# Patient Record
Sex: FEMALE | Race: WHITE | NOT HISPANIC OR LATINO | Employment: FULL TIME | URBAN - METROPOLITAN AREA
[De-identification: names, ages, dates, MRNs, and addresses within clinical notes are randomized per-mention and may not be internally consistent; named-entity substitution may affect disease eponyms.]

---

## 2017-03-18 DIAGNOSIS — E78.01 FAMILIAL HYPERCHOLESTEROLEMIA: ICD-10-CM

## 2017-03-20 RX ORDER — ROSUVASTATIN CALCIUM 40 MG/1
TABLET, COATED ORAL
Qty: 30 TABLET | Refills: 2 | Status: SHIPPED | OUTPATIENT
Start: 2017-03-20 | End: 2017-11-22 | Stop reason: SDUPTHER

## 2017-05-03 ENCOUNTER — TELEPHONE (OUTPATIENT)
Dept: URGENT CARE | Facility: CLINIC | Age: 47
End: 2017-05-03

## 2017-06-30 RX ORDER — LISINOPRIL AND HYDROCHLOROTHIAZIDE 12.5; 1 MG/1; MG/1
1 TABLET ORAL DAILY
Qty: 5 TABLET | Refills: 0 | Status: SHIPPED | OUTPATIENT
Start: 2017-06-30 | End: 2017-07-20 | Stop reason: SDUPTHER

## 2017-07-20 RX ORDER — LISINOPRIL AND HYDROCHLOROTHIAZIDE 12.5; 1 MG/1; MG/1
1 TABLET ORAL DAILY
Qty: 30 TABLET | Refills: 5 | Status: SHIPPED | OUTPATIENT
Start: 2017-07-20 | End: 2017-07-21 | Stop reason: SDUPTHER

## 2017-07-21 ENCOUNTER — TELEPHONE (OUTPATIENT)
Dept: FAMILY MEDICINE CLINIC | Facility: CLINIC | Age: 47
End: 2017-07-21

## 2017-07-21 RX ORDER — LISINOPRIL AND HYDROCHLOROTHIAZIDE 12.5; 1 MG/1; MG/1
1 TABLET ORAL DAILY
Qty: 30 TABLET | Refills: 5 | Status: SHIPPED | OUTPATIENT
Start: 2017-07-21 | End: 2017-11-01 | Stop reason: SDUPTHER

## 2017-07-21 NOTE — TELEPHONE ENCOUNTER
Spoke with patient.  Resent prescription to [facility], she no longer goes to [facility].

## 2017-07-21 NOTE — TELEPHONE ENCOUNTER
----- Message from Elias Zavaleta MD sent at 7/20/2017  5:19 PM EDT -----  Contact: PATIENT  ruben was the defualt. Let her know that is where it is.     ----- Message -----     From: Gaviota RANDA Godwin     Sent: 7/20/2017  12:28 PM       To: Elias Zavaleta MD    PATIENT SCHEDULED A TRANSFER APPOINTMENT WITH YOU ON 8/7/2017. PATIENT IS REQUESTING A REFILL ON HER LISINOPRIL UNTIL SHE CAN MAKE IT IN FOR HER APPOINTMENT. PATIENT USES [facility].

## 2017-07-27 ENCOUNTER — OFFICE VISIT (OUTPATIENT)
Dept: CARDIOLOGY | Facility: CLINIC | Age: 47
End: 2017-07-27

## 2017-07-27 VITALS
DIASTOLIC BLOOD PRESSURE: 72 MMHG | HEIGHT: 61 IN | SYSTOLIC BLOOD PRESSURE: 116 MMHG | BODY MASS INDEX: 30.17 KG/M2 | WEIGHT: 159.8 LBS | HEART RATE: 77 BPM

## 2017-07-27 DIAGNOSIS — R00.2 PALPITATIONS: Primary | ICD-10-CM

## 2017-07-27 DIAGNOSIS — E78.2 MIXED HYPERLIPIDEMIA: ICD-10-CM

## 2017-07-27 PROCEDURE — 99214 OFFICE O/P EST MOD 30 MIN: CPT | Performed by: INTERNAL MEDICINE

## 2017-07-27 NOTE — PROGRESS NOTES
[facility] at [facility]  Consultation H&P  Edyta Jocye  1970  [phone]    VISIT DATE:  2016    PCP: Bill Albrecht DO  [address] [facility] [address] [address]    IDENTIFICATION: A 47 y.o. female  from [place]. [facility].    CC:  Chief Complaint   Patient presents with   • Hyperlipidemia   • Hypertension       PROBLEM LIST:  1. Chest pain/palpitations   A. Event monitor : Breeding - predominant NSR, PAC's.   B. 2017 stress echo: mild hypertensive BP response, overall low risk stress echo.  2. Hypertension  3. Hyperlipidemia   A. 10/16: , , HDL 48,  - on statin  4. Fibromyalgia  5. Venous insufficiency  6. Epilepsy    Past Surgical History:   Procedure Laterality Date   • ANKLE SURGERY     •  SECTION     • HYSTERECTOMY     • LUMBAR LAMINECTOMY  2011    HEMILAMI RT-C7-T1, RT C7-T1 MEDIAL FACET & CT-T1 DISC (Dr. Cabrera      Allergies  Allergies   Allergen Reactions   • Diphenhydramine    • Penicillins        Current Medications    Current Outpatient Prescriptions:   •  aspirin (DIEGO ASPIRIN EC LOW DOSE) 81 MG EC tablet, Take  by mouth daily., Disp: , Rfl:   •  estrogens, conjugated, (PREMARIN) 0.625 MG tablet, Take 0.625 mg by mouth Daily. Take daily for 21 days then do not take for 7 days., Disp: , Rfl:   •  ezetimibe (ZETIA) 10 MG tablet, Take 1 tablet by mouth Daily., Disp: 30 tablet, Rfl: 5  •  lisinopril-hydrochlorothiazide (PRINZIDE,ZESTORETIC) 10-12.5 MG per tablet, Take 1 tablet by mouth Daily., Disp: 30 tablet, Rfl: 5  •  rosuvastatin (CRESTOR) 40 MG tablet, TAKE ONE TABLET BY MOUTH DAILY, Disp: 30 tablet, Rfl: 2     History of Present Illness   Hyperlipidemia   Associated symptoms include chest pain.   Hypertension   Associated symptoms include chest pain and malaise/fatigue.     Patient is a 46-year-old female with a history of hypercholesterolemia and hypertension who presents in fu for palpitations.  Pt  "w monthly occasional skipped beat that \"takes her breath\".  She travelled to [place] this summer and walked extensively without issue.    ROS  Review of Systems   Constitution: Positive for malaise/fatigue.   Cardiovascular: Positive for chest pain, dyspnea on exertion and irregular heartbeat.   All other systems reviewed and are negative.      SOCIAL HX  Social History     Social History   • Marital status: Single     Spouse name: N/A   • Number of children: N/A   • Years of education: N/A     Occupational History   • Not on file.     Social History Main Topics   • Smoking status: Former Smoker     Quit date: 7/27/2015   • Smokeless tobacco: Not on file      Comment: social smoker very rare   • Alcohol use No   • Drug use: No   • Sexual activity: Defer     Other Topics Concern   • Not on file     Social History Narrative       FAMILY HX  Family History   Problem Relation Age of Onset   • Diabetes Mother    • Congenital heart disease Mother    • Hypertension Mother    • Stroke Mother    • Diabetes Father    • Congenital heart disease Father    • Hypertension Father    • Heart attack Other    • Hyperlipidemia Other        Vitals:    07/27/17 1531   BP: 116/72   BP Location: Right arm   Patient Position: Sitting   Pulse: 77   Weight: 159 lb 12.8 oz (72.5 kg)   Height: 61\" (154.9 cm)       PHYSICAL EXAMINATION:  Physical Exam   Constitutional: She is oriented to person, place, and time. She appears well-developed and well-nourished. No distress.   HENT:   Head: Normocephalic and atraumatic.   Nose: Nose normal.   Mouth/Throat: Uvula is midline, oropharynx is clear and moist and mucous membranes are normal.   Eyes: Conjunctivae and EOM are normal. Pupils are equal, round, and reactive to light. No scleral icterus.   Neck: Normal range of motion. Neck supple. No hepatojugular reflux and no JVD present. Carotid bruit is not present. No tracheal deviation present. No thyromegaly present.   Cardiovascular: Normal rate, " regular rhythm, S1 normal, S2 normal, intact distal pulses and normal pulses.  PMI is not displaced.  Exam reveals no gallop, no distant heart sounds, no friction rub, no midsystolic click and no opening snap.    No murmur heard.  Pulses:       Radial pulses are 2+ on the right side, and 2+ on the left side.        Dorsalis pedis pulses are 2+ on the right side, and 2+ on the left side.        Posterior tibial pulses are 2+ on the right side, and 2+ on the left side.   Pulmonary/Chest: Effort normal and breath sounds normal. She has no wheezes. She has no rhonchi. She has no rales.   Abdominal: Soft. Bowel sounds are normal. She exhibits no mass. There is no tenderness. There is no guarding.   Musculoskeletal: Normal range of motion. She exhibits no edema or tenderness.   Lymphadenopathy:     She has no cervical adenopathy.   Neurological: She is alert and oriented to person, place, and time.   Skin: Skin is warm, dry and intact. No rash noted. No cyanosis or erythema. Nails show no clubbing.   Psychiatric: She has a normal mood and affect. Her behavior is normal.   Nursing note and vitals reviewed.      Diagnostic Data:  Procedures  Lab Results   Component Value Date    CHLPL 219 (H) 10/06/2016    TRIG 332 (H) 10/06/2016   HDL 48  Lab Results   Component Value Date     (H) 10/06/2016      Lab Results   Component Value Date    GLUCOSE 78 10/06/2016    BUN 10 11/11/2016    CREATININE 0.6 11/11/2016     11/11/2016    K 3.9 11/11/2016     11/11/2016    CO2 28 11/11/2016     No results found for: HGBA1C  Lab Results   Component Value Date    WBC 8.4 11/11/2016    HGB 15.0 11/11/2016    HCT 44 11/11/2016     11/11/2016       ASSESSMENT:   Diagnosis Plan   1. Palpitations     2. Mixed hyperlipidemia       PLAN:  1.  Palpitations- structurally nl heart cont observation    2. Htn controlled    3. HL on statin w high tgs discussed low cho diet    4. To get bmp, mg & tsh  levels checked w pcp  upcoming.

## 2017-08-15 ENCOUNTER — HOSPITAL ENCOUNTER (EMERGENCY)
Facility: HOSPITAL | Age: 47
Discharge: HOME | End: 2017-08-15
Attending: EMERGENCY MEDICINE | Admitting: EMERGENCY MEDICINE

## 2017-08-15 VITALS
DIASTOLIC BLOOD PRESSURE: 86 MMHG | HEIGHT: 61 IN | BODY MASS INDEX: 28.7 KG/M2 | RESPIRATION RATE: 17 BRPM | SYSTOLIC BLOOD PRESSURE: 123 MMHG | HEART RATE: 57 BPM | TEMPERATURE: 98.5 F | OXYGEN SATURATION: 96 % | WEIGHT: 152 LBS

## 2017-08-15 DIAGNOSIS — N99.820 POSTOPERATIVE VAGINAL BLEEDING FOLLOWING GENITOURINARY PROCEDURE: Primary | ICD-10-CM

## 2017-08-15 LAB
BASOPHILS # BLD AUTO: 0.06 10*3/MM3 (ref 0–0.2)
BASOPHILS NFR BLD AUTO: 0.8 % (ref 0–2.5)
DEPRECATED RDW RBC AUTO: 41.8 FL (ref 37–54)
EOSINOPHIL # BLD AUTO: 0.1 10*3/MM3 (ref 0–0.7)
EOSINOPHIL NFR BLD AUTO: 1.4 % (ref 0–7)
ERYTHROCYTE [DISTWIDTH] IN BLOOD BY AUTOMATED COUNT: 12.7 % (ref 11.5–14.5)
HCT VFR BLD AUTO: 39.4 % (ref 37–47)
HGB BLD-MCNC: 13.4 G/DL (ref 12–16)
IMM GRANULOCYTES # BLD: 0.03 10*3/MM3 (ref 0–0.06)
IMM GRANULOCYTES NFR BLD: 0.4 % (ref 0–0.6)
LYMPHOCYTES # BLD AUTO: 2.44 10*3/MM3 (ref 0.6–3.4)
LYMPHOCYTES NFR BLD AUTO: 34.5 % (ref 10–50)
MCH RBC QN AUTO: 30.8 PG (ref 27–31)
MCHC RBC AUTO-ENTMCNC: 34 G/DL (ref 30–37)
MCV RBC AUTO: 90.6 FL (ref 81–99)
MONOCYTES # BLD AUTO: 0.5 10*3/MM3 (ref 0–0.9)
MONOCYTES NFR BLD AUTO: 7.1 % (ref 0–12)
NEUTROPHILS # BLD AUTO: 3.94 10*3/MM3 (ref 2–6.9)
NEUTROPHILS NFR BLD AUTO: 55.8 % (ref 37–80)
NRBC BLD MANUAL-RTO: 0 /100 WBC (ref 0–0)
PLATELET # BLD AUTO: 222 10*3/MM3 (ref 130–400)
PMV BLD AUTO: 9.5 FL (ref 6–12)
RBC # BLD AUTO: 4.35 10*6/MM3 (ref 4.2–5.4)
WBC NRBC COR # BLD: 7.07 10*3/MM3 (ref 4.8–10.8)

## 2017-08-15 PROCEDURE — 85025 COMPLETE CBC W/AUTO DIFF WBC: CPT | Performed by: EMERGENCY MEDICINE

## 2017-08-15 PROCEDURE — 99283 EMERGENCY DEPT VISIT LOW MDM: CPT

## 2017-08-15 PROCEDURE — 99284 EMERGENCY DEPT VISIT MOD MDM: CPT

## 2017-08-15 RX ORDER — LIDOCAINE HYDROCHLORIDE AND EPINEPHRINE 10; 10 MG/ML; UG/ML
10 INJECTION, SOLUTION INFILTRATION; PERINEURAL ONCE
Status: COMPLETED | OUTPATIENT
Start: 2017-08-15 | End: 2017-08-15

## 2017-08-15 RX ORDER — OXYCODONE HYDROCHLORIDE AND ACETAMINOPHEN 5; 325 MG/1; MG/1
1 TABLET ORAL ONCE
Status: COMPLETED | OUTPATIENT
Start: 2017-08-15 | End: 2017-08-15

## 2017-08-15 RX ORDER — ONDANSETRON 4 MG/1
4 TABLET, ORALLY DISINTEGRATING ORAL ONCE
Status: COMPLETED | OUTPATIENT
Start: 2017-08-15 | End: 2017-08-15

## 2017-08-15 RX ORDER — HYDROCODONE BITARTRATE AND ACETAMINOPHEN 5; 325 MG/1; MG/1
1 TABLET ORAL EVERY 6 HOURS PRN
Qty: 10 TABLET | Refills: 0 | Status: SHIPPED | OUTPATIENT
Start: 2017-08-15 | End: 2017-08-29

## 2017-08-15 RX ADMIN — ONDANSETRON 4 MG: 4 TABLET, ORALLY DISINTEGRATING ORAL at 01:45

## 2017-08-15 RX ADMIN — OXYCODONE AND ACETAMINOPHEN 1 TABLET: 5; 325 TABLET ORAL at 02:34

## 2017-08-15 RX ADMIN — SILVER NITRATE APPLICATORS 1 APPLICATION: 25; 75 STICK TOPICAL at 02:23

## 2017-08-15 RX ADMIN — OXYCODONE AND ACETAMINOPHEN 1 TABLET: 5; 325 TABLET ORAL at 01:45

## 2017-08-15 RX ADMIN — LIDOCAINE HYDROCHLORIDE,EPINEPHRINE BITARTRATE 10 ML: 10; .01 INJECTION, SOLUTION INFILTRATION; PERINEURAL at 02:23

## 2017-08-15 NOTE — ED NOTES
Dr. Christian, on-call for Dr. Hernandez paged via [employer] for Dr. Coyne.     Tiera Cardenas  08/15/17 0133

## 2017-08-15 NOTE — ED PROVIDER NOTES
Subjective   HPI Comments: 47-year-old female presenting with vaginal bleeding.  She states that earlier today she had a left vulva punch biopsy performed.  Shortly after arriving home she began to have profuse bleeding from the biopsy site.  Her gynecologist did not use any sort of cautery in the OR.  She denies any lightheadedness or syncope.  She has no other complaints or concerns.      Review of Systems   Constitutional: Negative for chills and fever.   HENT: Negative for congestion, rhinorrhea and sore throat.    Eyes: Negative for pain.   Respiratory: Negative for cough and shortness of breath.    Cardiovascular: Negative for chest pain, palpitations and leg swelling.   Gastrointestinal: Negative for abdominal pain, diarrhea, nausea and vomiting.   Genitourinary: Positive for vaginal bleeding. Negative for dysuria.   Musculoskeletal: Negative for arthralgias.   Skin: Positive for wound. Negative for rash.   Neurological: Negative for weakness and numbness.   Psychiatric/Behavioral: Negative for behavioral problems.       Past Medical History:   Diagnosis Date   • Acute upper respiratory infection    • Bronchitis    • Cough    • Epilepsy    • Fatigue    • Fever    • Influenza    • Ovarian follicular cyst    • Sinusitis    • Sore throat        Allergies   Allergen Reactions   • Diphenhydramine    • Penicillins        Past Surgical History:   Procedure Laterality Date   • ANKLE SURGERY     •  SECTION     • HYSTERECTOMY     • LUMBAR LAMINECTOMY  2011    HEMILAMI RT-C7-T1, RT C7-T1 MEDIAL FACET & CT-T1 DISC (Dr. Cabrera       Family History   Problem Relation Age of Onset   • Diabetes Mother    • Congenital heart disease Mother    • Hypertension Mother    • Stroke Mother    • Diabetes Father    • Congenital heart disease Father    • Hypertension Father    • Heart attack Other    • Hyperlipidemia Other        Social History     Social History   • Marital status: Single     Spouse name: N/A   • Number of  children: N/A   • Years of education: N/A     Social History Main Topics   • Smoking status: Former Smoker     Quit date: 7/27/2015   • Smokeless tobacco: None      Comment: social smoker very rare   • Alcohol use No   • Drug use: No   • Sexual activity: Defer     Other Topics Concern   • None     Social History Narrative           Objective   Physical Exam   Constitutional: She is oriented to person, place, and time. She appears well-developed and well-nourished. No distress.   HENT:   Head: Normocephalic and atraumatic.   Right Ear: External ear normal.   Left Ear: External ear normal.   Nose: Nose normal.   Mouth/Throat: Oropharynx is clear and moist.   Eyes: Conjunctivae and EOM are normal. Pupils are equal, round, and reactive to light.   Neck: Normal range of motion. Neck supple.   Cardiovascular: Normal rate, regular rhythm, normal heart sounds and intact distal pulses.    Pulmonary/Chest: Effort normal and breath sounds normal. No respiratory distress.   Abdominal: Soft. Bowel sounds are normal. She exhibits no distension. There is no tenderness. There is no rebound and no guarding.   Genitourinary:   Genitourinary Comments: Left labia majora with punch biopsy site with constant ooze of bright red blood   Musculoskeletal: Normal range of motion. She exhibits no edema, tenderness or deformity.   Neurological: She is alert and oriented to person, place, and time.   Skin: Skin is warm and dry. No rash noted.   Psychiatric: She has a normal mood and affect. Her behavior is normal.   Nursing note and vitals reviewed.      Procedures         ED Course  ED Course                  MDM  Number of Diagnoses or Management Options  Postoperative vaginal bleeding following genitourinary procedure:   Diagnosis management comments: 47-year-old female with bleeding from vulvar punch biopsy site.  Well-developed, well-nourished obese female in no distress with normal vital signs and exam as above.  I discussed the case with  the on-call gynecologist who performed her biopsy, they recommended silver nitrate and/or suture as needed to stop the bleeding.  We'll give patient pain medication prior to procedure.  We'll check CBC given her description of the bleeding.  Disposition is likely home.    DDX: Post biopsy bleeding, anemia    CBC is reassuring.  Bleeding stopped after local infiltration of lidocaine and silver nitrate.  We'll discharge home with GYN follow-up.      Final diagnoses:   Postoperative vaginal bleeding following genitourinary procedure            Elias Coyne MD  08/15/17 0321

## 2017-08-21 ENCOUNTER — OFFICE VISIT (OUTPATIENT)
Dept: GYNECOLOGIC ONCOLOGY | Facility: CLINIC | Age: 47
End: 2017-08-21

## 2017-08-21 VITALS
RESPIRATION RATE: 16 BRPM | WEIGHT: 153 LBS | DIASTOLIC BLOOD PRESSURE: 73 MMHG | HEART RATE: 74 BPM | HEIGHT: 62 IN | SYSTOLIC BLOOD PRESSURE: 112 MMHG | BODY MASS INDEX: 28.16 KG/M2 | OXYGEN SATURATION: 98 % | TEMPERATURE: 97.2 F

## 2017-08-21 DIAGNOSIS — C51.9 VULVAR CANCER, CARCINOMA (HCC): Primary | ICD-10-CM

## 2017-08-21 PROCEDURE — 99205 OFFICE O/P NEW HI 60 MIN: CPT | Performed by: OBSTETRICS & GYNECOLOGY

## 2017-08-21 RX ORDER — CLOBETASOL PROPIONATE 0.5 MG/G
CREAM TOPICAL 2 TIMES WEEKLY
COMMUNITY
End: 2017-08-30 | Stop reason: HOSPADM

## 2017-08-21 NOTE — PROGRESS NOTES
Edyta Joyce  [id number]  1970      Reason for visit:  Vulvar cancer    Consultation:  Patient is being seen at the request of Dr. Abigail Fong for vulvar cancer    History of present illness:  The patient is a 47 y.o. year old female who presents today for treatment and evaluation of biopsy-proven vulvar cancer.    Patient has a history of lichen sclerosis and reports she underwent biopsy about 4 years ago as a part of her initial diagnosis.  She uses clobetasol on a regular basis.  She noted a lesion about 8 months ago on the vulva.  She noted that the lesion waxed and waned with clobetasol use.  However, the lesion became progressively painful.  Therefore, patient went to see Dr. Fong for evaluation on 8/10/2017.  It was noted that she had a lesion that was ulcerative on the left vulva.  This was thought to be 6-7 mm in size.  The lesion was very painful.  Patient returned to the office for biopsy on 8/14/2017.  On biopsy, this was an invasive squamous cell carcinoma which extended to the deep margin, at least 3.5 mm.  Patient notes pain with the biopsy and is somewhat apprehensive of a repeat biopsy today.  She was reassured that this will not occur.  She also noted that she had significant bleeding after the biopsy when she arrived at home.     Her past medical history is significant for seizures with no recent episodes.  She has had prior total abdominal hysterectomy, bilateral salpingo-oophorectomy for fibroid uterus.  She has a history of palpitations for which she sees Dr. Ken.  She underwent extensive evaluation December 2016 and most recently saw him 7/27/2017.  I reviewed his note.  I reviewed the stress test which was normal.    Her family history is remarkable for mother who underwent complete hysterectomy including bilateral salpingo-oophorectomy at the age of 23.  Patient states that she was a few weeks old when her mother underwent this surgery.  She thought it was for ovarian  cancer.  However, mother did not receive chemotherapy and  later life related to probable myocardial infarction.  There is no other family history of breast/colon/ovarian cancer.  Therefore, I think it's unlikely that mother had ovarian cancer at age 23.    OBGYN History:  She is a .  She does use HRT including oral Premarin as well as Premarin cream. She does not have a history of abnormal pap smears.      Oncologic History:   No history exists.         Past Medical History:   Diagnosis Date   • Acute upper respiratory infection    • Bronchitis    • Cough    • Epilepsy    • Fatigue    • Fever    • Influenza    • Ovarian follicular cyst    • Sinusitis    • Sore throat        Past Surgical History:   Procedure Laterality Date   • ANKLE SURGERY     •  SECTION     • HYSTERECTOMY     • LUMBAR LAMINECTOMY  2011    HEMILAMI RT-C7-T1, RT C7-T1 MEDIAL FACET & CT-T1 DISC (Dr. Cabrera       MEDICATIONS: The current medication list was reviewed with the patient and updated in the EMR this date per the Medical Assistant. Medication dosages and frequencies were confirmed to be accurate.      Allergies:  is allergic to diphenhydramine and penicillins.    Social History:   Social History     Social History   • Marital status: Single     Spouse name: N/A   • Number of children: 2   • Years of education: N/A     Occupational History   • Not on file.     Social History Main Topics   • Smoking status: Former Smoker     Quit date: 2015   • Smokeless tobacco: Not on file      Comment: social smoker very rare   • Alcohol use No   • Drug use: No   • Sexual activity: Defer     Other Topics Concern   • Not on file     Social History Narrative       Family History:    Family History   Problem Relation Age of Onset   • Diabetes Mother    • Congenital heart disease Mother    • Hypertension Mother    • Stroke Mother    • Diabetes Father    • Congenital heart disease Father    • Hypertension Father    • Heart attack  "Other    • Hyperlipidemia Other        Health Maintenance:    Health Maintenance   Topic Date Due   • TDAP/TD VACCINES (1 - Tdap) 07/06/1989   • PAP SMEAR  04/28/2016   • INFLUENZA VACCINE  09/01/2017   • LIPID PANEL  10/06/2017         Review of Systems   Constitutional: Positive for fatigue. Negative for appetite change, chills, fever and unexpected weight change.   HENT: Positive for voice change (Hoarseness). Negative for congestion, hearing loss and sore throat.    Eyes: Negative for redness and visual disturbance (Corrective lenses).   Respiratory: Negative for cough, shortness of breath and wheezing.    Cardiovascular: Positive for palpitations. Negative for chest pain and leg swelling.   Gastrointestinal: Positive for constipation and nausea. Negative for abdominal distention, abdominal pain, blood in stool, diarrhea and vomiting.   Endocrine: Negative.  Negative for cold intolerance and heat intolerance.   Genitourinary: Positive for genital sores. Negative for difficulty urinating, dyspareunia, dysuria, frequency, hematuria, pelvic pain, urgency, vaginal bleeding, vaginal discharge and vaginal pain.   Musculoskeletal: Negative for arthralgias, back pain, gait problem and joint swelling.   Skin: Negative for rash and wound.   Allergic/Immunologic: Negative for food allergies and immunocompromised state.   Neurological: Negative for dizziness, seizures, syncope, weakness, light-headedness, numbness and headaches.   Hematological: Negative for adenopathy. Does not bruise/bleed easily.   Psychiatric/Behavioral: Positive for dysphoric mood. Negative for confusion and sleep disturbance. The patient is nervous/anxious.        Physical Exam    Vitals:    08/21/17 1339   BP: 112/73   Pulse: 74   Resp: 16   Temp: 97.2 °F (36.2 °C)   TempSrc: Temporal Artery    SpO2: 98%   Weight: 153 lb (69.4 kg)   Height: 61.5\" (156.2 cm)     Body mass index is 28.44 kg/(m^2).      GENERAL: Alert, well-appearing female appearing " her stated age who is in no apparent distress. Patient is overweight according to BMI.  HEENT: Sclera anicteric. Head normocephalic, atraumatic. Mucus membranes moist.   NECK: Trachea midline, supple, without masses.  No thyromegaly.   BREASTS: Deferred  CARDIOVASCULAR: Normal rate, regular rhythm, no murmurs, rubs, or gallops.  No peripheral edema.  RESPIRATORY: Clear to auscultation bilaterally, normal respiratory effort  BACK:  No CVA tenderness, No vertebral tenderness on palpation  GASTROINTESTINAL:  Abdomen is soft, non-tender, non-distended, no rebound or guarding, no masses, or hernias. No HSM.    SKIN:  Warm, dry, well-perfused.  All visible areas intact.  No rashes, lesions, ulcers.  PSYCHIATRIC: AO x3, with appropriate affect, normal thought processes.  NEUROLOGIC: No focal deficits.  Moves extremities well.  MUSCULOSKELETAL: Normal gait and station.   EXTREMITIES:   No cyanosis, clubbing, symmetric.  LYMPHATICS:  No cervical or inguinal adenopathy noted.     PELVIC exam:    GYNECOLOGIC:  External genitalia are remarkable for diffuse thickened white epithelium consistent with ARG[place] versus lichen sclerosis.  At the left medial vulva, there is a 1.5 cm ulcerative area consistent with vulvar cancer.  There is no active bleeding or drainage.  The area was tender to palpation.  The lesion was superficial and mobile.. On speculum examination, the vaginal cuff was intact and no lesions were appreciated.  On bimanual examination, no fullness was appreciated.  Uterus, cervix and adnexa were absent.  There was no significant tenderness.  Rectovaginal exam was deferred.    ECOG PS 0    PROCEDURES:  none    Diagnostic Data:    No results found for: ]      Assessment/Plan   This is a 47 y.o. 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  This is a stage 1B (T1b due to depth of invasion, N0, M0) vulvar cancer.  Extensive surrounding lichen sclerosis versus ARGENTINA which extends to the bilateral  vulva.    Given the size and appearance of this cancer as well as the biopsy information, I think the patient is a good candidate for sentinel lymph node.  She understands the basics of the procedure.  She understands that Dr. Bree Russell will be seeing her in consultation in order to facilitate sentinel lymph node dissection for this early vulvar cancer.  Fortunately, Dr. Russell is able to see the patient tomorrow in the office.      I discussed that it is unclear at this point if patient would need further treatment, but the clinical examination is very encouraging.  We discussed the rationale for sentinel lymph node with decreased risk of long-term lymphedema as well as decrease risk of damage to nerves and vascular structures at the time of inguinofemoral lymph node dissection.    Patient was consented for left modified radical vulvectomy, bilateral inguinofemoral sentinel lymph node evaluation, mapping biopsies of vulva.    Risks and benefits of surgery were discussed.  This included, but was not limited to, infection and bleeding like when the skin is cut; damage to surrounding structures; and incisional complications.  Risk of DVT was addressed for major surgeries.  Standard of care efforts to minimize these risks were reviewed.  Typical hospital stay and recovery were discussed as well as post-procedure precautions.  Surgical implications of chronic illnesses on recovery and surgical outcome were reviewed.     Due to patient's history of palpitations and extensive prior cardiac workup, perioperative risk assessment will need to be performed prior to surgery.  I'll leave it to 's discretion if patient needs to return to the office to see him again.  Possibility of same day discharge was discussed with the patient and her .    Patient verbalized understanding of the plan including the risks and benefits.  Appropriate perioperative testing including laboratory evaluation, EKG as clinically  indicated, chest x-ray as clinically indicated, and preadmission evaluation were all ordered as a part of this patient's care.    FOLLOW UP: Return for surgery.    Note: Speech recognition transcription software was used to dictate portions of this document.  An attempt at proofreading has been made though minor errors in transcription may still be present.  Please do not hesitate to call our office with any questions.    Electronically Signed by: Isabela Nassar MD  Date: 8/21/2017

## 2017-08-22 ENCOUNTER — TELEPHONE (OUTPATIENT)
Dept: GYNECOLOGIC ONCOLOGY | Facility: CLINIC | Age: 47
End: 2017-08-22

## 2017-08-22 ENCOUNTER — TELEPHONE (OUTPATIENT)
Dept: CARDIOLOGY | Facility: CLINIC | Age: 47
End: 2017-08-22

## 2017-08-22 NOTE — TELEPHONE ENCOUNTER
Phoned Dr. Ken's office, left message for Tye to call regarding pt needing an appointment for clearance for surgery.

## 2017-08-24 ENCOUNTER — PREP FOR SURGERY (OUTPATIENT)
Dept: GYNECOLOGIC ONCOLOGY | Facility: CLINIC | Age: 47
End: 2017-08-24

## 2017-08-24 ENCOUNTER — TELEPHONE (OUTPATIENT)
Dept: GYNECOLOGIC ONCOLOGY | Facility: CLINIC | Age: 47
End: 2017-08-24

## 2017-08-24 DIAGNOSIS — C51.9 VULVAR CANCER (HCC): Primary | ICD-10-CM

## 2017-08-24 RX ORDER — SODIUM CHLORIDE 0.9 % (FLUSH) 0.9 %
1-10 SYRINGE (ML) INJECTION AS NEEDED
Status: CANCELLED | OUTPATIENT
Start: 2017-08-24

## 2017-08-25 ENCOUNTER — PREP FOR SURGERY (OUTPATIENT)
Dept: GYNECOLOGIC ONCOLOGY | Facility: CLINIC | Age: 47
End: 2017-08-25

## 2017-08-25 PROBLEM — C51.9 VULVAR CANCER (HCC): Status: ACTIVE | Noted: 2017-08-25

## 2017-08-25 NOTE — PATIENT INSTRUCTIONS
Gynecologic Oncology  Inpatient Pre-op Patient Education  *See checked boxes for your instructions*    Patient Name:  Edyta Joyce  [id number]  1970    Surgeon:  Dr. Nassar    Appointment  [x]  1. Your surgery has been scheduled on 8-30-17. You will need to be at the second floor surgery registration of the main hospital on that day at 6:00 AM.   [] 2.  You have a pre-admission testing (PAT) appointment for labs and possibly chest xray and EKG, on  at .  You will need to be at hospital registration on the first floor, 10 minutes before your arrival time.   [] 3.  The hospital registration department is located in the long hallway between the [address] buildings.     The Day(s) Before Surgery  [x] 1. On 8-29-17, the day prior to surgery, eat lightly.  No solid food after midnight on 8-29-17, including NO MILK, CREAM, OR ORANGE JUICE.  You may have sips of clear fluids up until two-three hours prior to your arrival to the hospital on the morning of surgery.     [] 2.  You may need to use a stool softener, the day prior to surgery to help with existing constipation and to clean out your bowels.  You can purchase Miralax over-the-counter at the pharmacy and follow the directions on the back.  (Do not do this step unless the box is checked).   [x] 3.  Do not take vitamins or full dose aspirin one week before surgery.  If you normally take a blood thinning medication such as Warfarin, Eliquis, or Xarelto, we will give you specific instructions regarding these medications and we may need to talk with your other doctors.   [x] 4.  On the morning of your surgery, you may likely take your routine prescription medications with a sip of water as reviewed with you by your surgeon.  Bring your home medications with you to the hospital as we may need to reference these.  In particular be sure to bring any inhalers.     Post-surgery Instructions  [x] 1.  The length of stay for your type of surgery is typically one  hospital night, however it is also possible that you could be discharged home in the evening on the same day depending on the nature of your surgery.  All rooms are private, so family member may stay with you.     [x] 2.  Do not take your own home prescription medication while you are in the hospital unless otherwise instructed.  These will be provided to you.         Comments:  '

## 2017-08-28 ENCOUNTER — TELEPHONE (OUTPATIENT)
Dept: GYNECOLOGIC ONCOLOGY | Facility: CLINIC | Age: 47
End: 2017-08-28

## 2017-08-28 DIAGNOSIS — C51.9 VULVAR CANCER, CARCINOMA (HCC): Primary | ICD-10-CM

## 2017-08-28 RX ORDER — LIDOCAINE HCL 4% 4 G/100G
1 CREAM TOPICAL 3 TIMES DAILY PRN
Qty: 1 TUBE | Refills: 1 | Status: SHIPPED | OUTPATIENT
Start: 2017-08-28 | End: 2017-08-29

## 2017-08-28 NOTE — TELEPHONE ENCOUNTER
Discussed ERX for lidocaine.  Discussed that while surgery can be disfiguring, she has a small lesion.  We discussed that the goals of sentinel lymph node are to prevent long-term sequela related to inguinofemoral lymph node dissection sent as lymphedema.  We discussed incisional complications that could occur with her cancer surgery such as seroma and hematoma.  Patient verbalized understanding.  All questions were answered.  She had questions about PAT.  She is just undergoing laboratory evaluation the day of surgery as she is young and low risk.

## 2017-08-29 ENCOUNTER — APPOINTMENT (OUTPATIENT)
Dept: PREADMISSION TESTING | Facility: HOSPITAL | Age: 47
End: 2017-08-29

## 2017-08-29 ENCOUNTER — HOSPITAL ENCOUNTER (OUTPATIENT)
Dept: GENERAL RADIOLOGY | Facility: HOSPITAL | Age: 47
Discharge: HOME | End: 2017-08-29
Admitting: OBSTETRICS & GYNECOLOGY

## 2017-08-29 ENCOUNTER — ANESTHESIA EVENT (OUTPATIENT)
Dept: PERIOP | Facility: HOSPITAL | Age: 47
End: 2017-08-29

## 2017-08-29 VITALS — WEIGHT: 158.07 LBS | BODY MASS INDEX: 29.84 KG/M2 | HEIGHT: 61 IN

## 2017-08-29 DIAGNOSIS — C51.9 VULVAR CANCER (HCC): ICD-10-CM

## 2017-08-29 LAB
ALBUMIN SERPL-MCNC: 4 G/DL (ref 3.2–4.8)
ALBUMIN/GLOB SERPL: 1.7 G/DL (ref 1.5–2.5)
ALP SERPL-CCNC: 45 U/L (ref 25–100)
ALT SERPL W P-5'-P-CCNC: 34 U/L (ref 7–40)
ANION GAP SERPL CALCULATED.3IONS-SCNC: 5 MMOL/L (ref 3–11)
AST SERPL-CCNC: 25 U/L (ref 0–33)
BASOPHILS # BLD AUTO: 0.03 10*3/MM3 (ref 0–0.2)
BASOPHILS NFR BLD AUTO: 0.4 % (ref 0–1)
BILIRUB SERPL-MCNC: 0.3 MG/DL (ref 0.3–1.2)
BUN BLD-MCNC: 12 MG/DL (ref 9–23)
BUN/CREAT SERPL: 17.1 (ref 7–25)
CALCIUM SPEC-SCNC: 9 MG/DL (ref 8.7–10.4)
CHLORIDE SERPL-SCNC: 104 MMOL/L (ref 99–109)
CO2 SERPL-SCNC: 29 MMOL/L (ref 20–31)
CREAT BLD-MCNC: 0.7 MG/DL (ref 0.6–1.3)
DEPRECATED RDW RBC AUTO: 43.1 FL (ref 37–54)
EOSINOPHIL # BLD AUTO: 0.06 10*3/MM3 (ref 0–0.3)
EOSINOPHIL NFR BLD AUTO: 0.8 % (ref 0–3)
ERYTHROCYTE [DISTWIDTH] IN BLOOD BY AUTOMATED COUNT: 12.9 % (ref 11.3–14.5)
GFR SERPL CREATININE-BSD FRML MDRD: 90 ML/MIN/1.73
GLOBULIN UR ELPH-MCNC: 2.4 GM/DL
GLUCOSE BLD-MCNC: 101 MG/DL (ref 70–100)
HCT VFR BLD AUTO: 38.5 % (ref 34.5–44)
HGB BLD-MCNC: 12.7 G/DL (ref 11.5–15.5)
IMM GRANULOCYTES # BLD: 0.01 10*3/MM3 (ref 0–0.03)
IMM GRANULOCYTES NFR BLD: 0.1 % (ref 0–0.6)
LYMPHOCYTES # BLD AUTO: 1.82 10*3/MM3 (ref 0.6–4.8)
LYMPHOCYTES NFR BLD AUTO: 25.5 % (ref 24–44)
MCH RBC QN AUTO: 30.4 PG (ref 27–31)
MCHC RBC AUTO-ENTMCNC: 33 G/DL (ref 32–36)
MCV RBC AUTO: 92.1 FL (ref 80–99)
MONOCYTES # BLD AUTO: 0.43 10*3/MM3 (ref 0–1)
MONOCYTES NFR BLD AUTO: 6 % (ref 0–12)
NEUTROPHILS # BLD AUTO: 4.8 10*3/MM3 (ref 1.5–8.3)
NEUTROPHILS NFR BLD AUTO: 67.2 % (ref 41–71)
PLATELET # BLD AUTO: 218 10*3/MM3 (ref 150–450)
PMV BLD AUTO: 9.8 FL (ref 6–12)
POTASSIUM BLD-SCNC: 4 MMOL/L (ref 3.5–5.5)
PROT SERPL-MCNC: 6.4 G/DL (ref 5.7–8.2)
RBC # BLD AUTO: 4.18 10*6/MM3 (ref 3.89–5.14)
SODIUM BLD-SCNC: 138 MMOL/L (ref 132–146)
WBC NRBC COR # BLD: 7.15 10*3/MM3 (ref 3.5–10.8)

## 2017-08-29 PROCEDURE — 93010 ELECTROCARDIOGRAM REPORT: CPT | Performed by: INTERNAL MEDICINE

## 2017-08-29 RX ORDER — HYDROMORPHONE HYDROCHLORIDE 1 MG/ML
0.5 INJECTION, SOLUTION INTRAMUSCULAR; INTRAVENOUS; SUBCUTANEOUS
Status: CANCELLED | OUTPATIENT
Start: 2017-08-29

## 2017-08-29 RX ORDER — FENTANYL CITRATE 50 UG/ML
50 INJECTION, SOLUTION INTRAMUSCULAR; INTRAVENOUS
Status: CANCELLED | OUTPATIENT
Start: 2017-08-29

## 2017-08-29 RX ORDER — FAMOTIDINE 10 MG/ML
20 INJECTION, SOLUTION INTRAVENOUS ONCE
Status: CANCELLED | OUTPATIENT
Start: 2017-08-29 | End: 2017-08-29

## 2017-08-30 ENCOUNTER — ANESTHESIA (OUTPATIENT)
Dept: PERIOP | Facility: HOSPITAL | Age: 47
End: 2017-08-30

## 2017-08-30 ENCOUNTER — HOSPITAL ENCOUNTER (OUTPATIENT)
Dept: NUCLEAR MEDICINE | Facility: HOSPITAL | Age: 47
Discharge: HOME | End: 2017-08-30

## 2017-08-30 ENCOUNTER — HOSPITAL ENCOUNTER (OUTPATIENT)
Facility: HOSPITAL | Age: 47
Setting detail: SURGERY ADMIT
Discharge: HOME | End: 2017-08-30
Attending: OBSTETRICS & GYNECOLOGY | Admitting: OBSTETRICS & GYNECOLOGY

## 2017-08-30 VITALS
SYSTOLIC BLOOD PRESSURE: 112 MMHG | OXYGEN SATURATION: 98 % | HEART RATE: 67 BPM | RESPIRATION RATE: 16 BRPM | DIASTOLIC BLOOD PRESSURE: 69 MMHG | TEMPERATURE: 97.5 F

## 2017-08-30 DIAGNOSIS — C51.9 VULVAR CANCER (HCC): ICD-10-CM

## 2017-08-30 DIAGNOSIS — C51.9 VULVAR CANCER, CARCINOMA (HCC): ICD-10-CM

## 2017-08-30 PROCEDURE — 88305 TISSUE EXAM BY PATHOLOGIST: CPT | Performed by: OBSTETRICS & GYNECOLOGY

## 2017-08-30 PROCEDURE — 88331 PATH CONSLTJ SURG 1 BLK 1SPC: CPT | Performed by: OBSTETRICS & GYNECOLOGY

## 2017-08-30 PROCEDURE — 88307 TISSUE EXAM BY PATHOLOGIST: CPT | Performed by: OBSTETRICS & GYNECOLOGY

## 2017-08-30 PROCEDURE — 25010000002 MIDAZOLAM PER 1 MG: Performed by: ANESTHESIOLOGY

## 2017-08-30 PROCEDURE — 25010000002 DEXAMETHASONE PER 1 MG: Performed by: NURSE ANESTHETIST, CERTIFIED REGISTERED

## 2017-08-30 PROCEDURE — 25010000002 NEOSTIGMINE PER 0.5 MG: Performed by: NURSE ANESTHETIST, CERTIFIED REGISTERED

## 2017-08-30 PROCEDURE — 25010000002 HYDROMORPHONE PER 4 MG: Performed by: NURSE ANESTHETIST, CERTIFIED REGISTERED

## 2017-08-30 PROCEDURE — 25010000002 PROMETHAZINE PER 50 MG: Performed by: NURSE ANESTHETIST, CERTIFIED REGISTERED

## 2017-08-30 PROCEDURE — 0 TECHNETIUM FILTERED SULFUR COLLOID: Performed by: SURGERY

## 2017-08-30 PROCEDURE — 88309 TISSUE EXAM BY PATHOLOGIST: CPT | Performed by: OBSTETRICS & GYNECOLOGY

## 2017-08-30 PROCEDURE — 88332 PATH CONSLTJ SURG EA ADD BLK: CPT | Performed by: OBSTETRICS & GYNECOLOGY

## 2017-08-30 PROCEDURE — 38792 RA TRACER ID OF SENTINL NODE: CPT

## 2017-08-30 PROCEDURE — 25010000002 FENTANYL CITRATE (PF) 100 MCG/2ML SOLUTION: Performed by: NURSE ANESTHETIST, CERTIFIED REGISTERED

## 2017-08-30 PROCEDURE — 25010000002 ONDANSETRON PER 1 MG: Performed by: NURSE ANESTHETIST, CERTIFIED REGISTERED

## 2017-08-30 PROCEDURE — A9541 TC99M SULFUR COLLOID: HCPCS | Performed by: SURGERY

## 2017-08-30 PROCEDURE — 25010000002 PROPOFOL 10 MG/ML EMULSION: Performed by: NURSE ANESTHETIST, CERTIFIED REGISTERED

## 2017-08-30 PROCEDURE — 56632 VLVCTMY RAD PRTL BI LYMPHAD: CPT | Performed by: OBSTETRICS & GYNECOLOGY

## 2017-08-30 RX ORDER — MIDAZOLAM HYDROCHLORIDE 5 MG/ML
2 INJECTION INTRAMUSCULAR; INTRAVENOUS ONCE
Status: COMPLETED | OUTPATIENT
Start: 2017-08-30 | End: 2017-08-30

## 2017-08-30 RX ORDER — MEPERIDINE HYDROCHLORIDE 25 MG/ML
12.5 INJECTION INTRAMUSCULAR; INTRAVENOUS; SUBCUTANEOUS
Status: DISCONTINUED | OUTPATIENT
Start: 2017-08-30 | End: 2017-08-30 | Stop reason: HOSPADM

## 2017-08-30 RX ORDER — SODIUM CHLORIDE, SODIUM LACTATE, POTASSIUM CHLORIDE, CALCIUM CHLORIDE 600; 310; 30; 20 MG/100ML; MG/100ML; MG/100ML; MG/100ML
9 INJECTION, SOLUTION INTRAVENOUS CONTINUOUS
Status: DISCONTINUED | OUTPATIENT
Start: 2017-08-30 | End: 2017-08-30 | Stop reason: HOSPADM

## 2017-08-30 RX ORDER — FENTANYL CITRATE 50 UG/ML
50 INJECTION, SOLUTION INTRAMUSCULAR; INTRAVENOUS
Status: DISCONTINUED | OUTPATIENT
Start: 2017-08-30 | End: 2017-08-30 | Stop reason: HOSPADM

## 2017-08-30 RX ORDER — MAGNESIUM HYDROXIDE 1200 MG/15ML
LIQUID ORAL AS NEEDED
Status: DISCONTINUED | OUTPATIENT
Start: 2017-08-30 | End: 2017-08-30 | Stop reason: HOSPADM

## 2017-08-30 RX ORDER — POLYETHYLENE GLYCOL 3350 17 G/17G
17 POWDER, FOR SOLUTION ORAL DAILY
Qty: 30 EACH | Refills: 0 | Status: SHIPPED | OUTPATIENT
Start: 2017-08-30 | End: 2017-10-17

## 2017-08-30 RX ORDER — ATRACURIUM BESYLATE 10 MG/ML
INJECTION, SOLUTION INTRAVENOUS AS NEEDED
Status: DISCONTINUED | OUTPATIENT
Start: 2017-08-30 | End: 2017-08-30 | Stop reason: SURG

## 2017-08-30 RX ORDER — HYDROMORPHONE HYDROCHLORIDE 1 MG/ML
0.5 INJECTION, SOLUTION INTRAMUSCULAR; INTRAVENOUS; SUBCUTANEOUS
Status: DISCONTINUED | OUTPATIENT
Start: 2017-08-30 | End: 2017-08-30 | Stop reason: HOSPADM

## 2017-08-30 RX ORDER — LIDOCAINE HYDROCHLORIDE 10 MG/ML
INJECTION, SOLUTION INFILTRATION; PERINEURAL AS NEEDED
Status: DISCONTINUED | OUTPATIENT
Start: 2017-08-30 | End: 2017-08-30 | Stop reason: SURG

## 2017-08-30 RX ORDER — SULFAMETHOXAZOLE AND TRIMETHOPRIM 800; 160 MG/1; MG/1
1 TABLET ORAL 2 TIMES DAILY
Qty: 14 TABLET | Refills: 0 | Status: SHIPPED | OUTPATIENT
Start: 2017-08-30 | End: 2017-09-06

## 2017-08-30 RX ORDER — FAMOTIDINE 20 MG/1
20 TABLET, FILM COATED ORAL ONCE
Status: COMPLETED | OUTPATIENT
Start: 2017-08-30 | End: 2017-08-30

## 2017-08-30 RX ORDER — LIDOCAINE HYDROCHLORIDE 10 MG/ML
0.5 INJECTION, SOLUTION EPIDURAL; INFILTRATION; INTRACAUDAL; PERINEURAL ONCE AS NEEDED
Status: COMPLETED | OUTPATIENT
Start: 2017-08-30 | End: 2017-08-30

## 2017-08-30 RX ORDER — PROPOFOL 10 MG/ML
VIAL (ML) INTRAVENOUS AS NEEDED
Status: DISCONTINUED | OUTPATIENT
Start: 2017-08-30 | End: 2017-08-30 | Stop reason: SURG

## 2017-08-30 RX ORDER — SODIUM CHLORIDE 0.9 % (FLUSH) 0.9 %
1-10 SYRINGE (ML) INJECTION AS NEEDED
Status: DISCONTINUED | OUTPATIENT
Start: 2017-08-30 | End: 2017-08-30 | Stop reason: HOSPADM

## 2017-08-30 RX ORDER — ONDANSETRON 2 MG/ML
INJECTION INTRAMUSCULAR; INTRAVENOUS AS NEEDED
Status: DISCONTINUED | OUTPATIENT
Start: 2017-08-30 | End: 2017-08-30 | Stop reason: SURG

## 2017-08-30 RX ORDER — PROMETHAZINE HYDROCHLORIDE 25 MG/1
25 SUPPOSITORY RECTAL ONCE AS NEEDED
Status: COMPLETED | OUTPATIENT
Start: 2017-08-30 | End: 2017-08-30

## 2017-08-30 RX ORDER — ISOSULFAN BLUE 50 MG/5ML
INJECTION, SOLUTION SUBCUTANEOUS AS NEEDED
Status: DISCONTINUED | OUTPATIENT
Start: 2017-08-30 | End: 2017-08-30 | Stop reason: HOSPADM

## 2017-08-30 RX ORDER — CLINDAMYCIN PHOSPHATE 900 MG/50ML
900 INJECTION, SOLUTION INTRAVENOUS ONCE
Status: COMPLETED | OUTPATIENT
Start: 2017-08-30 | End: 2017-08-30

## 2017-08-30 RX ORDER — GLYCOPYRROLATE 0.2 MG/ML
INJECTION INTRAMUSCULAR; INTRAVENOUS AS NEEDED
Status: DISCONTINUED | OUTPATIENT
Start: 2017-08-30 | End: 2017-08-30 | Stop reason: SURG

## 2017-08-30 RX ORDER — DEXAMETHASONE SODIUM PHOSPHATE 4 MG/ML
INJECTION, SOLUTION INTRA-ARTICULAR; INTRALESIONAL; INTRAMUSCULAR; INTRAVENOUS; SOFT TISSUE AS NEEDED
Status: DISCONTINUED | OUTPATIENT
Start: 2017-08-30 | End: 2017-08-30 | Stop reason: SURG

## 2017-08-30 RX ORDER — FENTANYL CITRATE 50 UG/ML
INJECTION, SOLUTION INTRAMUSCULAR; INTRAVENOUS AS NEEDED
Status: DISCONTINUED | OUTPATIENT
Start: 2017-08-30 | End: 2017-08-30 | Stop reason: SURG

## 2017-08-30 RX ORDER — PROMETHAZINE HYDROCHLORIDE 25 MG/ML
6.25 INJECTION, SOLUTION INTRAMUSCULAR; INTRAVENOUS ONCE AS NEEDED
Status: COMPLETED | OUTPATIENT
Start: 2017-08-30 | End: 2017-08-30

## 2017-08-30 RX ORDER — OXYCODONE HYDROCHLORIDE 5 MG/1
5 TABLET ORAL EVERY 4 HOURS PRN
Qty: 30 TABLET | Refills: 0 | Status: SHIPPED | OUTPATIENT
Start: 2017-08-30 | End: 2017-09-07

## 2017-08-30 RX ORDER — DOCUSATE SODIUM 250 MG
250 CAPSULE ORAL 2 TIMES DAILY
Qty: 60 CAPSULE | Refills: 1 | Status: SHIPPED | OUTPATIENT
Start: 2017-08-30 | End: 2017-10-17

## 2017-08-30 RX ORDER — PROMETHAZINE HYDROCHLORIDE 25 MG/1
25 TABLET ORAL ONCE AS NEEDED
Status: COMPLETED | OUTPATIENT
Start: 2017-08-30 | End: 2017-08-30

## 2017-08-30 RX ORDER — BUPIVACAINE HYDROCHLORIDE AND EPINEPHRINE 2.5; 5 MG/ML; UG/ML
INJECTION, SOLUTION INFILTRATION; PERINEURAL AS NEEDED
Status: DISCONTINUED | OUTPATIENT
Start: 2017-08-30 | End: 2017-08-30 | Stop reason: HOSPADM

## 2017-08-30 RX ORDER — ONDANSETRON 8 MG/1
8 TABLET, ORALLY DISINTEGRATING ORAL EVERY 8 HOURS PRN
Qty: 10 TABLET | Refills: 0 | Status: SHIPPED | OUTPATIENT
Start: 2017-08-30 | End: 2017-10-02

## 2017-08-30 RX ADMIN — Medication 4 MG: at 10:04

## 2017-08-30 RX ADMIN — LIDOCAINE HYDROCHLORIDE 50 MG: 10 INJECTION, SOLUTION INFILTRATION; PERINEURAL at 08:09

## 2017-08-30 RX ADMIN — SODIUM CHLORIDE, POTASSIUM CHLORIDE, SODIUM LACTATE AND CALCIUM CHLORIDE: 600; 310; 30; 20 INJECTION, SOLUTION INTRAVENOUS at 10:05

## 2017-08-30 RX ADMIN — CLINDAMYCIN PHOSPHATE 900 MG: 900 INJECTION, SOLUTION INTRAVENOUS at 08:15

## 2017-08-30 RX ADMIN — ONDANSETRON 4 MG: 2 INJECTION INTRAMUSCULAR; INTRAVENOUS at 08:56

## 2017-08-30 RX ADMIN — FENTANYL CITRATE 100 MCG: 50 INJECTION, SOLUTION INTRAMUSCULAR; INTRAVENOUS at 08:09

## 2017-08-30 RX ADMIN — FENTANYL CITRATE 50 MCG: 50 INJECTION, SOLUTION INTRAMUSCULAR; INTRAVENOUS at 09:00

## 2017-08-30 RX ADMIN — MIDAZOLAM HYDROCHLORIDE 2 MG: 5 INJECTION, SOLUTION INTRAMUSCULAR; INTRAVENOUS at 07:40

## 2017-08-30 RX ADMIN — FENTANYL CITRATE 50 MCG: 50 INJECTION, SOLUTION INTRAMUSCULAR; INTRAVENOUS at 09:15

## 2017-08-30 RX ADMIN — SODIUM CHLORIDE, POTASSIUM CHLORIDE, SODIUM LACTATE AND CALCIUM CHLORIDE 9 ML/HR: 600; 310; 30; 20 INJECTION, SOLUTION INTRAVENOUS at 06:45

## 2017-08-30 RX ADMIN — PROPOFOL 150 MG: 10 INJECTION, EMULSION INTRAVENOUS at 08:09

## 2017-08-30 RX ADMIN — HYDROMORPHONE HYDROCHLORIDE 0.5 MG: 1 INJECTION, SOLUTION INTRAMUSCULAR; INTRAVENOUS; SUBCUTANEOUS at 10:59

## 2017-08-30 RX ADMIN — FAMOTIDINE 20 MG: 20 TABLET, FILM COATED ORAL at 06:50

## 2017-08-30 RX ADMIN — ATRACURIUM BESYLATE 40 MG: 10 INJECTION, SOLUTION INTRAVENOUS at 08:09

## 2017-08-30 RX ADMIN — LIDOCAINE HYDROCHLORIDE 0.5 ML: 10 INJECTION, SOLUTION EPIDURAL; INFILTRATION; INTRACAUDAL; PERINEURAL at 06:45

## 2017-08-30 RX ADMIN — TECHNETIUM TC 99M SULFUR COLLOID 1 DOSE: KIT at 08:10

## 2017-08-30 RX ADMIN — PROMETHAZINE HYDROCHLORIDE 6.25 MG: 25 INJECTION INTRAMUSCULAR; INTRAVENOUS at 10:45

## 2017-08-30 RX ADMIN — GLYCOPYRROLATE 0.4 MG: 0.2 INJECTION, SOLUTION INTRAMUSCULAR; INTRAVENOUS at 10:04

## 2017-08-30 RX ADMIN — DEXAMETHASONE SODIUM PHOSPHATE 8 MG: 4 INJECTION, SOLUTION INTRAMUSCULAR; INTRAVENOUS at 08:09

## 2017-08-30 RX ADMIN — SODIUM CHLORIDE, POTASSIUM CHLORIDE, SODIUM LACTATE AND CALCIUM CHLORIDE: 600; 310; 30; 20 INJECTION, SOLUTION INTRAVENOUS at 08:09

## 2017-08-31 ENCOUNTER — TELEPHONE (OUTPATIENT)
Dept: GYNECOLOGIC ONCOLOGY | Facility: CLINIC | Age: 47
End: 2017-08-31

## 2017-09-05 ENCOUNTER — TELEPHONE (OUTPATIENT)
Dept: GYNECOLOGIC ONCOLOGY | Facility: CLINIC | Age: 47
End: 2017-09-05

## 2017-09-05 LAB
CYTO UR: NORMAL
LAB AP CASE REPORT: NORMAL
LAB AP CLINICAL INFORMATION: NORMAL
Lab: NORMAL
Lab: NORMAL
PATH REPORT.FINAL DX SPEC: NORMAL
PATH REPORT.GROSS SPEC: NORMAL

## 2017-09-05 NOTE — TELEPHONE ENCOUNTER
Pt phoned office with c/o small amount of reddish yellow tinged drainage from her right groin incision, her bulb only has about 20 cc's for the past 2 days, and some right hip/side discomfort when up moving.  Instructed her to monitor the drainage, call if increases, or fever.  Told her to monitor the discomfort to her hip area, call if gets worse, o/w, has an appt Thursday for f/u with Dr. Nassar. Pt v/u, will call if needed.

## 2017-09-06 ENCOUNTER — TELEPHONE (OUTPATIENT)
Dept: GYNECOLOGIC ONCOLOGY | Facility: CLINIC | Age: 47
End: 2017-09-06

## 2017-09-06 NOTE — TELEPHONE ENCOUNTER
Patient called with more discomfort around incision site. With a low grade fever of 99.1. Tarsha advised and patient to take ibuprofen, clean the area well and apply neosporin around the edges. She has an appointment to be seen tomorrow and we don't have anyone to see her today. She is to call the medical exchange should she need immediate assistance after hours.

## 2017-09-07 ENCOUNTER — OFFICE VISIT (OUTPATIENT)
Dept: GYNECOLOGIC ONCOLOGY | Facility: CLINIC | Age: 47
End: 2017-09-07

## 2017-09-07 VITALS
WEIGHT: 156 LBS | HEART RATE: 73 BPM | SYSTOLIC BLOOD PRESSURE: 120 MMHG | DIASTOLIC BLOOD PRESSURE: 71 MMHG | BODY MASS INDEX: 29.48 KG/M2 | RESPIRATION RATE: 16 BRPM | TEMPERATURE: 97.9 F | OXYGEN SATURATION: 97 %

## 2017-09-07 DIAGNOSIS — C51.9 VULVAR CANCER, CARCINOMA (HCC): Primary | ICD-10-CM

## 2017-09-07 DIAGNOSIS — C51.9 VULVAR CANCER (HCC): ICD-10-CM

## 2017-09-07 PROCEDURE — 99024 POSTOP FOLLOW-UP VISIT: CPT | Performed by: OBSTETRICS & GYNECOLOGY

## 2017-09-07 NOTE — TELEPHONE ENCOUNTER
I tried to call patient about path - left VM that LN were negative and apologized for not being available tomorrow due to family emergency.  It looks like the drain can come out.

## 2017-09-07 NOTE — PROGRESS NOTES
Progress note; postoperative visit:    Problems:  1.  7 days following bilateral inguinal sentinel node biopsy, left radical vulvar resection  2.  Section drain right groin.  Drainage has decreased to minimal  3.  Expected postoperative discomfort  4.  Questions regarding work  5.  Symptoms of itching consistent with preoperative diagnosis of lichen sclerosis.  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  7 days ago she underwent bilateral sentinel lymph node sampling and a wide deep radical excision of the lesion.  A Talib-Fay drain was left in the right groin.  This has now lost no drainage for the last 2-3 days.  She likewise her family caring for her incision lines.  Her discomfort is acceptable.    Examination:    Examination of the groins revealed minimal ecchymosis.  The incision lines are soft and clean and dry.  The excision site is intact the sutures are in place.  There is no surrounding induration.  There is no separation.  There is no excessive drainage.    Impression:  Satisfactory postoperative recovery at 7 days    Plan:  1.  We discussed returning back to work.  I suggested view of her operative procedure in a fifth grade schoolteacher that it would be somewhere between 4 and 6 weeks.  2.  The NEETU drain in the right groin was removed without difficulty and sterile dressing applied.  3.  Patient inquired regarding the symptoms of itching.  We recommended not using the clobetasol and the patient that some of the itching may be due to the operative procedure itself.  4.  A copy of the pathology report was given to the patient explaining the findings and impression.  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  Patient is scheduled for a two-week follow-up with Dr. Nassar.  If healing progresses more rapidly she possibly could return to work sooner.

## 2017-09-15 ENCOUNTER — TELEPHONE (OUTPATIENT)
Dept: GYNECOLOGIC ONCOLOGY | Facility: CLINIC | Age: 47
End: 2017-09-15

## 2017-09-15 NOTE — TELEPHONE ENCOUNTER
----- Message from Barbie Cline sent at 9/15/2017  8:08 AM EDT -----  [facility]  Contact: [phone]  Patient wants to know if she can continue to take premarin. She states she called for refill and Dr Fong is concerned that she may not need to continue. Please advise.      I discussed with pt.  She is not wanting a refill on her premarin cream, as she is aware she can not use the cream until vulvar area healed from surgery.  She requested RF on oral estradiol.  RF sent to pt's pharmacy.

## 2017-09-19 ENCOUNTER — OFFICE VISIT (OUTPATIENT)
Dept: GYNECOLOGIC ONCOLOGY | Facility: CLINIC | Age: 47
End: 2017-09-19

## 2017-09-19 VITALS
RESPIRATION RATE: 14 BRPM | HEART RATE: 57 BPM | OXYGEN SATURATION: 96 % | BODY MASS INDEX: 30.04 KG/M2 | WEIGHT: 159 LBS | TEMPERATURE: 97.7 F | DIASTOLIC BLOOD PRESSURE: 84 MMHG | SYSTOLIC BLOOD PRESSURE: 132 MMHG

## 2017-09-19 DIAGNOSIS — L90.0 LICHEN SCLEROSUS ET ATROPHICUS: ICD-10-CM

## 2017-09-19 DIAGNOSIS — C51.9 VULVAR CANCER, CARCINOMA (HCC): ICD-10-CM

## 2017-09-19 DIAGNOSIS — Z98.890 POST-OPERATIVE STATE: Primary | ICD-10-CM

## 2017-09-19 PROCEDURE — 99024 POSTOP FOLLOW-UP VISIT: CPT | Performed by: OBSTETRICS & GYNECOLOGY

## 2017-09-19 NOTE — PROGRESS NOTES
Edyta Joyce  [id number]  1970      Reason for Visit:  Postoperative evaluation    History of Present Illness:  Patient is a very pleasant 47 y.o. woman who presents for a post operative evaluation status post MODIFIED RADICAL VULVECTOMY, LEFT INGUINOFEMORAL SENTINAL LYMPH NODE DISSECTION, RIGHT INGUINOFEMORAL LYMPH NODE DISSECTION, MAPPING BIOPSIES OF VULVA performed on 8/30/2017.  Patient was seen by Dr. Copeland 9/7/2017 and NEETU drain was removed.    Today, patient notes ongoing discomfort.  She complains of right thigh swelling.  She has incisional discomfort.  She requests more inter-dry for wound care.  She has concerns about how her lichen sclerosis is going to be treated when she's completed healing.  Her pain is less in the vulvar area when compared to before surgery.  However, her chronic symptoms of lichen sclerosis make her very uncomfortable.    Past Medical History, Past Surgical History, Social History, Family History have been reviewed and are without significant changes except as mentioned.    Review of Systems   A comprehensive 12 point review of systems was performed and was negative except as mentioned.    Medications:  The current medication list was reviewed in the EMR    ALLERGIES:    Allergies   Allergen Reactions   • Diphenhydramine Swelling     Mono vs bendaryl. Hasn't had a problem.    • Penicillins Hives           /84  Pulse 57  Temp 97.7 °F (36.5 °C) (Temporal Artery )   Resp 14  Wt 159 lb (72.1 kg)  SpO2 96%  BMI 30.04 kg/m2       Physical Exam  Constitutional:  Patient is a pleasant woman in no acute distress.  Gastrointestinal: Abdomen is soft and appropriately tender.  There is no mass palpated.  There is no rebound or guarding.    Groin:  Left inguinal femoral incision is clean dry and intact.  Right inguinofemoral incision is clean dry and intact with underlying induration.  There is no erythema or findings concerning for infection.  Extremities:  Bilateral  lower extremities are non-tender.  Right thigh greater than left thigh, lower extremities otherwise symmetric.  No erythema.  Gynecologic: External genitalia are remarkable for a left-sided vulvar incision which is intact at the proximal portion and  at the perineal portion.  There is healthy granulation tissue visualized.  No active bleeding.    Pathology:  Final Diagnosis   1. LEFT INGUINAL FEMORAL SENTINEL NODE, EXCISION:  Lymph node x1; no tumor or granulomas identified.  2. RIGHT INGUINAL FEMORAL NODE, EXCISION:  Lymph nodes x2; no tumor or granulomas identified.   3. RIGHT INGUINAL LYMPH NODE, EXCISION:  Lymph node x5; no tumor or granulomas identified  4. LEFT VULVAR RESECTION:  Infiltrating squamous carcinoma.  Deep and peripheral margins of excision negative for tumor.  Tumor size 1.3x0.3x0.5 cm.  No lymphvascular invasion identified.   See Template.  5. RIGHT PERIURETHRAL BIOPSY:  Slightly keratinized skin with focal dermal chronic inflammation; no tumor identified.   6. RIGHT LABIA MINORA BIOPSY:  Lightly keratinized stratified squamous epithelium with mild dermal chronic inflammation.     VULVA   TYPE OF SPECIMEN/PROCEDURE: Left vulvar resection  SPECIMEN SIZE:  3.5x2.5x1.5 cm  TUMOR LOCATION: Left vulva   TUMOR SIZE:  1.3x0.3x0.5 cm  FOCALITY:  Unifocal  HISTOLOGIC TYPE:  Squamous carcinoma, focally keratinizing   HISTOLOGIC GRADE: Grade 2  DEPTH OF INVASION:  0.3 cm  VASCULAR/LYMPHATIC INVASION:  Absent   SURGICAL MARGINS (DISTANT FROM CLOSEST MARGIN):  Lateral margin closest margin at 0.5 cm  CONTIGUOUS SPREAD TO LOWER URETHRA, VAGINA, ANUS: Not observed   TUMOR INVADES UPPER URETHRA, BLADDER MUCOSA, RECTAL MUCOSA:  Not observed  TUMOR FIXED TO PELVIC BONE:  No  REGIONAL LYMPH NODE STATUS: Right inguinal node, right inguinal femoral lymph nodes, and left inguinal femoral sentinel node all negative for tumor  UNILATERAL/BILATERAL LYMPH NODE METASTASIS: Not observed  EXTRACAPSULAR EXTENSION:  N/A   OTHER METASTATIC SITES:  Unknown  OTHER NEOPLASM SITES:  Unknown  ADDITIONAL PATHOLOGIC FINDINGS: None    OTHER STUDIES: None  AJCC PATHOLOGIC STAGE:  (COMPLETED BY PATHOLOGIST, BASED ONLY ON TISSUE FINDINGS, MORE EXTENSIVE DISEASE MAY NOT BE KNOWN TO THE PATHOLOGIST)  pT= 1b  pN= 0  AJCC PATHOLOGIC STAGE:  IB          ASSESSMENT/PLAN:  Edyta Joyce returns for a post-operative evaluation today.  All pathology reports were given to patient.    I recommended observation for this early vulvar cancer.  We discussed patient performing self massage of the right thigh due to asymmetric edema.  Patient was reassured regarding mild seroma versus scarring of right groin.  She was given an additional supply of inter-dry for wound care.    Regarding her lichen sclerosis and anticipated need for ongoing treatment after recovery, compounding pharmacy and pharmacist were contacted regarding recommendations.  I strongly prefer nonsteroid-based treatment given patient's history of cutaneous cancer.      Overall, the patient is very pleased with her care.  I recommended continuation of post operative precautions as discussed.     She is to Return in about 2 weeks (around 10/3/2017).    Note: Speech recognition transcription software was used to dictate portions of this document.  An attempt at proofreading has been made though minor errors in transcription may still be present.  Please do not hesitate to call our office with any questions.    Isabela Nassar MD

## 2017-09-20 ENCOUNTER — DOCUMENTATION (OUTPATIENT)
Dept: ONCOLOGY | Facility: CLINIC | Age: 47
End: 2017-09-20

## 2017-09-20 NOTE — PROGRESS NOTES
9/19/2017  ~per Barbie  [facility] forms for pt.    GIOVANI signed and scanned into chart.   $15 fee paid.  Continuous leave from 8/28/2017-10/23/2017, RTW 10/23/2017.  Fax when complete.    9/20/2017  Rec'd form from [employer].    Completed form and gave back to Barbie.

## 2017-09-24 ENCOUNTER — TELEPHONE (OUTPATIENT)
Dept: GYNECOLOGIC ONCOLOGY | Facility: CLINIC | Age: 47
End: 2017-09-24

## 2017-09-24 ENCOUNTER — HOSPITAL ENCOUNTER (INPATIENT)
Facility: HOSPITAL | Age: 47
LOS: 5 days | Discharge: HOME | End: 2017-09-29
Attending: EMERGENCY MEDICINE | Admitting: OBSTETRICS & GYNECOLOGY

## 2017-09-24 ENCOUNTER — APPOINTMENT (OUTPATIENT)
Dept: CT IMAGING | Facility: HOSPITAL | Age: 47
End: 2017-09-24

## 2017-09-24 DIAGNOSIS — C51.9 VULVAR CANCER, CARCINOMA (HCC): ICD-10-CM

## 2017-09-24 DIAGNOSIS — A41.9 SEPSIS, DUE TO UNSPECIFIED ORGANISM: Primary | ICD-10-CM

## 2017-09-24 DIAGNOSIS — Z98.890 HISTORY OF GYNECOLOGIC SURGERY: ICD-10-CM

## 2017-09-24 DIAGNOSIS — R50.9 FEVER AND CHILLS: ICD-10-CM

## 2017-09-24 PROBLEM — A46 ERYSIPELAS: Status: ACTIVE | Noted: 2017-09-24

## 2017-09-24 LAB
ALBUMIN SERPL-MCNC: 4.1 G/DL (ref 3.2–4.8)
ALBUMIN/GLOB SERPL: 1.4 G/DL (ref 1.5–2.5)
ALP SERPL-CCNC: 53 U/L (ref 25–100)
ALT SERPL W P-5'-P-CCNC: 31 U/L (ref 7–40)
ANION GAP SERPL CALCULATED.3IONS-SCNC: 6 MMOL/L (ref 3–11)
AST SERPL-CCNC: 15 U/L (ref 0–33)
BACTERIA UR QL AUTO: ABNORMAL /HPF
BASOPHILS # BLD AUTO: 0.01 10*3/MM3 (ref 0–0.2)
BASOPHILS NFR BLD AUTO: 0.1 % (ref 0–1)
BILIRUB SERPL-MCNC: 0.6 MG/DL (ref 0.3–1.2)
BILIRUB UR QL STRIP: NEGATIVE
BUN BLD-MCNC: 11 MG/DL (ref 9–23)
BUN/CREAT SERPL: 15.7 (ref 7–25)
CALCIUM SPEC-SCNC: 8.8 MG/DL (ref 8.7–10.4)
CHLORIDE SERPL-SCNC: 102 MMOL/L (ref 99–109)
CLARITY UR: CLEAR
CO2 SERPL-SCNC: 27 MMOL/L (ref 20–31)
COLOR UR: YELLOW
CREAT BLD-MCNC: 0.7 MG/DL (ref 0.6–1.3)
D-LACTATE SERPL-SCNC: 2.2 MMOL/L (ref 0.5–2)
DEPRECATED RDW RBC AUTO: 42.5 FL (ref 37–54)
EOSINOPHIL # BLD AUTO: 0.01 10*3/MM3 (ref 0–0.3)
EOSINOPHIL NFR BLD AUTO: 0.1 % (ref 0–3)
ERYTHROCYTE [DISTWIDTH] IN BLOOD BY AUTOMATED COUNT: 12.7 % (ref 11.3–14.5)
GFR SERPL CREATININE-BSD FRML MDRD: 90 ML/MIN/1.73
GLOBULIN UR ELPH-MCNC: 3 GM/DL
GLUCOSE BLD-MCNC: 159 MG/DL (ref 70–100)
GLUCOSE UR STRIP-MCNC: NEGATIVE MG/DL
HCT VFR BLD AUTO: 39.6 % (ref 34.5–44)
HGB BLD-MCNC: 13.2 G/DL (ref 11.5–15.5)
HGB UR QL STRIP.AUTO: NEGATIVE
HOLD SPECIMEN: NORMAL
HOLD SPECIMEN: NORMAL
HYALINE CASTS UR QL AUTO: ABNORMAL /LPF
IMM GRANULOCYTES # BLD: 0.03 10*3/MM3 (ref 0–0.03)
IMM GRANULOCYTES NFR BLD: 0.2 % (ref 0–0.6)
KETONES UR QL STRIP: NEGATIVE
LEUKOCYTE ESTERASE UR QL STRIP.AUTO: ABNORMAL
LYMPHOCYTES # BLD AUTO: 0.51 10*3/MM3 (ref 0.6–4.8)
LYMPHOCYTES NFR BLD AUTO: 4 % (ref 24–44)
MCH RBC QN AUTO: 30.8 PG (ref 27–31)
MCHC RBC AUTO-ENTMCNC: 33.3 G/DL (ref 32–36)
MCV RBC AUTO: 92.3 FL (ref 80–99)
MONOCYTES # BLD AUTO: 0.64 10*3/MM3 (ref 0–1)
MONOCYTES NFR BLD AUTO: 5 % (ref 0–12)
NEUTROPHILS # BLD AUTO: 11.7 10*3/MM3 (ref 1.5–8.3)
NEUTROPHILS NFR BLD AUTO: 90.6 % (ref 41–71)
NITRITE UR QL STRIP: NEGATIVE
PH UR STRIP.AUTO: 8 [PH] (ref 5–8)
PLAT MORPH BLD: NORMAL
PLATELET # BLD AUTO: 170 10*3/MM3 (ref 150–450)
PMV BLD AUTO: 9.4 FL (ref 6–12)
POTASSIUM BLD-SCNC: 3.9 MMOL/L (ref 3.5–5.5)
PROT SERPL-MCNC: 7.1 G/DL (ref 5.7–8.2)
PROT UR QL STRIP: NEGATIVE
RBC # BLD AUTO: 4.29 10*6/MM3 (ref 3.89–5.14)
RBC # UR: ABNORMAL /HPF
RBC MORPH BLD: NORMAL
REF LAB TEST METHOD: ABNORMAL
SODIUM BLD-SCNC: 135 MMOL/L (ref 132–146)
SP GR UR STRIP: 1.02 (ref 1–1.03)
SQUAMOUS #/AREA URNS HPF: ABNORMAL /HPF
UROBILINOGEN UR QL STRIP: ABNORMAL
WBC MORPH BLD: NORMAL
WBC NRBC COR # BLD: 12.9 10*3/MM3 (ref 3.5–10.8)
WBC UR QL AUTO: ABNORMAL /HPF
WHOLE BLOOD HOLD SPECIMEN: NORMAL
WHOLE BLOOD HOLD SPECIMEN: NORMAL

## 2017-09-24 PROCEDURE — 80053 COMPREHEN METABOLIC PANEL: CPT | Performed by: EMERGENCY MEDICINE

## 2017-09-24 PROCEDURE — 85007 BL SMEAR W/DIFF WBC COUNT: CPT | Performed by: EMERGENCY MEDICINE

## 2017-09-24 PROCEDURE — 25010000002 ONDANSETRON PER 1 MG: Performed by: EMERGENCY MEDICINE

## 2017-09-24 PROCEDURE — 74176 CT ABD & PELVIS W/O CONTRAST: CPT

## 2017-09-24 PROCEDURE — 87086 URINE CULTURE/COLONY COUNT: CPT | Performed by: EMERGENCY MEDICINE

## 2017-09-24 PROCEDURE — 99284 EMERGENCY DEPT VISIT MOD MDM: CPT

## 2017-09-24 PROCEDURE — 25010000002 VANCOMYCIN HCL IN NACL 1.25-0.9 GM/250ML-% SOLUTION: Performed by: PHYSICIAN ASSISTANT

## 2017-09-24 PROCEDURE — 81001 URINALYSIS AUTO W/SCOPE: CPT | Performed by: EMERGENCY MEDICINE

## 2017-09-24 PROCEDURE — 85025 COMPLETE CBC W/AUTO DIFF WBC: CPT | Performed by: EMERGENCY MEDICINE

## 2017-09-24 PROCEDURE — 25010000002 MEROPENEM: Performed by: PHYSICIAN ASSISTANT

## 2017-09-24 PROCEDURE — 25010000002 HYDROMORPHONE PER 4 MG: Performed by: EMERGENCY MEDICINE

## 2017-09-24 PROCEDURE — 99222 1ST HOSP IP/OBS MODERATE 55: CPT | Performed by: OBSTETRICS & GYNECOLOGY

## 2017-09-24 PROCEDURE — 87147 CULTURE TYPE IMMUNOLOGIC: CPT | Performed by: EMERGENCY MEDICINE

## 2017-09-24 PROCEDURE — 83605 ASSAY OF LACTIC ACID: CPT | Performed by: EMERGENCY MEDICINE

## 2017-09-24 PROCEDURE — 87040 BLOOD CULTURE FOR BACTERIA: CPT | Performed by: EMERGENCY MEDICINE

## 2017-09-24 RX ORDER — HYDROCHLOROTHIAZIDE 12.5 MG/1
12.5 CAPSULE, GELATIN COATED ORAL
Status: DISCONTINUED | OUTPATIENT
Start: 2017-09-25 | End: 2017-09-29 | Stop reason: HOSPADM

## 2017-09-24 RX ORDER — LORAZEPAM 2 MG/ML
0.5 INJECTION INTRAMUSCULAR EVERY 6 HOURS PRN
Status: DISCONTINUED | OUTPATIENT
Start: 2017-09-24 | End: 2017-09-29 | Stop reason: HOSPADM

## 2017-09-24 RX ORDER — HYDROMORPHONE HYDROCHLORIDE 1 MG/ML
0.5 INJECTION, SOLUTION INTRAMUSCULAR; INTRAVENOUS; SUBCUTANEOUS EVERY 4 HOURS PRN
Status: DISCONTINUED | OUTPATIENT
Start: 2017-09-24 | End: 2017-09-26 | Stop reason: SDUPTHER

## 2017-09-24 RX ORDER — LISINOPRIL 10 MG/1
10 TABLET ORAL
Status: DISCONTINUED | OUTPATIENT
Start: 2017-09-25 | End: 2017-09-29 | Stop reason: HOSPADM

## 2017-09-24 RX ORDER — ONDANSETRON 2 MG/ML
4 INJECTION INTRAMUSCULAR; INTRAVENOUS ONCE
Status: COMPLETED | OUTPATIENT
Start: 2017-09-24 | End: 2017-09-24

## 2017-09-24 RX ORDER — PROMETHAZINE HYDROCHLORIDE 25 MG/ML
12.5 INJECTION, SOLUTION INTRAMUSCULAR; INTRAVENOUS EVERY 6 HOURS PRN
Status: DISCONTINUED | OUTPATIENT
Start: 2017-09-24 | End: 2017-09-29 | Stop reason: HOSPADM

## 2017-09-24 RX ORDER — OXYCODONE HYDROCHLORIDE 5 MG/1
10 TABLET ORAL EVERY 4 HOURS PRN
Status: DISCONTINUED | OUTPATIENT
Start: 2017-09-24 | End: 2017-09-29 | Stop reason: HOSPADM

## 2017-09-24 RX ORDER — SODIUM CHLORIDE 9 MG/ML
10 INJECTION, SOLUTION INTRAVENOUS CONTINUOUS
Status: DISCONTINUED | OUTPATIENT
Start: 2017-09-24 | End: 2017-09-29 | Stop reason: HOSPADM

## 2017-09-24 RX ORDER — ACETAMINOPHEN 325 MG/1
650 TABLET ORAL EVERY 4 HOURS PRN
Status: DISCONTINUED | OUTPATIENT
Start: 2017-09-24 | End: 2017-09-29 | Stop reason: HOSPADM

## 2017-09-24 RX ORDER — DOCUSATE SODIUM 100 MG/1
100 CAPSULE, LIQUID FILLED ORAL 2 TIMES DAILY PRN
Status: DISCONTINUED | OUTPATIENT
Start: 2017-09-24 | End: 2017-09-29 | Stop reason: HOSPADM

## 2017-09-24 RX ORDER — ACETAMINOPHEN 325 MG/1
650 TABLET ORAL ONCE
Status: COMPLETED | OUTPATIENT
Start: 2017-09-24 | End: 2017-09-24

## 2017-09-24 RX ORDER — ONDANSETRON 2 MG/ML
4 INJECTION INTRAMUSCULAR; INTRAVENOUS EVERY 6 HOURS PRN
Status: DISCONTINUED | OUTPATIENT
Start: 2017-09-24 | End: 2017-09-29 | Stop reason: HOSPADM

## 2017-09-24 RX ORDER — FAMOTIDINE 20 MG/1
20 TABLET, FILM COATED ORAL 2 TIMES DAILY
Status: DISCONTINUED | OUTPATIENT
Start: 2017-09-24 | End: 2017-09-29 | Stop reason: HOSPADM

## 2017-09-24 RX ORDER — OXYCODONE HYDROCHLORIDE 5 MG/1
5 TABLET ORAL EVERY 4 HOURS PRN
Status: DISCONTINUED | OUTPATIENT
Start: 2017-09-24 | End: 2017-09-29 | Stop reason: HOSPADM

## 2017-09-24 RX ORDER — SODIUM CHLORIDE 0.9 % (FLUSH) 0.9 %
10 SYRINGE (ML) INJECTION AS NEEDED
Status: DISCONTINUED | OUTPATIENT
Start: 2017-09-24 | End: 2017-09-29 | Stop reason: HOSPADM

## 2017-09-24 RX ORDER — IBUPROFEN 600 MG/1
600 TABLET ORAL EVERY 6 HOURS PRN
Status: DISCONTINUED | OUTPATIENT
Start: 2017-09-24 | End: 2017-09-29 | Stop reason: HOSPADM

## 2017-09-24 RX ORDER — HYDROMORPHONE HYDROCHLORIDE 1 MG/ML
0.5 INJECTION, SOLUTION INTRAMUSCULAR; INTRAVENOUS; SUBCUTANEOUS ONCE
Status: COMPLETED | OUTPATIENT
Start: 2017-09-24 | End: 2017-09-24

## 2017-09-24 RX ORDER — ROSUVASTATIN CALCIUM 20 MG/1
40 TABLET, COATED ORAL NIGHTLY
Status: DISCONTINUED | OUTPATIENT
Start: 2017-09-25 | End: 2017-09-29 | Stop reason: HOSPADM

## 2017-09-24 RX ORDER — OXYCODONE HYDROCHLORIDE 5 MG/1
5 CAPSULE ORAL EVERY 4 HOURS PRN
COMMUNITY
End: 2017-09-29 | Stop reason: HOSPADM

## 2017-09-24 RX ORDER — VANCOMYCIN HYDROCHLORIDE
20 ONCE
Status: COMPLETED | OUTPATIENT
Start: 2017-09-24 | End: 2017-09-24

## 2017-09-24 RX ORDER — SODIUM CHLORIDE 0.9 % (FLUSH) 0.9 %
1-10 SYRINGE (ML) INJECTION AS NEEDED
Status: DISCONTINUED | OUTPATIENT
Start: 2017-09-24 | End: 2017-09-29 | Stop reason: HOSPADM

## 2017-09-24 RX ADMIN — VANCOMYCIN HYDROCHLORIDE 1250 MG: 1 INJECTION, POWDER, LYOPHILIZED, FOR SOLUTION INTRAVENOUS at 22:52

## 2017-09-24 RX ADMIN — ACETAMINOPHEN 650 MG: 325 TABLET, FILM COATED ORAL at 21:01

## 2017-09-24 RX ADMIN — HYDROMORPHONE HYDROCHLORIDE 0.5 MG: 1 INJECTION, SOLUTION INTRAMUSCULAR; INTRAVENOUS; SUBCUTANEOUS at 21:01

## 2017-09-24 RX ADMIN — SODIUM CHLORIDE 1000 ML: 9 INJECTION, SOLUTION INTRAVENOUS at 22:53

## 2017-09-24 RX ADMIN — SODIUM CHLORIDE 1000 ML: 9 INJECTION, SOLUTION INTRAVENOUS at 21:01

## 2017-09-24 RX ADMIN — MEROPENEM 1 G: 1 INJECTION, POWDER, FOR SOLUTION INTRAVENOUS at 21:01

## 2017-09-24 RX ADMIN — FAMOTIDINE 20 MG: 20 TABLET ORAL at 23:14

## 2017-09-24 RX ADMIN — SODIUM CHLORIDE 100 ML/HR: 9 INJECTION, SOLUTION INTRAVENOUS at 22:51

## 2017-09-24 RX ADMIN — ONDANSETRON 4 MG: 2 INJECTION INTRAMUSCULAR; INTRAVENOUS at 21:01

## 2017-09-24 NOTE — TELEPHONE ENCOUNTER
GYN Oncology    Telephone Call    Patient's family calling and stating that her fever is now 101.5.  Advised that she needs to come to the ED for evaluation.      I called and spoke with triage to let them know she is coming.    Ora Sterling MD  09/24/17  6:06 PM

## 2017-09-24 NOTE — TELEPHONE ENCOUNTER
GYN Oncology    Telephone Call    Patient calling c/o low grade fever to 100 and poor appetite.  Has had an increase in pain at her vulvar incision site since yesterday.  Also has back pain.  Just took her first dose of Tylenol.  Advised to alternate tylenol and ibuprofen.  Is urinating and moved her bowels yesterday.  Only small amount of bleeding from the vulvar incision.    Offered ED evaluation today versus office appt tomorrow morning at 9:30 am.  She opts for the office.  Given strict precautions to call back today for fever 101 or greater, any worsening symptoms.    Ora Sterling MD  09/24/17  11:28 AM

## 2017-09-24 NOTE — TELEPHONE ENCOUNTER
GYN Oncology    Telephone Call    Patient calling back to report that she has noted redness inferior to the groin site with a small amount of drainage.  Counseled to jacqui the area.      She sounds more energetic than when I spoke with her earlier.  States she has been up and around.  Had some egg noodles and those stayed down.  Still not much of an appetite.  Fever has not been more than 100.2.    Again offered ED evaluation now versus office evaluation in the morning.  Counseled that the leading working diagnosis (without being able to see her and examine her) is a wound infection at one of the sites and plan will be to examine the surgical areas and begin antibiotics.  She sounds stable to be evaluated in the morning.  She concurs and this is her preference.    Ora Sterling MD  09/24/17  3:25 PM

## 2017-09-25 LAB
ANION GAP SERPL CALCULATED.3IONS-SCNC: 18 MMOL/L (ref 3–11)
BASOPHILS # BLD AUTO: 0.01 10*3/MM3 (ref 0–0.2)
BASOPHILS NFR BLD AUTO: 0.1 % (ref 0–1)
BUN BLD-MCNC: 9 MG/DL (ref 9–23)
BUN/CREAT SERPL: 15 (ref 7–25)
CALCIUM SPEC-SCNC: 7.8 MG/DL (ref 8.7–10.4)
CHLORIDE SERPL-SCNC: 105 MMOL/L (ref 99–109)
CO2 SERPL-SCNC: 17 MMOL/L (ref 20–31)
CREAT BLD-MCNC: 0.6 MG/DL (ref 0.6–1.3)
D-LACTATE SERPL-SCNC: 2 MMOL/L (ref 0.5–2)
DEPRECATED RDW RBC AUTO: 44.2 FL (ref 37–54)
EOSINOPHIL # BLD AUTO: 0.01 10*3/MM3 (ref 0–0.3)
EOSINOPHIL NFR BLD AUTO: 0.1 % (ref 0–3)
ERYTHROCYTE [DISTWIDTH] IN BLOOD BY AUTOMATED COUNT: 12.7 % (ref 11.3–14.5)
GFR SERPL CREATININE-BSD FRML MDRD: 107 ML/MIN/1.73
GLUCOSE BLD-MCNC: 118 MG/DL (ref 70–100)
HCT VFR BLD AUTO: 37.6 % (ref 34.5–44)
HGB BLD-MCNC: 12 G/DL (ref 11.5–15.5)
IMM GRANULOCYTES # BLD: 0.02 10*3/MM3 (ref 0–0.03)
IMM GRANULOCYTES NFR BLD: 0.2 % (ref 0–0.6)
LYMPHOCYTES # BLD AUTO: 0.56 10*3/MM3 (ref 0.6–4.8)
LYMPHOCYTES NFR BLD AUTO: 5.8 % (ref 24–44)
MCH RBC QN AUTO: 30.2 PG (ref 27–31)
MCHC RBC AUTO-ENTMCNC: 31.9 G/DL (ref 32–36)
MCV RBC AUTO: 94.5 FL (ref 80–99)
MONOCYTES # BLD AUTO: 0.7 10*3/MM3 (ref 0–1)
MONOCYTES NFR BLD AUTO: 7.2 % (ref 0–12)
NEUTROPHILS # BLD AUTO: 8.38 10*3/MM3 (ref 1.5–8.3)
NEUTROPHILS NFR BLD AUTO: 86.6 % (ref 41–71)
PLATELET # BLD AUTO: 157 10*3/MM3 (ref 150–450)
PMV BLD AUTO: 9.2 FL (ref 6–12)
POTASSIUM BLD-SCNC: 4.2 MMOL/L (ref 3.5–5.5)
RBC # BLD AUTO: 3.98 10*6/MM3 (ref 3.89–5.14)
SODIUM BLD-SCNC: 140 MMOL/L (ref 132–146)
WBC NRBC COR # BLD: 9.68 10*3/MM3 (ref 3.5–10.8)

## 2017-09-25 PROCEDURE — 25010000002 DIPHENHYDRAMINE PER 50 MG: Performed by: OBSTETRICS & GYNECOLOGY

## 2017-09-25 PROCEDURE — 83605 ASSAY OF LACTIC ACID: CPT | Performed by: OBSTETRICS & GYNECOLOGY

## 2017-09-25 PROCEDURE — 25010000002 VANCOMYCIN PER 500 MG

## 2017-09-25 PROCEDURE — 25010000002 ENOXAPARIN PER 10 MG: Performed by: OBSTETRICS & GYNECOLOGY

## 2017-09-25 PROCEDURE — 99232 SBSQ HOSP IP/OBS MODERATE 35: CPT | Performed by: OBSTETRICS & GYNECOLOGY

## 2017-09-25 PROCEDURE — 85025 COMPLETE CBC W/AUTO DIFF WBC: CPT | Performed by: OBSTETRICS & GYNECOLOGY

## 2017-09-25 PROCEDURE — 25010000002 HYDROMORPHONE PER 4 MG: Performed by: OBSTETRICS & GYNECOLOGY

## 2017-09-25 PROCEDURE — 80048 BASIC METABOLIC PNL TOTAL CA: CPT | Performed by: OBSTETRICS & GYNECOLOGY

## 2017-09-25 PROCEDURE — 25010000002 ONDANSETRON PER 1 MG: Performed by: OBSTETRICS & GYNECOLOGY

## 2017-09-25 PROCEDURE — 25010000002 MEROPENEM: Performed by: OBSTETRICS & GYNECOLOGY

## 2017-09-25 RX ORDER — DIPHENHYDRAMINE HYDROCHLORIDE 50 MG/ML
50 INJECTION INTRAMUSCULAR; INTRAVENOUS EVERY 12 HOURS SCHEDULED
Status: DISCONTINUED | OUTPATIENT
Start: 2017-09-25 | End: 2017-09-29 | Stop reason: HOSPADM

## 2017-09-25 RX ORDER — VANCOMYCIN HYDROCHLORIDE 1 G/200ML
15 INJECTION, SOLUTION INTRAVENOUS EVERY 12 HOURS
Status: DISCONTINUED | OUTPATIENT
Start: 2017-09-25 | End: 2017-09-29 | Stop reason: HOSPADM

## 2017-09-25 RX ADMIN — ONDANSETRON 4 MG: 2 INJECTION INTRAMUSCULAR; INTRAVENOUS at 08:01

## 2017-09-25 RX ADMIN — SODIUM CHLORIDE 100 ML/HR: 9 INJECTION, SOLUTION INTRAVENOUS at 12:29

## 2017-09-25 RX ADMIN — OXYCODONE HYDROCHLORIDE 10 MG: 5 TABLET ORAL at 12:29

## 2017-09-25 RX ADMIN — FAMOTIDINE 20 MG: 20 TABLET ORAL at 08:02

## 2017-09-25 RX ADMIN — OXYCODONE HYDROCHLORIDE 10 MG: 5 TABLET ORAL at 08:01

## 2017-09-25 RX ADMIN — DIPHENHYDRAMINE HYDROCHLORIDE 50 MG: 50 INJECTION INTRAMUSCULAR; INTRAVENOUS at 23:33

## 2017-09-25 RX ADMIN — ACETAMINOPHEN 650 MG: 325 TABLET, FILM COATED ORAL at 08:02

## 2017-09-25 RX ADMIN — DOCUSATE SODIUM 100 MG: 100 CAPSULE, LIQUID FILLED ORAL at 12:29

## 2017-09-25 RX ADMIN — ROSUVASTATIN CALCIUM 40 MG: 20 TABLET, FILM COATED ORAL at 21:29

## 2017-09-25 RX ADMIN — ESTROGENS, CONJUGATED 0.62 MG: 0.62 TABLET, FILM COATED ORAL at 12:29

## 2017-09-25 RX ADMIN — ONDANSETRON 4 MG: 2 INJECTION INTRAMUSCULAR; INTRAVENOUS at 23:35

## 2017-09-25 RX ADMIN — HYDROMORPHONE HYDROCHLORIDE 0.5 MG: 1 INJECTION, SOLUTION INTRAMUSCULAR; INTRAVENOUS; SUBCUTANEOUS at 03:28

## 2017-09-25 RX ADMIN — DIPHENHYDRAMINE HYDROCHLORIDE 50 MG: 50 INJECTION INTRAMUSCULAR; INTRAVENOUS at 12:45

## 2017-09-25 RX ADMIN — MEROPENEM 1 G: 1 INJECTION, POWDER, FOR SOLUTION INTRAVENOUS at 05:56

## 2017-09-25 RX ADMIN — FAMOTIDINE 20 MG: 20 TABLET ORAL at 18:04

## 2017-09-25 RX ADMIN — ACETAMINOPHEN 650 MG: 325 TABLET, FILM COATED ORAL at 23:33

## 2017-09-25 RX ADMIN — LISINOPRIL 10 MG: 10 TABLET ORAL at 08:02

## 2017-09-25 RX ADMIN — VANCOMYCIN HYDROCHLORIDE 1000 MG: 1 INJECTION, SOLUTION INTRAVENOUS at 13:04

## 2017-09-25 RX ADMIN — MEROPENEM 1 G: 1 INJECTION, POWDER, FOR SOLUTION INTRAVENOUS at 21:29

## 2017-09-25 RX ADMIN — ENOXAPARIN SODIUM 40 MG: 40 INJECTION SUBCUTANEOUS at 08:02

## 2017-09-25 RX ADMIN — ACETAMINOPHEN 650 MG: 325 TABLET, FILM COATED ORAL at 18:03

## 2017-09-25 RX ADMIN — MEROPENEM 1 G: 1 INJECTION, POWDER, FOR SOLUTION INTRAVENOUS at 15:15

## 2017-09-25 RX ADMIN — OXYCODONE HYDROCHLORIDE 5 MG: 5 TABLET ORAL at 00:15

## 2017-09-25 RX ADMIN — OXYCODONE HYDROCHLORIDE 10 MG: 5 TABLET ORAL at 18:03

## 2017-09-26 LAB
ANION GAP SERPL CALCULATED.3IONS-SCNC: 2 MMOL/L (ref 3–11)
BACTERIA SPEC AEROBE CULT: ABNORMAL
BASOPHILS # BLD AUTO: 0.02 10*3/MM3 (ref 0–0.2)
BASOPHILS NFR BLD AUTO: 0.2 % (ref 0–1)
BUN BLD-MCNC: 6 MG/DL (ref 9–23)
BUN/CREAT SERPL: 10 (ref 7–25)
CALCIUM SPEC-SCNC: 8 MG/DL (ref 8.7–10.4)
CHLORIDE SERPL-SCNC: 104 MMOL/L (ref 99–109)
CO2 SERPL-SCNC: 31 MMOL/L (ref 20–31)
CREAT BLD-MCNC: 0.6 MG/DL (ref 0.6–1.3)
DEPRECATED RDW RBC AUTO: 44.9 FL (ref 37–54)
EOSINOPHIL # BLD AUTO: 0.12 10*3/MM3 (ref 0–0.3)
EOSINOPHIL NFR BLD AUTO: 1.2 % (ref 0–3)
ERYTHROCYTE [DISTWIDTH] IN BLOOD BY AUTOMATED COUNT: 13 % (ref 11.3–14.5)
GFR SERPL CREATININE-BSD FRML MDRD: 107 ML/MIN/1.73
GLUCOSE BLD-MCNC: 98 MG/DL (ref 70–100)
HCT VFR BLD AUTO: 34.1 % (ref 34.5–44)
HGB BLD-MCNC: 11.3 G/DL (ref 11.5–15.5)
IMM GRANULOCYTES # BLD: 0.02 10*3/MM3 (ref 0–0.03)
IMM GRANULOCYTES NFR BLD: 0.2 % (ref 0–0.6)
LYMPHOCYTES # BLD AUTO: 1.32 10*3/MM3 (ref 0.6–4.8)
LYMPHOCYTES NFR BLD AUTO: 13 % (ref 24–44)
MCH RBC QN AUTO: 31.3 PG (ref 27–31)
MCHC RBC AUTO-ENTMCNC: 33.1 G/DL (ref 32–36)
MCV RBC AUTO: 94.5 FL (ref 80–99)
MONOCYTES # BLD AUTO: 0.81 10*3/MM3 (ref 0–1)
MONOCYTES NFR BLD AUTO: 7.9 % (ref 0–12)
NEUTROPHILS # BLD AUTO: 7.9 10*3/MM3 (ref 1.5–8.3)
NEUTROPHILS NFR BLD AUTO: 77.5 % (ref 41–71)
PLATELET # BLD AUTO: 142 10*3/MM3 (ref 150–450)
PMV BLD AUTO: 9.1 FL (ref 6–12)
POTASSIUM BLD-SCNC: 3.9 MMOL/L (ref 3.5–5.5)
RBC # BLD AUTO: 3.61 10*6/MM3 (ref 3.89–5.14)
SODIUM BLD-SCNC: 137 MMOL/L (ref 132–146)
STREP GROUPING: ABNORMAL
VANCOMYCIN TROUGH SERPL-MCNC: 9 MCG/ML (ref 10–20)
WBC NRBC COR # BLD: 10.19 10*3/MM3 (ref 3.5–10.8)

## 2017-09-26 PROCEDURE — 25010000002 LORAZEPAM PER 2 MG: Performed by: OBSTETRICS & GYNECOLOGY

## 2017-09-26 PROCEDURE — 87205 SMEAR GRAM STAIN: CPT | Performed by: OBSTETRICS & GYNECOLOGY

## 2017-09-26 PROCEDURE — 25010000002 ENOXAPARIN PER 10 MG: Performed by: OBSTETRICS & GYNECOLOGY

## 2017-09-26 PROCEDURE — 85025 COMPLETE CBC W/AUTO DIFF WBC: CPT | Performed by: OBSTETRICS & GYNECOLOGY

## 2017-09-26 PROCEDURE — 25010000002 HYDROMORPHONE PER 4 MG: Performed by: OBSTETRICS & GYNECOLOGY

## 2017-09-26 PROCEDURE — 80202 ASSAY OF VANCOMYCIN: CPT

## 2017-09-26 PROCEDURE — 10140 I&D HMTMA SEROMA/FLUID COLLJ: CPT | Performed by: OBSTETRICS & GYNECOLOGY

## 2017-09-26 PROCEDURE — 0H9AXZX DRAINAGE OF INGUINAL SKIN, EXTERNAL APPROACH, DIAGNOSTIC: ICD-10-PCS | Performed by: OBSTETRICS & GYNECOLOGY

## 2017-09-26 PROCEDURE — 25010000002 MEROPENEM: Performed by: OBSTETRICS & GYNECOLOGY

## 2017-09-26 PROCEDURE — 80048 BASIC METABOLIC PNL TOTAL CA: CPT | Performed by: OBSTETRICS & GYNECOLOGY

## 2017-09-26 PROCEDURE — 25010000002 DIPHENHYDRAMINE PER 50 MG: Performed by: OBSTETRICS & GYNECOLOGY

## 2017-09-26 PROCEDURE — 87070 CULTURE OTHR SPECIMN AEROBIC: CPT | Performed by: OBSTETRICS & GYNECOLOGY

## 2017-09-26 PROCEDURE — 87186 SC STD MICRODIL/AGAR DIL: CPT | Performed by: OBSTETRICS & GYNECOLOGY

## 2017-09-26 PROCEDURE — 87147 CULTURE TYPE IMMUNOLOGIC: CPT | Performed by: OBSTETRICS & GYNECOLOGY

## 2017-09-26 PROCEDURE — 25010000002 VANCOMYCIN PER 500 MG

## 2017-09-26 PROCEDURE — 87077 CULTURE AEROBIC IDENTIFY: CPT | Performed by: OBSTETRICS & GYNECOLOGY

## 2017-09-26 RX ORDER — LORAZEPAM 2 MG/ML
1 INJECTION INTRAMUSCULAR ONCE
Status: COMPLETED | OUTPATIENT
Start: 2017-09-26 | End: 2017-09-26

## 2017-09-26 RX ORDER — HYDROMORPHONE HYDROCHLORIDE 1 MG/ML
0.5 INJECTION, SOLUTION INTRAMUSCULAR; INTRAVENOUS; SUBCUTANEOUS
Status: DISCONTINUED | OUTPATIENT
Start: 2017-09-26 | End: 2017-09-29 | Stop reason: HOSPADM

## 2017-09-26 RX ORDER — LIDOCAINE HYDROCHLORIDE 10 MG/ML
INJECTION, SOLUTION EPIDURAL; INFILTRATION; INTRACAUDAL; PERINEURAL
Status: COMPLETED
Start: 2017-09-26 | End: 2017-09-26

## 2017-09-26 RX ADMIN — ESTROGENS, CONJUGATED 0.62 MG: 0.62 TABLET, FILM COATED ORAL at 08:32

## 2017-09-26 RX ADMIN — MEROPENEM 1 G: 1 INJECTION, POWDER, FOR SOLUTION INTRAVENOUS at 22:09

## 2017-09-26 RX ADMIN — HYDROMORPHONE HYDROCHLORIDE 0.5 MG: 1 INJECTION, SOLUTION INTRAMUSCULAR; INTRAVENOUS; SUBCUTANEOUS at 05:17

## 2017-09-26 RX ADMIN — HYDROMORPHONE HYDROCHLORIDE 0.5 MG: 1 INJECTION, SOLUTION INTRAMUSCULAR; INTRAVENOUS; SUBCUTANEOUS at 17:26

## 2017-09-26 RX ADMIN — MEROPENEM 1 G: 1 INJECTION, POWDER, FOR SOLUTION INTRAVENOUS at 16:37

## 2017-09-26 RX ADMIN — HYDROCHLOROTHIAZIDE 12.5 MG: 12.5 CAPSULE ORAL at 08:32

## 2017-09-26 RX ADMIN — VANCOMYCIN HYDROCHLORIDE 1000 MG: 1 INJECTION, SOLUTION INTRAVENOUS at 14:41

## 2017-09-26 RX ADMIN — MEROPENEM 1 G: 1 INJECTION, POWDER, FOR SOLUTION INTRAVENOUS at 05:22

## 2017-09-26 RX ADMIN — SODIUM CHLORIDE 100 ML/HR: 9 INJECTION, SOLUTION INTRAVENOUS at 21:28

## 2017-09-26 RX ADMIN — LIDOCAINE HYDROCHLORIDE: 10 INJECTION, SOLUTION EPIDURAL; INFILTRATION; INTRACAUDAL; PERINEURAL at 18:48

## 2017-09-26 RX ADMIN — FAMOTIDINE 20 MG: 20 TABLET ORAL at 08:33

## 2017-09-26 RX ADMIN — ENOXAPARIN SODIUM 40 MG: 40 INJECTION SUBCUTANEOUS at 08:33

## 2017-09-26 RX ADMIN — Medication: at 13:20

## 2017-09-26 RX ADMIN — IBUPROFEN 600 MG: 600 TABLET, FILM COATED ORAL at 12:54

## 2017-09-26 RX ADMIN — ACETAMINOPHEN 650 MG: 325 TABLET, FILM COATED ORAL at 05:26

## 2017-09-26 RX ADMIN — DIPHENHYDRAMINE HYDROCHLORIDE 50 MG: 50 INJECTION INTRAMUSCULAR; INTRAVENOUS at 23:36

## 2017-09-26 RX ADMIN — SODIUM CHLORIDE 100 ML/HR: 9 INJECTION, SOLUTION INTRAVENOUS at 07:10

## 2017-09-26 RX ADMIN — IBUPROFEN 600 MG: 600 TABLET, FILM COATED ORAL at 09:36

## 2017-09-26 RX ADMIN — DIPHENHYDRAMINE HYDROCHLORIDE 50 MG: 50 INJECTION INTRAMUSCULAR; INTRAVENOUS at 13:18

## 2017-09-26 RX ADMIN — VANCOMYCIN HYDROCHLORIDE 1000 MG: 1 INJECTION, SOLUTION INTRAVENOUS at 00:12

## 2017-09-26 RX ADMIN — DOCUSATE SODIUM 100 MG: 100 CAPSULE, LIQUID FILLED ORAL at 09:39

## 2017-09-26 RX ADMIN — FAMOTIDINE 20 MG: 20 TABLET ORAL at 16:38

## 2017-09-26 RX ADMIN — LISINOPRIL 10 MG: 10 TABLET ORAL at 08:38

## 2017-09-26 RX ADMIN — LORAZEPAM 1 MG: 2 INJECTION INTRAMUSCULAR; INTRAVENOUS at 17:26

## 2017-09-26 RX ADMIN — ROSUVASTATIN CALCIUM 40 MG: 20 TABLET, FILM COATED ORAL at 21:28

## 2017-09-27 ENCOUNTER — APPOINTMENT (OUTPATIENT)
Dept: CT IMAGING | Facility: HOSPITAL | Age: 47
End: 2017-09-27

## 2017-09-27 LAB
ANION GAP SERPL CALCULATED.3IONS-SCNC: 4 MMOL/L (ref 3–11)
APTT PPP: 27.5 SECONDS (ref 24–31)
BASOPHILS # BLD AUTO: 0.01 10*3/MM3 (ref 0–0.2)
BASOPHILS NFR BLD AUTO: 0.1 % (ref 0–1)
BUN BLD-MCNC: 6 MG/DL (ref 9–23)
BUN/CREAT SERPL: 10 (ref 7–25)
CALCIUM SPEC-SCNC: 8.5 MG/DL (ref 8.7–10.4)
CHLORIDE SERPL-SCNC: 104 MMOL/L (ref 99–109)
CO2 SERPL-SCNC: 29 MMOL/L (ref 20–31)
CREAT BLD-MCNC: 0.6 MG/DL (ref 0.6–1.3)
DEPRECATED RDW RBC AUTO: 43.6 FL (ref 37–54)
EOSINOPHIL # BLD AUTO: 0.27 10*3/MM3 (ref 0–0.3)
EOSINOPHIL NFR BLD AUTO: 2.8 % (ref 0–3)
ERYTHROCYTE [DISTWIDTH] IN BLOOD BY AUTOMATED COUNT: 12.7 % (ref 11.3–14.5)
GFR SERPL CREATININE-BSD FRML MDRD: 107 ML/MIN/1.73
GLUCOSE BLD-MCNC: 128 MG/DL (ref 70–100)
HCT VFR BLD AUTO: 33.1 % (ref 34.5–44)
HGB BLD-MCNC: 10.8 G/DL (ref 11.5–15.5)
IMM GRANULOCYTES # BLD: 0.01 10*3/MM3 (ref 0–0.03)
IMM GRANULOCYTES NFR BLD: 0.1 % (ref 0–0.6)
INR PPP: 0.9
LYMPHOCYTES # BLD AUTO: 1.22 10*3/MM3 (ref 0.6–4.8)
LYMPHOCYTES NFR BLD AUTO: 12.7 % (ref 24–44)
MCH RBC QN AUTO: 30.6 PG (ref 27–31)
MCHC RBC AUTO-ENTMCNC: 32.6 G/DL (ref 32–36)
MCV RBC AUTO: 93.8 FL (ref 80–99)
MONOCYTES # BLD AUTO: 0.68 10*3/MM3 (ref 0–1)
MONOCYTES NFR BLD AUTO: 7.1 % (ref 0–12)
NEUTROPHILS # BLD AUTO: 7.43 10*3/MM3 (ref 1.5–8.3)
NEUTROPHILS NFR BLD AUTO: 77.2 % (ref 41–71)
PLATELET # BLD AUTO: 167 10*3/MM3 (ref 150–450)
PMV BLD AUTO: 9.8 FL (ref 6–12)
POTASSIUM BLD-SCNC: 3.9 MMOL/L (ref 3.5–5.5)
PROTHROMBIN TIME: 9.8 SECONDS (ref 9.6–11.5)
RBC # BLD AUTO: 3.53 10*6/MM3 (ref 3.89–5.14)
SODIUM BLD-SCNC: 137 MMOL/L (ref 132–146)
WBC NRBC COR # BLD: 9.62 10*3/MM3 (ref 3.5–10.8)

## 2017-09-27 PROCEDURE — 85730 THROMBOPLASTIN TIME PARTIAL: CPT | Performed by: OBSTETRICS & GYNECOLOGY

## 2017-09-27 PROCEDURE — 87070 CULTURE OTHR SPECIMN AEROBIC: CPT | Performed by: OBSTETRICS & GYNECOLOGY

## 2017-09-27 PROCEDURE — 87205 SMEAR GRAM STAIN: CPT | Performed by: OBSTETRICS & GYNECOLOGY

## 2017-09-27 PROCEDURE — 25010000002 DIPHENHYDRAMINE PER 50 MG: Performed by: OBSTETRICS & GYNECOLOGY

## 2017-09-27 PROCEDURE — 85610 PROTHROMBIN TIME: CPT | Performed by: OBSTETRICS & GYNECOLOGY

## 2017-09-27 PROCEDURE — 25010000002 MEROPENEM: Performed by: OBSTETRICS & GYNECOLOGY

## 2017-09-27 PROCEDURE — 25010000002 LORAZEPAM PER 2 MG: Performed by: OBSTETRICS & GYNECOLOGY

## 2017-09-27 PROCEDURE — 85025 COMPLETE CBC W/AUTO DIFF WBC: CPT | Performed by: OBSTETRICS & GYNECOLOGY

## 2017-09-27 PROCEDURE — 80048 BASIC METABOLIC PNL TOTAL CA: CPT | Performed by: OBSTETRICS & GYNECOLOGY

## 2017-09-27 PROCEDURE — 87075 CULTR BACTERIA EXCEPT BLOOD: CPT | Performed by: OBSTETRICS & GYNECOLOGY

## 2017-09-27 PROCEDURE — 99232 SBSQ HOSP IP/OBS MODERATE 35: CPT | Performed by: OBSTETRICS & GYNECOLOGY

## 2017-09-27 PROCEDURE — 25010000002 VANCOMYCIN PER 500 MG

## 2017-09-27 PROCEDURE — 87015 SPECIMEN INFECT AGNT CONCNTJ: CPT | Performed by: OBSTETRICS & GYNECOLOGY

## 2017-09-27 RX ORDER — LORAZEPAM 2 MG/ML
1 INJECTION INTRAMUSCULAR ONCE
Status: COMPLETED | OUTPATIENT
Start: 2017-09-27 | End: 2017-09-27

## 2017-09-27 RX ADMIN — LISINOPRIL 10 MG: 10 TABLET ORAL at 08:37

## 2017-09-27 RX ADMIN — DIPHENHYDRAMINE HYDROCHLORIDE 50 MG: 50 INJECTION INTRAMUSCULAR; INTRAVENOUS at 12:20

## 2017-09-27 RX ADMIN — ACETAMINOPHEN 650 MG: 325 TABLET, FILM COATED ORAL at 04:39

## 2017-09-27 RX ADMIN — ROSUVASTATIN CALCIUM 40 MG: 20 TABLET, FILM COATED ORAL at 21:10

## 2017-09-27 RX ADMIN — LORAZEPAM 0.5 MG: 2 INJECTION INTRAMUSCULAR; INTRAVENOUS at 00:34

## 2017-09-27 RX ADMIN — LORAZEPAM 1 MG: 2 INJECTION INTRAMUSCULAR; INTRAVENOUS at 10:07

## 2017-09-27 RX ADMIN — ESTROGENS, CONJUGATED 0.62 MG: 0.62 TABLET, FILM COATED ORAL at 08:37

## 2017-09-27 RX ADMIN — VANCOMYCIN HYDROCHLORIDE 1000 MG: 1 INJECTION, SOLUTION INTRAVENOUS at 00:30

## 2017-09-27 RX ADMIN — IBUPROFEN 600 MG: 600 TABLET, FILM COATED ORAL at 09:37

## 2017-09-27 RX ADMIN — HYDROCHLOROTHIAZIDE 12.5 MG: 12.5 CAPSULE ORAL at 08:37

## 2017-09-27 RX ADMIN — MEROPENEM 1 G: 1 INJECTION, POWDER, FOR SOLUTION INTRAVENOUS at 21:09

## 2017-09-27 RX ADMIN — MEROPENEM 1 G: 1 INJECTION, POWDER, FOR SOLUTION INTRAVENOUS at 14:19

## 2017-09-27 RX ADMIN — OXYCODONE HYDROCHLORIDE 10 MG: 5 TABLET ORAL at 12:46

## 2017-09-27 RX ADMIN — FAMOTIDINE 20 MG: 20 TABLET ORAL at 08:37

## 2017-09-27 RX ADMIN — DIPHENHYDRAMINE HYDROCHLORIDE 50 MG: 50 INJECTION INTRAMUSCULAR; INTRAVENOUS at 23:46

## 2017-09-27 RX ADMIN — VANCOMYCIN HYDROCHLORIDE 1000 MG: 1 INJECTION, SOLUTION INTRAVENOUS at 12:20

## 2017-09-27 RX ADMIN — MEROPENEM 1 G: 1 INJECTION, POWDER, FOR SOLUTION INTRAVENOUS at 06:13

## 2017-09-28 LAB
ANION GAP SERPL CALCULATED.3IONS-SCNC: 5 MMOL/L (ref 3–11)
BASOPHILS # BLD AUTO: 0.03 10*3/MM3 (ref 0–0.2)
BASOPHILS NFR BLD AUTO: 0.4 % (ref 0–1)
BUN BLD-MCNC: 6 MG/DL (ref 9–23)
BUN/CREAT SERPL: 10 (ref 7–25)
CALCIUM SPEC-SCNC: 8.1 MG/DL (ref 8.7–10.4)
CHLORIDE SERPL-SCNC: 102 MMOL/L (ref 99–109)
CO2 SERPL-SCNC: 31 MMOL/L (ref 20–31)
CREAT BLD-MCNC: 0.6 MG/DL (ref 0.6–1.3)
DEPRECATED RDW RBC AUTO: 42.7 FL (ref 37–54)
EOSINOPHIL # BLD AUTO: 0.35 10*3/MM3 (ref 0–0.3)
EOSINOPHIL NFR BLD AUTO: 5.1 % (ref 0–3)
ERYTHROCYTE [DISTWIDTH] IN BLOOD BY AUTOMATED COUNT: 12.6 % (ref 11.3–14.5)
GFR SERPL CREATININE-BSD FRML MDRD: 107 ML/MIN/1.73
GLUCOSE BLD-MCNC: 87 MG/DL (ref 70–100)
HCT VFR BLD AUTO: 32.8 % (ref 34.5–44)
HGB BLD-MCNC: 10.7 G/DL (ref 11.5–15.5)
IMM GRANULOCYTES # BLD: 0.01 10*3/MM3 (ref 0–0.03)
IMM GRANULOCYTES NFR BLD: 0.1 % (ref 0–0.6)
LYMPHOCYTES # BLD AUTO: 1.48 10*3/MM3 (ref 0.6–4.8)
LYMPHOCYTES NFR BLD AUTO: 21.4 % (ref 24–44)
MCH RBC QN AUTO: 30.7 PG (ref 27–31)
MCHC RBC AUTO-ENTMCNC: 32.6 G/DL (ref 32–36)
MCV RBC AUTO: 94 FL (ref 80–99)
MONOCYTES # BLD AUTO: 0.62 10*3/MM3 (ref 0–1)
MONOCYTES NFR BLD AUTO: 9 % (ref 0–12)
NEUTROPHILS # BLD AUTO: 4.42 10*3/MM3 (ref 1.5–8.3)
NEUTROPHILS NFR BLD AUTO: 64 % (ref 41–71)
PLATELET # BLD AUTO: 192 10*3/MM3 (ref 150–450)
PMV BLD AUTO: 9.3 FL (ref 6–12)
POTASSIUM BLD-SCNC: 4 MMOL/L (ref 3.5–5.5)
RBC # BLD AUTO: 3.49 10*6/MM3 (ref 3.89–5.14)
SODIUM BLD-SCNC: 138 MMOL/L (ref 132–146)
WBC NRBC COR # BLD: 6.91 10*3/MM3 (ref 3.5–10.8)

## 2017-09-28 PROCEDURE — 25010000002 MEROPENEM: Performed by: OBSTETRICS & GYNECOLOGY

## 2017-09-28 PROCEDURE — 80048 BASIC METABOLIC PNL TOTAL CA: CPT | Performed by: OBSTETRICS & GYNECOLOGY

## 2017-09-28 PROCEDURE — 85025 COMPLETE CBC W/AUTO DIFF WBC: CPT | Performed by: OBSTETRICS & GYNECOLOGY

## 2017-09-28 PROCEDURE — 99231 SBSQ HOSP IP/OBS SF/LOW 25: CPT | Performed by: OBSTETRICS & GYNECOLOGY

## 2017-09-28 PROCEDURE — 25010000002 VANCOMYCIN PER 500 MG

## 2017-09-28 PROCEDURE — 25010000002 DIPHENHYDRAMINE PER 50 MG: Performed by: OBSTETRICS & GYNECOLOGY

## 2017-09-28 PROCEDURE — 25010000002 LORAZEPAM PER 2 MG: Performed by: OBSTETRICS & GYNECOLOGY

## 2017-09-28 RX ADMIN — MEROPENEM 1 G: 1 INJECTION, POWDER, FOR SOLUTION INTRAVENOUS at 05:44

## 2017-09-28 RX ADMIN — LORAZEPAM 0.5 MG: 2 INJECTION INTRAMUSCULAR; INTRAVENOUS at 00:23

## 2017-09-28 RX ADMIN — VANCOMYCIN HYDROCHLORIDE 1000 MG: 1 INJECTION, SOLUTION INTRAVENOUS at 00:19

## 2017-09-28 RX ADMIN — FAMOTIDINE 20 MG: 20 TABLET ORAL at 08:29

## 2017-09-28 RX ADMIN — ESTROGENS, CONJUGATED 0.62 MG: 0.62 TABLET, FILM COATED ORAL at 08:32

## 2017-09-28 RX ADMIN — MEROPENEM 1 G: 1 INJECTION, POWDER, FOR SOLUTION INTRAVENOUS at 14:43

## 2017-09-28 RX ADMIN — LISINOPRIL 10 MG: 10 TABLET ORAL at 08:29

## 2017-09-28 RX ADMIN — ACETAMINOPHEN 650 MG: 325 TABLET, FILM COATED ORAL at 14:46

## 2017-09-28 RX ADMIN — FAMOTIDINE 20 MG: 20 TABLET ORAL at 18:06

## 2017-09-28 RX ADMIN — HYDROCHLOROTHIAZIDE 12.5 MG: 12.5 CAPSULE ORAL at 08:32

## 2017-09-28 RX ADMIN — ROSUVASTATIN CALCIUM 40 MG: 20 TABLET, FILM COATED ORAL at 21:26

## 2017-09-28 RX ADMIN — DIPHENHYDRAMINE HYDROCHLORIDE 50 MG: 50 INJECTION INTRAMUSCULAR; INTRAVENOUS at 11:55

## 2017-09-28 RX ADMIN — MEROPENEM 1 G: 1 INJECTION, POWDER, FOR SOLUTION INTRAVENOUS at 21:25

## 2017-09-28 RX ADMIN — DIPHENHYDRAMINE HYDROCHLORIDE 50 MG: 50 INJECTION INTRAMUSCULAR; INTRAVENOUS at 23:32

## 2017-09-28 RX ADMIN — VANCOMYCIN HYDROCHLORIDE 1000 MG: 1 INJECTION, SOLUTION INTRAVENOUS at 12:26

## 2017-09-29 VITALS
BODY MASS INDEX: 29.88 KG/M2 | SYSTOLIC BLOOD PRESSURE: 124 MMHG | HEART RATE: 74 BPM | RESPIRATION RATE: 18 BRPM | TEMPERATURE: 98.2 F | HEIGHT: 61 IN | OXYGEN SATURATION: 98 % | DIASTOLIC BLOOD PRESSURE: 80 MMHG | WEIGHT: 158.25 LBS

## 2017-09-29 LAB
ANION GAP SERPL CALCULATED.3IONS-SCNC: 9 MMOL/L (ref 3–11)
BACTERIA SPEC AEROBE CULT: NORMAL
BACTERIA SPEC AEROBE CULT: NORMAL
BASOPHILS # BLD AUTO: 0.02 10*3/MM3 (ref 0–0.2)
BASOPHILS NFR BLD AUTO: 0.3 % (ref 0–1)
BUN BLD-MCNC: 6 MG/DL (ref 9–23)
BUN/CREAT SERPL: 10 (ref 7–25)
CALCIUM SPEC-SCNC: 8.9 MG/DL (ref 8.7–10.4)
CHLORIDE SERPL-SCNC: 101 MMOL/L (ref 99–109)
CO2 SERPL-SCNC: 31 MMOL/L (ref 20–31)
CREAT BLD-MCNC: 0.6 MG/DL (ref 0.6–1.3)
DEPRECATED RDW RBC AUTO: 40.9 FL (ref 37–54)
EOSINOPHIL # BLD AUTO: 0.34 10*3/MM3 (ref 0–0.3)
EOSINOPHIL NFR BLD AUTO: 4.7 % (ref 0–3)
ERYTHROCYTE [DISTWIDTH] IN BLOOD BY AUTOMATED COUNT: 12.1 % (ref 11.3–14.5)
GFR SERPL CREATININE-BSD FRML MDRD: 107 ML/MIN/1.73
GLUCOSE BLD-MCNC: 86 MG/DL (ref 70–100)
HCT VFR BLD AUTO: 34.9 % (ref 34.5–44)
HGB BLD-MCNC: 11.4 G/DL (ref 11.5–15.5)
IMM GRANULOCYTES # BLD: 0.02 10*3/MM3 (ref 0–0.03)
IMM GRANULOCYTES NFR BLD: 0.3 % (ref 0–0.6)
LYMPHOCYTES # BLD AUTO: 1.73 10*3/MM3 (ref 0.6–4.8)
LYMPHOCYTES NFR BLD AUTO: 23.7 % (ref 24–44)
MCH RBC QN AUTO: 30.1 PG (ref 27–31)
MCHC RBC AUTO-ENTMCNC: 32.7 G/DL (ref 32–36)
MCV RBC AUTO: 92.1 FL (ref 80–99)
MONOCYTES # BLD AUTO: 0.7 10*3/MM3 (ref 0–1)
MONOCYTES NFR BLD AUTO: 9.6 % (ref 0–12)
NEUTROPHILS # BLD AUTO: 4.49 10*3/MM3 (ref 1.5–8.3)
NEUTROPHILS NFR BLD AUTO: 61.4 % (ref 41–71)
PLATELET # BLD AUTO: 238 10*3/MM3 (ref 150–450)
PMV BLD AUTO: 9.1 FL (ref 6–12)
POTASSIUM BLD-SCNC: 4 MMOL/L (ref 3.5–5.5)
RBC # BLD AUTO: 3.79 10*6/MM3 (ref 3.89–5.14)
SODIUM BLD-SCNC: 141 MMOL/L (ref 132–146)
WBC NRBC COR # BLD: 7.3 10*3/MM3 (ref 3.5–10.8)

## 2017-09-29 PROCEDURE — 25010000002 VANCOMYCIN PER 500 MG

## 2017-09-29 PROCEDURE — 99238 HOSP IP/OBS DSCHRG MGMT 30/<: CPT | Performed by: OBSTETRICS & GYNECOLOGY

## 2017-09-29 PROCEDURE — 85025 COMPLETE CBC W/AUTO DIFF WBC: CPT | Performed by: OBSTETRICS & GYNECOLOGY

## 2017-09-29 PROCEDURE — 25010000002 MEROPENEM: Performed by: OBSTETRICS & GYNECOLOGY

## 2017-09-29 PROCEDURE — 25010000002 LORAZEPAM PER 2 MG: Performed by: OBSTETRICS & GYNECOLOGY

## 2017-09-29 PROCEDURE — 80048 BASIC METABOLIC PNL TOTAL CA: CPT | Performed by: OBSTETRICS & GYNECOLOGY

## 2017-09-29 RX ORDER — IBUPROFEN 600 MG/1
600 TABLET ORAL EVERY 6 HOURS PRN
Qty: 30 TABLET | Refills: 0 | Status: ON HOLD | OUTPATIENT
Start: 2017-09-29 | End: 2017-10-27

## 2017-09-29 RX ORDER — SULFAMETHOXAZOLE AND TRIMETHOPRIM 800; 160 MG/1; MG/1
1 TABLET ORAL 2 TIMES DAILY
Qty: 18 TABLET | Refills: 0 | Status: SHIPPED | OUTPATIENT
Start: 2017-09-29 | End: 2017-10-08

## 2017-09-29 RX ORDER — ACETAMINOPHEN 325 MG/1
650 TABLET ORAL EVERY 4 HOURS PRN
Qty: 30 TABLET | Refills: 0 | Status: SHIPPED | OUTPATIENT
Start: 2017-09-29 | End: 2017-10-17

## 2017-09-29 RX ORDER — OXYCODONE HYDROCHLORIDE 5 MG/1
5 TABLET ORAL EVERY 4 HOURS PRN
Qty: 10 TABLET | Refills: 0
Start: 2017-09-29 | End: 2017-10-04

## 2017-09-29 RX ADMIN — HYDROCHLOROTHIAZIDE 12.5 MG: 12.5 CAPSULE ORAL at 08:32

## 2017-09-29 RX ADMIN — ESTROGENS, CONJUGATED 0.62 MG: 0.62 TABLET, FILM COATED ORAL at 08:32

## 2017-09-29 RX ADMIN — MEROPENEM 1 G: 1 INJECTION, POWDER, FOR SOLUTION INTRAVENOUS at 06:14

## 2017-09-29 RX ADMIN — VANCOMYCIN HYDROCHLORIDE 1000 MG: 1 INJECTION, SOLUTION INTRAVENOUS at 00:25

## 2017-09-29 RX ADMIN — FAMOTIDINE 20 MG: 20 TABLET ORAL at 08:32

## 2017-09-29 RX ADMIN — LORAZEPAM 0.5 MG: 2 INJECTION INTRAMUSCULAR; INTRAVENOUS at 00:10

## 2017-09-29 RX ADMIN — LISINOPRIL 10 MG: 10 TABLET ORAL at 08:32

## 2017-10-01 LAB
BACTERIA FLD CULT: NORMAL
GRAM STN SPEC: NORMAL

## 2017-10-02 ENCOUNTER — TELEPHONE (OUTPATIENT)
Dept: GYNECOLOGIC ONCOLOGY | Facility: CLINIC | Age: 47
End: 2017-10-02

## 2017-10-02 DIAGNOSIS — R11.0 NAUSEA: ICD-10-CM

## 2017-10-02 DIAGNOSIS — C51.9 VULVAR CANCER, CARCINOMA (HCC): Primary | ICD-10-CM

## 2017-10-02 DIAGNOSIS — N39.0 URINARY TRACT INFECTION WITHOUT HEMATURIA, SITE UNSPECIFIED: ICD-10-CM

## 2017-10-02 DIAGNOSIS — T14.8XXA WOUND INFECTION: ICD-10-CM

## 2017-10-02 DIAGNOSIS — L08.9 WOUND INFECTION: ICD-10-CM

## 2017-10-02 RX ORDER — ONDANSETRON 4 MG/1
4 TABLET, FILM COATED ORAL EVERY 6 HOURS PRN
Qty: 20 TABLET | Refills: 0 | Status: SHIPPED | OUTPATIENT
Start: 2017-10-02 | End: 2017-10-17

## 2017-10-02 RX ORDER — LEVOFLOXACIN 500 MG/1
500 TABLET, FILM COATED ORAL DAILY
Qty: 5 TABLET | Refills: 0 | Status: SHIPPED | OUTPATIENT
Start: 2017-10-02 | End: 2017-10-07

## 2017-10-02 NOTE — TELEPHONE ENCOUNTER
----- Message from Kamini IKER Hodges sent at 10/2/2017 11:35 AM EDT -----  Regarding: alex   post op  Contact: [phone]  Pt called asking for an antibiotic for infection  Pt is also dizzy  Pt is scheduled to see us the 10th  She starts back to work the 9th  And wants to know if she can change per post op appt date.  Please call    I called pt back.  She said she thinks bactrim is making her nauseated.  Per Dr. Copeland levaquin 500 mg x's 5 days sent to pt's pharmacy as well as zofran 4mg q6h prn nausea.  Pt has not been drinking well.  She only had one 6 ounce bottle of water yesterday and no other fluids.  She is having no bleeding.  I advised she try to at least double that amount today and try to go beyond that if possible.  She verbalized understanding and agreed with plan of care.  She needs her appt with Dr. Nassar moved to a later time on 10/10/17.  She was transferred to the front office.

## 2017-10-03 LAB
BACTERIA SPEC AEROBE CULT: ABNORMAL
BACTERIA SPEC AEROBE CULT: ABNORMAL
BACTERIA SPEC ANAEROBE CULT: NORMAL
GRAM STN SPEC: ABNORMAL

## 2017-10-13 ENCOUNTER — TELEPHONE (OUTPATIENT)
Dept: GYNECOLOGIC ONCOLOGY | Facility: CLINIC | Age: 47
End: 2017-10-13

## 2017-10-13 DIAGNOSIS — T14.8XXA WOUND INFECTION: Primary | ICD-10-CM

## 2017-10-13 DIAGNOSIS — L08.9 WOUND INFECTION: Primary | ICD-10-CM

## 2017-10-13 RX ORDER — SULFAMETHOXAZOLE AND TRIMETHOPRIM 800; 160 MG/1; MG/1
TABLET ORAL
Qty: 10 TABLET | Refills: 0 | Status: ON HOLD | OUTPATIENT
Start: 2017-10-13 | End: 2017-10-27

## 2017-10-13 NOTE — TELEPHONE ENCOUNTER
Returned pt's call, states the leg where the infection was gets purple only when she takes a shower, but says it feels sunburned at all times, no redness.  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  She does not have a fever and wants to know if she needs anymore abx. Informed Dr. Copeland, 5 more days of abx sent to patients pharmacy per his order, pt already has appt Tuesday 10-17-17 for f/u with Dr. Nassar, instructed her to call before if fever, swelling to area of concern, or any problems.  Pt v/u, will call if needed.

## 2017-10-17 ENCOUNTER — OFFICE VISIT (OUTPATIENT)
Dept: GYNECOLOGIC ONCOLOGY | Facility: CLINIC | Age: 47
End: 2017-10-17

## 2017-10-17 VITALS
OXYGEN SATURATION: 98 % | RESPIRATION RATE: 14 BRPM | SYSTOLIC BLOOD PRESSURE: 125 MMHG | TEMPERATURE: 97.5 F | WEIGHT: 160 LBS | DIASTOLIC BLOOD PRESSURE: 74 MMHG | BODY MASS INDEX: 30.23 KG/M2 | HEART RATE: 68 BPM

## 2017-10-17 DIAGNOSIS — A46 ERYSIPELAS: ICD-10-CM

## 2017-10-17 DIAGNOSIS — C51.9 VULVAR CANCER, CARCINOMA (HCC): ICD-10-CM

## 2017-10-17 DIAGNOSIS — L90.0 LICHEN SCLEROSUS ET ATROPHICUS: Primary | ICD-10-CM

## 2017-10-17 PROCEDURE — 99024 POSTOP FOLLOW-UP VISIT: CPT | Performed by: OBSTETRICS & GYNECOLOGY

## 2017-10-17 NOTE — PROGRESS NOTES
Edyta Joyce  [id number]  1970      Reason for visit:  Vulvar cancer, hospital follow up for postoperative cellulitis/erysipelas    Consultation:  Patient is being seen at the request of Dr. Abigail Fong for vulvar cancer    History of present illness:  The patient is a 47 y.o. year old female he was discharged from the hospital 2017 for right-sided proximal thigh cellulitis.  She underwent IV antibiotics and is now on oral Bactrim.  She denies fever.  She notes that when she takes a shower the area is discolored and purple.  She is worried that this is a sign of ongoing infection.  Patient denies fever.    She complains of occasional vaginal spotting.  She complains of ongoing symptoms of lichen sclerosus with vaginal irritation which is been a long-standing problem for her.      OBGYN History:  She is a .  She does use HRT including oral Premarin as well as Premarin cream. She does not have a history of abnormal pap smears.      Oncologic History:     Vulvar cancer, carcinoma    2017 Initial Diagnosis     Vulvar cancer, carcinoma       2017 Surgery     MODIFIED RADICAL VULVECTOMY, LEFT INGUINOFEMORAL SENTINAL LYMPH NODE DISSECTION, RIGHT INGUINOFEMORAL LYMPH NODE DISSECTION, MAPPING BIOPSIES OF VULVA  Stage: IB (TIBNOMO) SCC of the Vulva              Past Medical History:   Diagnosis Date   • Acute upper respiratory infection    • Bronchitis    • Cough    • Epilepsy     as a teenager. outgrew them   • Fatigue    • Fever    • Hypercholesteremia    • Hypertension    • Influenza    • Lichen sclerosus of female genitalia    • Ovarian follicular cyst    • Sinusitis    • Sore throat    • UTI (urinary tract infection) 10/2/2017   • Vulva cancer        Past Surgical History:   Procedure Laterality Date   • ANKLE SURGERY     •  SECTION     • HYSTERECTOMY     • LUMBAR LAMINECTOMY  2011    HEMILAMI RT-C7-T1, RT C7-T1 MEDIAL FACET & CT-T1 DISC (Dr. Cabrera   • VULVECTOMY N/A  8/30/2017    Procedure: MODIFIED RADICAL VULVECTOMY, BILATERAL SENTINAL LYMPH NODE DISSECTION, MAPPING BIOPSIES OF VULVA;  Surgeon: Isabela Nassar MD;  Location: [facility];  Service:        MEDICATIONS: The current medication list was reviewed with the patient and updated in the EMR this date per the Medical Assistant. Medication dosages and frequencies were confirmed to be accurate.      Allergies:  is allergic to penicillins.    Social History:   Social History     Social History   • Marital status: Single     Spouse name: N/A   • Number of children: 2   • Years of education: N/A     Occupational History   • Not on file.     Social History Main Topics   • Smoking status: Never Smoker   • Smokeless tobacco: Not on file   • Alcohol use No   • Drug use: No   • Sexual activity: Defer     Other Topics Concern   • Not on file     Social History Narrative       Family History:    Family History   Problem Relation Age of Onset   • Diabetes Mother    • Congenital heart disease Mother    • Hypertension Mother    • Stroke Mother    • Diabetes Father    • Congenital heart disease Father    • Hypertension Father    • Heart attack Other    • Hyperlipidemia Other        Health Maintenance:    Health Maintenance   Topic Date Due   • TDAP/TD VACCINES (1 - Tdap) 07/06/1989   • PAP SMEAR  04/28/2016   • INFLUENZA VACCINE  08/01/2017   • LIPID PANEL  10/06/2017         Review of Systems   Constitutional: Positive for fatigue. Negative for appetite change, chills, fever and unexpected weight change.   HENT: Negative for congestion, hearing loss, sore throat and voice change.    Eyes: Negative for redness and visual disturbance (Corrective lenses).   Respiratory: Negative for cough, shortness of breath and wheezing.    Cardiovascular: Positive for palpitations. Negative for chest pain and leg swelling.   Gastrointestinal: Positive for constipation. Negative for abdominal distention, abdominal pain, blood in stool, diarrhea, nausea  and vomiting.   Endocrine: Negative.  Negative for cold intolerance and heat intolerance.   Genitourinary: Positive for genital sores and vaginal bleeding (spotting). Negative for difficulty urinating, dyspareunia, dysuria, frequency, hematuria, pelvic pain, urgency, vaginal discharge and vaginal pain.        Vaginal irritation    Musculoskeletal: Negative for arthralgias, back pain, gait problem and joint swelling.   Skin: Negative for rash and wound.   Allergic/Immunologic: Negative for food allergies and immunocompromised state.   Neurological: Negative for dizziness, seizures, syncope, weakness, light-headedness, numbness and headaches.   Hematological: Negative for adenopathy. Does not bruise/bleed easily.   Psychiatric/Behavioral: Positive for dysphoric mood. Negative for confusion and sleep disturbance. The patient is nervous/anxious.        Physical Exam    Vitals:    10/17/17 1533   BP: 125/74   Pulse: 68   Resp: 14   Temp: 97.5 °F (36.4 °C)   TempSrc: Temporal Artery    SpO2: 98%   Weight: 160 lb (72.6 kg)     Body mass index is 30.23 kg/(m^2).      GENERAL: Alert, well-appearing female appearing her stated age who is in no apparent distress. Patient is overweight according to BMI.  HEENT: Sclera anicteric. Head normocephalic, atraumatic. Mucus membranes moist.   NECK: Deferred  BREASTS: Deferred  CARDIOVASCULAR: Deferred  RESPIRATORY: Deferred  BACK:  Deferred  GASTROINTESTINAL:  Abdomen is soft, non-tender, non-distended, no rebound or guarding, no masses, or hernias. No HSM.    SKIN:  Warm, dry, well-perfused.  All visible areas intact.  No lesions, ulcers.  Focal hive-like erythematous slightly raised skin lesions at the right proximal thigh and area of previous cellulitis.  No erythema which is compliant and suggestive of persistent cellulitis.  PSYCHIATRIC: AO x3, with appropriate affect, normal thought processes.  NEUROLOGIC: No focal deficits.  Moves extremities well.  MUSCULOSKELETAL: Normal gait  and station.   EXTREMITIES:   No cyanosis, clubbing, symmetric.  LYMPHATICS:  No inguinal adenopathy noted.     PELVIC exam:    GYNECOLOGIC:  External genitalia are remarkable for overall well-healed scar.  There is focal granulation tissue and silver nitrate was applied.  Vaginal introitus was somewhat small.  There was diffuse lichen sclerosis.    ECOG PS 0    PROCEDURES:  none    Diagnostic Data:    No results found for: ]      Assessment/Plan   This is a 47 y.o. woman with Vulvar cancer status post surgical intervention as detailed above.  Status post hospital stay for cellulitis as detailed above.    Patient's overall healing appropriately.  We discussed concern regarding stenosis of the vaginal introitus and that intercourse for vaginal dilators could be employed to further prevent vaginal stenosis.  Patient had questions about her oral and vaginal hormone replacement but Premarin and it was recommended that she use both of these products.  She questions about treatment for lichen sclerosis.  Compounding pharmacy was contacted.  I prefer to use a treatment that does not employ topical steroids at this point due to patient's recent history of HPV related cancer.  It will be a combination of topical anesthetic such as lidocaine and ketamine.  She is to call and let us know how this is working for her.    She is to call with any questions or concerns prior to her follow-up.    FOLLOW UP: Return in about 3 months (around 1/17/2018) for Survivorship with Peri.    Note: Speech recognition transcription software was used to dictate portions of this document.  An attempt at proofreading has been made though minor errors in transcription may still be present.  Please do not hesitate to call our office with any questions.    Electronically Signed by: Isabela Nassar MD  Date: 10/17/2017

## 2017-10-26 ENCOUNTER — APPOINTMENT (OUTPATIENT)
Dept: GENERAL RADIOLOGY | Facility: HOSPITAL | Age: 47
End: 2017-10-26

## 2017-10-26 ENCOUNTER — APPOINTMENT (OUTPATIENT)
Dept: CT IMAGING | Facility: HOSPITAL | Age: 47
End: 2017-10-26

## 2017-10-26 ENCOUNTER — DOCUMENTATION (OUTPATIENT)
Dept: GYNECOLOGIC ONCOLOGY | Facility: CLINIC | Age: 47
End: 2017-10-26

## 2017-10-26 ENCOUNTER — HOSPITAL ENCOUNTER (INPATIENT)
Facility: HOSPITAL | Age: 47
LOS: 2 days | Discharge: HOME | End: 2017-10-28
Attending: EMERGENCY MEDICINE | Admitting: HOSPITALIST

## 2017-10-26 DIAGNOSIS — A41.9 SEPSIS, DUE TO UNSPECIFIED ORGANISM: ICD-10-CM

## 2017-10-26 DIAGNOSIS — L03.314 CELLULITIS OF GROIN, RIGHT: Primary | ICD-10-CM

## 2017-10-26 LAB
ALBUMIN SERPL-MCNC: 4.3 G/DL (ref 3.2–4.8)
ALBUMIN/GLOB SERPL: 1.4 G/DL (ref 1.5–2.5)
ALP SERPL-CCNC: 50 U/L (ref 25–100)
ALT SERPL W P-5'-P-CCNC: 29 U/L (ref 7–40)
ANION GAP SERPL CALCULATED.3IONS-SCNC: 8 MMOL/L (ref 3–11)
AST SERPL-CCNC: 18 U/L (ref 0–33)
BACTERIA UR QL AUTO: ABNORMAL /HPF
BASOPHILS # BLD AUTO: 0.03 10*3/MM3 (ref 0–0.2)
BASOPHILS NFR BLD AUTO: 0.2 % (ref 0–1)
BILIRUB SERPL-MCNC: 0.5 MG/DL (ref 0.3–1.2)
BILIRUB UR QL STRIP: NEGATIVE
BUN BLD-MCNC: 9 MG/DL (ref 9–23)
BUN/CREAT SERPL: 12.9 (ref 7–25)
CALCIUM SPEC-SCNC: 9.3 MG/DL (ref 8.7–10.4)
CHLORIDE SERPL-SCNC: 100 MMOL/L (ref 99–109)
CLARITY UR: CLEAR
CO2 SERPL-SCNC: 28 MMOL/L (ref 20–31)
COLOR UR: YELLOW
CREAT BLD-MCNC: 0.7 MG/DL (ref 0.6–1.3)
D-LACTATE SERPL-SCNC: 2.2 MMOL/L (ref 0.5–2)
DEPRECATED RDW RBC AUTO: 43.8 FL (ref 37–54)
EOSINOPHIL # BLD AUTO: 0.13 10*3/MM3 (ref 0–0.3)
EOSINOPHIL NFR BLD AUTO: 1 % (ref 0–3)
ERYTHROCYTE [DISTWIDTH] IN BLOOD BY AUTOMATED COUNT: 13 % (ref 11.3–14.5)
GFR SERPL CREATININE-BSD FRML MDRD: 90 ML/MIN/1.73
GLOBULIN UR ELPH-MCNC: 3 GM/DL
GLUCOSE BLD-MCNC: 119 MG/DL (ref 70–100)
GLUCOSE UR STRIP-MCNC: NEGATIVE MG/DL
HCT VFR BLD AUTO: 39 % (ref 34.5–44)
HGB BLD-MCNC: 12.7 G/DL (ref 11.5–15.5)
HGB UR QL STRIP.AUTO: NEGATIVE
HOLD SPECIMEN: NORMAL
HOLD SPECIMEN: NORMAL
HYALINE CASTS UR QL AUTO: ABNORMAL /LPF
IMM GRANULOCYTES # BLD: 0.03 10*3/MM3 (ref 0–0.03)
IMM GRANULOCYTES NFR BLD: 0.2 % (ref 0–0.6)
KETONES UR QL STRIP: NEGATIVE
LEUKOCYTE ESTERASE UR QL STRIP.AUTO: ABNORMAL
LYMPHOCYTES # BLD AUTO: 1.35 10*3/MM3 (ref 0.6–4.8)
LYMPHOCYTES NFR BLD AUTO: 10.1 % (ref 24–44)
MCH RBC QN AUTO: 30 PG (ref 27–31)
MCHC RBC AUTO-ENTMCNC: 32.6 G/DL (ref 32–36)
MCV RBC AUTO: 92.2 FL (ref 80–99)
MONOCYTES # BLD AUTO: 0.67 10*3/MM3 (ref 0–1)
MONOCYTES NFR BLD AUTO: 5 % (ref 0–12)
NEUTROPHILS # BLD AUTO: 11.13 10*3/MM3 (ref 1.5–8.3)
NEUTROPHILS NFR BLD AUTO: 83.5 % (ref 41–71)
NITRITE UR QL STRIP: NEGATIVE
PH UR STRIP.AUTO: 7.5 [PH] (ref 5–8)
PLATELET # BLD AUTO: 198 10*3/MM3 (ref 150–450)
PMV BLD AUTO: 9.5 FL (ref 6–12)
POTASSIUM BLD-SCNC: 4 MMOL/L (ref 3.5–5.5)
PROT SERPL-MCNC: 7.3 G/DL (ref 5.7–8.2)
PROT UR QL STRIP: NEGATIVE
RBC # BLD AUTO: 4.23 10*6/MM3 (ref 3.89–5.14)
RBC # UR: ABNORMAL /HPF
REF LAB TEST METHOD: ABNORMAL
SODIUM BLD-SCNC: 136 MMOL/L (ref 132–146)
SP GR UR STRIP: 1.01 (ref 1–1.03)
SQUAMOUS #/AREA URNS HPF: ABNORMAL /HPF
UROBILINOGEN UR QL STRIP: ABNORMAL
WBC NRBC COR # BLD: 13.34 10*3/MM3 (ref 3.5–10.8)
WBC UR QL AUTO: ABNORMAL /HPF
WHOLE BLOOD HOLD SPECIMEN: NORMAL
WHOLE BLOOD HOLD SPECIMEN: NORMAL

## 2017-10-26 PROCEDURE — 99284 EMERGENCY DEPT VISIT MOD MDM: CPT

## 2017-10-26 PROCEDURE — 80053 COMPREHEN METABOLIC PANEL: CPT | Performed by: EMERGENCY MEDICINE

## 2017-10-26 PROCEDURE — 87040 BLOOD CULTURE FOR BACTERIA: CPT | Performed by: EMERGENCY MEDICINE

## 2017-10-26 PROCEDURE — 85025 COMPLETE CBC W/AUTO DIFF WBC: CPT | Performed by: EMERGENCY MEDICINE

## 2017-10-26 PROCEDURE — 99223 1ST HOSP IP/OBS HIGH 75: CPT | Performed by: INTERNAL MEDICINE

## 2017-10-26 PROCEDURE — 83605 ASSAY OF LACTIC ACID: CPT | Performed by: EMERGENCY MEDICINE

## 2017-10-26 PROCEDURE — 81001 URINALYSIS AUTO W/SCOPE: CPT

## 2017-10-26 PROCEDURE — 25010000002 VANCOMYCIN: Performed by: EMERGENCY MEDICINE

## 2017-10-26 PROCEDURE — 74176 CT ABD & PELVIS W/O CONTRAST: CPT

## 2017-10-26 PROCEDURE — 36415 COLL VENOUS BLD VENIPUNCTURE: CPT

## 2017-10-26 PROCEDURE — 87147 CULTURE TYPE IMMUNOLOGIC: CPT

## 2017-10-26 PROCEDURE — 87086 URINE CULTURE/COLONY COUNT: CPT

## 2017-10-26 PROCEDURE — 25010000002 DIPHENHYDRAMINE PER 50 MG: Performed by: EMERGENCY MEDICINE

## 2017-10-26 PROCEDURE — 25010000002 MEROPENEM: Performed by: EMERGENCY MEDICINE

## 2017-10-26 PROCEDURE — 71010 HC CHEST PA OR AP: CPT

## 2017-10-26 RX ORDER — DIPHENHYDRAMINE HYDROCHLORIDE 50 MG/ML
25 INJECTION INTRAMUSCULAR; INTRAVENOUS ONCE
Status: COMPLETED | OUTPATIENT
Start: 2017-10-26 | End: 2017-10-26

## 2017-10-26 RX ORDER — SODIUM CHLORIDE 0.9 % (FLUSH) 0.9 %
10 SYRINGE (ML) INJECTION AS NEEDED
Status: DISCONTINUED | OUTPATIENT
Start: 2017-10-26 | End: 2017-10-28 | Stop reason: HOSPADM

## 2017-10-26 RX ORDER — ACETAMINOPHEN 500 MG
1000 TABLET ORAL ONCE
Status: COMPLETED | OUTPATIENT
Start: 2017-10-26 | End: 2017-10-26

## 2017-10-26 RX ADMIN — DIPHENHYDRAMINE HYDROCHLORIDE 25 MG: 50 INJECTION INTRAMUSCULAR; INTRAVENOUS at 22:35

## 2017-10-26 RX ADMIN — VANCOMYCIN HYDROCHLORIDE 1500 MG: 1 INJECTION, POWDER, LYOPHILIZED, FOR SOLUTION INTRAVENOUS at 22:35

## 2017-10-26 RX ADMIN — SODIUM CHLORIDE 2109 ML: 9 INJECTION, SOLUTION INTRAVENOUS at 21:27

## 2017-10-26 RX ADMIN — ACETAMINOPHEN 1000 MG: 500 TABLET ORAL at 21:22

## 2017-10-26 RX ADMIN — MEROPENEM 1 G: 1 INJECTION, POWDER, FOR SOLUTION INTRAVENOUS at 22:35

## 2017-10-27 LAB
ANION GAP SERPL CALCULATED.3IONS-SCNC: 8 MMOL/L (ref 3–11)
BUN BLD-MCNC: 8 MG/DL (ref 9–23)
BUN/CREAT SERPL: 13.3 (ref 7–25)
CALCIUM SPEC-SCNC: 7.7 MG/DL (ref 8.7–10.4)
CHLORIDE SERPL-SCNC: 104 MMOL/L (ref 99–109)
CO2 SERPL-SCNC: 27 MMOL/L (ref 20–31)
CREAT BLD-MCNC: 0.6 MG/DL (ref 0.6–1.3)
D-LACTATE SERPL-SCNC: 2.8 MMOL/L (ref 0.5–2)
DEPRECATED RDW RBC AUTO: 45.5 FL (ref 37–54)
ERYTHROCYTE [DISTWIDTH] IN BLOOD BY AUTOMATED COUNT: 13.2 % (ref 11.3–14.5)
FLUAV AG NPH QL: NEGATIVE
FLUBV AG NPH QL IA: NEGATIVE
GFR SERPL CREATININE-BSD FRML MDRD: 107 ML/MIN/1.73
GLUCOSE BLD-MCNC: 91 MG/DL (ref 70–100)
HCT VFR BLD AUTO: 34.2 % (ref 34.5–44)
HGB BLD-MCNC: 11.1 G/DL (ref 11.5–15.5)
HOLD SPECIMEN: NORMAL
MCH RBC QN AUTO: 30.6 PG (ref 27–31)
MCHC RBC AUTO-ENTMCNC: 32.5 G/DL (ref 32–36)
MCV RBC AUTO: 94.2 FL (ref 80–99)
PLATELET # BLD AUTO: 143 10*3/MM3 (ref 150–450)
PMV BLD AUTO: 9.2 FL (ref 6–12)
POTASSIUM BLD-SCNC: 3.7 MMOL/L (ref 3.5–5.5)
RBC # BLD AUTO: 3.63 10*6/MM3 (ref 3.89–5.14)
SODIUM BLD-SCNC: 139 MMOL/L (ref 132–146)
WBC NRBC COR # BLD: 8.39 10*3/MM3 (ref 3.5–10.8)

## 2017-10-27 PROCEDURE — 25010000002 MEROPENEM: Performed by: NURSE PRACTITIONER

## 2017-10-27 PROCEDURE — 85027 COMPLETE CBC AUTOMATED: CPT | Performed by: NURSE PRACTITIONER

## 2017-10-27 PROCEDURE — 83605 ASSAY OF LACTIC ACID: CPT | Performed by: EMERGENCY MEDICINE

## 2017-10-27 PROCEDURE — 99232 SBSQ HOSP IP/OBS MODERATE 35: CPT | Performed by: FAMILY MEDICINE

## 2017-10-27 PROCEDURE — 87147 CULTURE TYPE IMMUNOLOGIC: CPT | Performed by: PHYSICIAN ASSISTANT

## 2017-10-27 PROCEDURE — 80048 BASIC METABOLIC PNL TOTAL CA: CPT | Performed by: NURSE PRACTITIONER

## 2017-10-27 PROCEDURE — 87205 SMEAR GRAM STAIN: CPT | Performed by: PHYSICIAN ASSISTANT

## 2017-10-27 PROCEDURE — 87070 CULTURE OTHR SPECIMN AEROBIC: CPT | Performed by: PHYSICIAN ASSISTANT

## 2017-10-27 PROCEDURE — 25010000002 ENOXAPARIN PER 10 MG: Performed by: NURSE PRACTITIONER

## 2017-10-27 PROCEDURE — 87804 INFLUENZA ASSAY W/OPTIC: CPT | Performed by: NURSE PRACTITIONER

## 2017-10-27 PROCEDURE — 25010000003 CEFTRIAXONE PER 250 MG: Performed by: PHYSICIAN ASSISTANT

## 2017-10-27 PROCEDURE — 63710000001 DIPHENHYDRAMINE PER 50 MG: Performed by: NURSE PRACTITIONER

## 2017-10-27 RX ORDER — ACETAMINOPHEN 325 MG/1
650 TABLET ORAL EVERY 4 HOURS PRN
Status: DISCONTINUED | OUTPATIENT
Start: 2017-10-27 | End: 2017-10-28 | Stop reason: HOSPADM

## 2017-10-27 RX ORDER — DIPHENHYDRAMINE HCL 25 MG
25 CAPSULE ORAL EVERY 6 HOURS PRN
Status: DISCONTINUED | OUTPATIENT
Start: 2017-10-27 | End: 2017-10-28 | Stop reason: HOSPADM

## 2017-10-27 RX ORDER — CEFTRIAXONE SODIUM 2 G/50ML
2 INJECTION, SOLUTION INTRAVENOUS EVERY 24 HOURS
Status: DISCONTINUED | OUTPATIENT
Start: 2017-10-27 | End: 2017-10-28 | Stop reason: HOSPADM

## 2017-10-27 RX ORDER — ROSUVASTATIN CALCIUM 20 MG/1
40 TABLET, COATED ORAL NIGHTLY
Status: DISCONTINUED | OUTPATIENT
Start: 2017-10-28 | End: 2017-10-27

## 2017-10-27 RX ORDER — VANCOMYCIN HYDROCHLORIDE 1 G/200ML
15 INJECTION, SOLUTION INTRAVENOUS EVERY 12 HOURS
Status: DISCONTINUED | OUTPATIENT
Start: 2017-10-27 | End: 2017-10-27

## 2017-10-27 RX ORDER — CLINDAMYCIN HYDROCHLORIDE 150 MG/1
300 CAPSULE ORAL EVERY 8 HOURS SCHEDULED
Status: DISCONTINUED | OUTPATIENT
Start: 2017-10-27 | End: 2017-10-28 | Stop reason: HOSPADM

## 2017-10-27 RX ORDER — IBUPROFEN 400 MG/1
400 TABLET ORAL EVERY 4 HOURS PRN
Status: DISCONTINUED | OUTPATIENT
Start: 2017-10-27 | End: 2017-10-28 | Stop reason: HOSPADM

## 2017-10-27 RX ORDER — HYDROCODONE BITARTRATE AND ACETAMINOPHEN 5; 325 MG/1; MG/1
1 TABLET ORAL EVERY 4 HOURS PRN
Status: DISCONTINUED | OUTPATIENT
Start: 2017-10-27 | End: 2017-10-28 | Stop reason: HOSPADM

## 2017-10-27 RX ORDER — SODIUM CHLORIDE 0.9 % (FLUSH) 0.9 %
1-10 SYRINGE (ML) INJECTION AS NEEDED
Status: DISCONTINUED | OUTPATIENT
Start: 2017-10-27 | End: 2017-10-28 | Stop reason: HOSPADM

## 2017-10-27 RX ORDER — ROSUVASTATIN CALCIUM 20 MG/1
40 TABLET, COATED ORAL NIGHTLY
Status: DISCONTINUED | OUTPATIENT
Start: 2017-10-27 | End: 2017-10-28 | Stop reason: HOSPADM

## 2017-10-27 RX ORDER — SODIUM CHLORIDE 9 MG/ML
100 INJECTION, SOLUTION INTRAVENOUS CONTINUOUS
Status: ACTIVE | OUTPATIENT
Start: 2017-10-27 | End: 2017-10-28

## 2017-10-27 RX ADMIN — DIPHENHYDRAMINE HYDROCHLORIDE 25 MG: 25 CAPSULE ORAL at 04:47

## 2017-10-27 RX ADMIN — SODIUM CHLORIDE 100 ML/HR: 9 INJECTION, SOLUTION INTRAVENOUS at 16:56

## 2017-10-27 RX ADMIN — CLINDAMYCIN HYDROCHLORIDE 300 MG: 150 CAPSULE ORAL at 21:01

## 2017-10-27 RX ADMIN — ROSUVASTATIN CALCIUM 40 MG: 20 TABLET, FILM COATED ORAL at 21:00

## 2017-10-27 RX ADMIN — ENOXAPARIN SODIUM 40 MG: 40 INJECTION SUBCUTANEOUS at 08:12

## 2017-10-27 RX ADMIN — HYDROCHLOROTHIAZIDE: 25 TABLET ORAL at 08:12

## 2017-10-27 RX ADMIN — SODIUM CHLORIDE 100 ML/HR: 9 INJECTION, SOLUTION INTRAVENOUS at 08:12

## 2017-10-27 RX ADMIN — IBUPROFEN 400 MG: 400 TABLET ORAL at 01:37

## 2017-10-27 RX ADMIN — CLINDAMYCIN HYDROCHLORIDE 300 MG: 150 CAPSULE ORAL at 13:09

## 2017-10-27 RX ADMIN — DIPHENHYDRAMINE HYDROCHLORIDE 25 MG: 25 CAPSULE ORAL at 20:59

## 2017-10-27 RX ADMIN — HYDROCODONE BITARTRATE AND ACETAMINOPHEN 1 TABLET: 5; 325 TABLET ORAL at 01:37

## 2017-10-27 RX ADMIN — ESTROGENS, CONJUGATED 0.62 MG: 0.62 TABLET, FILM COATED ORAL at 11:11

## 2017-10-27 RX ADMIN — IBUPROFEN 400 MG: 400 TABLET ORAL at 08:12

## 2017-10-27 RX ADMIN — IBUPROFEN 400 MG: 400 TABLET ORAL at 16:56

## 2017-10-27 RX ADMIN — HYDROCODONE BITARTRATE AND ACETAMINOPHEN 1 TABLET: 5; 325 TABLET ORAL at 08:12

## 2017-10-27 RX ADMIN — CEFTRIAXONE SODIUM 2 G: 2 INJECTION, SOLUTION INTRAVENOUS at 12:35

## 2017-10-27 RX ADMIN — DIPHENHYDRAMINE HYDROCHLORIDE 25 MG: 25 CAPSULE ORAL at 12:41

## 2017-10-27 RX ADMIN — MEROPENEM 1 G: 1 INJECTION, POWDER, FOR SOLUTION INTRAVENOUS at 04:47

## 2017-10-27 NOTE — PROGRESS NOTES
Patient called c/o recurrent cellulitis and fever.  Stated she could feel it when she breathes.  I advised her to go to the ER.    I visited the patient in the ER.  She was c/o fever and chills.    I advised her that the hospitalist service would likely admit her.  I will continue to check on her in a social capacity and am very concerned about her recurrent cellulitis.    Isabela Nassar MD  10/26/17  9:25 PM

## 2017-10-28 VITALS
BODY MASS INDEX: 31.53 KG/M2 | WEIGHT: 167 LBS | SYSTOLIC BLOOD PRESSURE: 137 MMHG | OXYGEN SATURATION: 96 % | HEIGHT: 61 IN | TEMPERATURE: 98.4 F | RESPIRATION RATE: 16 BRPM | HEART RATE: 67 BPM | DIASTOLIC BLOOD PRESSURE: 84 MMHG

## 2017-10-28 LAB
ANION GAP SERPL CALCULATED.3IONS-SCNC: 3 MMOL/L (ref 3–11)
BACTERIA SPEC AEROBE CULT: ABNORMAL
BACTERIA SPEC AEROBE CULT: ABNORMAL
BASOPHILS # BLD AUTO: 0.02 10*3/MM3 (ref 0–0.2)
BASOPHILS NFR BLD AUTO: 0.5 % (ref 0–1)
BUN BLD-MCNC: 7 MG/DL (ref 9–23)
BUN/CREAT SERPL: 11.7 (ref 7–25)
CALCIUM SPEC-SCNC: 8 MG/DL (ref 8.7–10.4)
CHLORIDE SERPL-SCNC: 106 MMOL/L (ref 99–109)
CO2 SERPL-SCNC: 27 MMOL/L (ref 20–31)
CREAT BLD-MCNC: 0.6 MG/DL (ref 0.6–1.3)
CRP SERPL-MCNC: 8.5 MG/DL (ref 0–1)
D-LACTATE SERPL-SCNC: 0.6 MMOL/L (ref 0.5–2)
DEPRECATED RDW RBC AUTO: 46.7 FL (ref 37–54)
EOSINOPHIL # BLD AUTO: 0.18 10*3/MM3 (ref 0–0.3)
EOSINOPHIL NFR BLD AUTO: 4.1 % (ref 0–3)
ERYTHROCYTE [DISTWIDTH] IN BLOOD BY AUTOMATED COUNT: 13.4 % (ref 11.3–14.5)
ERYTHROCYTE [SEDIMENTATION RATE] IN BLOOD: 15 MM/HR (ref 0–20)
GFR SERPL CREATININE-BSD FRML MDRD: 107 ML/MIN/1.73
GLUCOSE BLD-MCNC: 97 MG/DL (ref 70–100)
HCT VFR BLD AUTO: 34.7 % (ref 34.5–44)
HGB BLD-MCNC: 11 G/DL (ref 11.5–15.5)
IMM GRANULOCYTES # BLD: 0.01 10*3/MM3 (ref 0–0.03)
IMM GRANULOCYTES NFR BLD: 0.2 % (ref 0–0.6)
LYMPHOCYTES # BLD AUTO: 1.21 10*3/MM3 (ref 0.6–4.8)
LYMPHOCYTES NFR BLD AUTO: 27.9 % (ref 24–44)
MCH RBC QN AUTO: 30.1 PG (ref 27–31)
MCHC RBC AUTO-ENTMCNC: 31.7 G/DL (ref 32–36)
MCV RBC AUTO: 94.8 FL (ref 80–99)
MONOCYTES # BLD AUTO: 0.3 10*3/MM3 (ref 0–1)
MONOCYTES NFR BLD AUTO: 6.9 % (ref 0–12)
NEUTROPHILS # BLD AUTO: 2.62 10*3/MM3 (ref 1.5–8.3)
NEUTROPHILS NFR BLD AUTO: 60.4 % (ref 41–71)
PLATELET # BLD AUTO: 146 10*3/MM3 (ref 150–450)
PMV BLD AUTO: 9.2 FL (ref 6–12)
POTASSIUM BLD-SCNC: 3.7 MMOL/L (ref 3.5–5.5)
RBC # BLD AUTO: 3.66 10*6/MM3 (ref 3.89–5.14)
SODIUM BLD-SCNC: 136 MMOL/L (ref 132–146)
STREP GROUPING: ABNORMAL
WBC NRBC COR # BLD: 4.34 10*3/MM3 (ref 3.5–10.8)

## 2017-10-28 PROCEDURE — 85025 COMPLETE CBC W/AUTO DIFF WBC: CPT | Performed by: PHYSICIAN ASSISTANT

## 2017-10-28 PROCEDURE — 99239 HOSP IP/OBS DSCHRG MGMT >30: CPT | Performed by: HOSPITALIST

## 2017-10-28 PROCEDURE — 25010000003 CEFTRIAXONE PER 250 MG: Performed by: PHYSICIAN ASSISTANT

## 2017-10-28 PROCEDURE — 25010000002 ENOXAPARIN PER 10 MG: Performed by: NURSE PRACTITIONER

## 2017-10-28 PROCEDURE — 80048 BASIC METABOLIC PNL TOTAL CA: CPT | Performed by: PHYSICIAN ASSISTANT

## 2017-10-28 PROCEDURE — 83605 ASSAY OF LACTIC ACID: CPT | Performed by: PHYSICIAN ASSISTANT

## 2017-10-28 PROCEDURE — 86140 C-REACTIVE PROTEIN: CPT | Performed by: PHYSICIAN ASSISTANT

## 2017-10-28 PROCEDURE — 63710000001 DIPHENHYDRAMINE PER 50 MG: Performed by: NURSE PRACTITIONER

## 2017-10-28 PROCEDURE — 85652 RBC SED RATE AUTOMATED: CPT | Performed by: PHYSICIAN ASSISTANT

## 2017-10-28 RX ORDER — HYDROCODONE BITARTRATE AND ACETAMINOPHEN 5; 325 MG/1; MG/1
1 TABLET ORAL EVERY 4 HOURS PRN
Qty: 16 TABLET | Refills: 0 | Status: SHIPPED | OUTPATIENT
Start: 2017-10-28 | End: 2017-11-06

## 2017-10-28 RX ORDER — CLINDAMYCIN HYDROCHLORIDE 300 MG/1
300 CAPSULE ORAL EVERY 8 HOURS SCHEDULED
Qty: 3 CAPSULE | Refills: 0 | Status: SHIPPED | OUTPATIENT
Start: 2017-10-28 | End: 2017-10-29

## 2017-10-28 RX ORDER — CEFTRIAXONE 1 G/1
2 INJECTION, POWDER, FOR SOLUTION INTRAMUSCULAR; INTRAVENOUS EVERY 24 HOURS
Qty: 1 VIAL | Refills: 0 | Status: SHIPPED | OUTPATIENT
Start: 2017-10-28 | End: 2018-01-23

## 2017-10-28 RX ADMIN — ENOXAPARIN SODIUM 40 MG: 40 INJECTION SUBCUTANEOUS at 08:39

## 2017-10-28 RX ADMIN — HYDROCODONE BITARTRATE AND ACETAMINOPHEN 1 TABLET: 5; 325 TABLET ORAL at 08:39

## 2017-10-28 RX ADMIN — DIPHENHYDRAMINE HYDROCHLORIDE 25 MG: 25 CAPSULE ORAL at 12:40

## 2017-10-28 RX ADMIN — CLINDAMYCIN HYDROCHLORIDE 300 MG: 150 CAPSULE ORAL at 05:42

## 2017-10-28 RX ADMIN — DIPHENHYDRAMINE HYDROCHLORIDE 25 MG: 25 CAPSULE ORAL at 05:42

## 2017-10-28 RX ADMIN — CEFTRIAXONE SODIUM 2 G: 2 INJECTION, SOLUTION INTRAVENOUS at 11:45

## 2017-10-28 RX ADMIN — HYDROCHLOROTHIAZIDE: 25 TABLET ORAL at 08:39

## 2017-10-28 RX ADMIN — SODIUM CHLORIDE 100 ML/HR: 9 INJECTION, SOLUTION INTRAVENOUS at 03:25

## 2017-10-28 RX ADMIN — ESTROGENS, CONJUGATED 0.62 MG: 0.62 TABLET, FILM COATED ORAL at 08:39

## 2017-10-28 RX ADMIN — IBUPROFEN 400 MG: 400 TABLET ORAL at 08:39

## 2017-10-29 LAB
BACTERIA SPEC AEROBE CULT: ABNORMAL
GRAM STN SPEC: ABNORMAL
GRAM STN SPEC: ABNORMAL
STREP GROUPING: ABNORMAL

## 2017-10-30 ENCOUNTER — TRANSCRIBE ORDERS (OUTPATIENT)
Dept: LAB | Facility: HOSPITAL | Age: 47
End: 2017-10-30

## 2017-10-30 ENCOUNTER — TRANSCRIBE ORDERS (OUTPATIENT)
Dept: ADMINISTRATIVE | Facility: HOSPITAL | Age: 47
End: 2017-10-30

## 2017-10-30 ENCOUNTER — CLINICAL SUPPORT (OUTPATIENT)
Dept: INFUSION THERAPY | Facility: HOSPITAL | Age: 47
End: 2017-10-30
Attending: INTERNAL MEDICINE

## 2017-10-30 ENCOUNTER — LAB (OUTPATIENT)
Dept: LAB | Facility: HOSPITAL | Age: 47
End: 2017-10-30

## 2017-10-30 VITALS
HEIGHT: 61 IN | HEART RATE: 60 BPM | RESPIRATION RATE: 18 BRPM | WEIGHT: 163 LBS | SYSTOLIC BLOOD PRESSURE: 151 MMHG | BODY MASS INDEX: 30.78 KG/M2 | DIASTOLIC BLOOD PRESSURE: 94 MMHG | OXYGEN SATURATION: 96 % | TEMPERATURE: 98 F

## 2017-10-30 DIAGNOSIS — L03.314 CELLULITIS OF GROIN: ICD-10-CM

## 2017-10-30 DIAGNOSIS — L03.314 CELLULITIS OF GROIN: Primary | ICD-10-CM

## 2017-10-30 DIAGNOSIS — M79.605 LEFT LEG PAIN: ICD-10-CM

## 2017-10-30 DIAGNOSIS — D69.49 OTHER PRIMARY THROMBOCYTOPENIA (HCC): ICD-10-CM

## 2017-10-30 DIAGNOSIS — IMO0001 WOUND, SURGICAL, INFECTED, SUBSEQUENT ENCOUNTER: ICD-10-CM

## 2017-10-30 DIAGNOSIS — IMO0001 WOUND, SURGICAL, INFECTED, SUBSEQUENT ENCOUNTER: Primary | ICD-10-CM

## 2017-10-30 DIAGNOSIS — M79.89 LEG SWELLING: ICD-10-CM

## 2017-10-30 LAB
ALBUMIN SERPL-MCNC: 4.2 G/DL (ref 3.2–4.8)
ALBUMIN/GLOB SERPL: 1.6 G/DL (ref 1.5–2.5)
ALP SERPL-CCNC: 50 U/L (ref 25–100)
ALT SERPL W P-5'-P-CCNC: 33 U/L (ref 7–40)
ANION GAP SERPL CALCULATED.3IONS-SCNC: 6 MMOL/L (ref 3–11)
AST SERPL-CCNC: 23 U/L (ref 0–33)
BASOPHILS # BLD AUTO: 0.04 10*3/MM3 (ref 0–0.2)
BASOPHILS NFR BLD AUTO: 0.7 % (ref 0–1)
BILIRUB SERPL-MCNC: 0.3 MG/DL (ref 0.3–1.2)
BUN BLD-MCNC: 9 MG/DL (ref 9–23)
BUN/CREAT SERPL: 12.9 (ref 7–25)
CALCIUM SPEC-SCNC: 9.1 MG/DL (ref 8.7–10.4)
CHLORIDE SERPL-SCNC: 103 MMOL/L (ref 99–109)
CO2 SERPL-SCNC: 30 MMOL/L (ref 20–31)
CREAT BLD-MCNC: 0.7 MG/DL (ref 0.6–1.3)
CRP SERPL-MCNC: 2.12 MG/DL (ref 0–1)
DEPRECATED RDW RBC AUTO: 43.4 FL (ref 37–54)
EOSINOPHIL # BLD AUTO: 0.23 10*3/MM3 (ref 0–0.3)
EOSINOPHIL NFR BLD AUTO: 4 % (ref 0–3)
ERYTHROCYTE [DISTWIDTH] IN BLOOD BY AUTOMATED COUNT: 12.9 % (ref 11.3–14.5)
ERYTHROCYTE [SEDIMENTATION RATE] IN BLOOD: 28 MM/HR (ref 0–20)
GFR SERPL CREATININE-BSD FRML MDRD: 90 ML/MIN/1.73
GLOBULIN UR ELPH-MCNC: 2.7 GM/DL
GLUCOSE BLD-MCNC: 145 MG/DL (ref 70–100)
HCT VFR BLD AUTO: 39 % (ref 34.5–44)
HGB BLD-MCNC: 12.9 G/DL (ref 11.5–15.5)
IMM GRANULOCYTES # BLD: 0.02 10*3/MM3 (ref 0–0.03)
IMM GRANULOCYTES NFR BLD: 0.3 % (ref 0–0.6)
LYMPHOCYTES # BLD AUTO: 1.43 10*3/MM3 (ref 0.6–4.8)
LYMPHOCYTES NFR BLD AUTO: 24.8 % (ref 24–44)
MCH RBC QN AUTO: 30.4 PG (ref 27–31)
MCHC RBC AUTO-ENTMCNC: 33.1 G/DL (ref 32–36)
MCV RBC AUTO: 91.8 FL (ref 80–99)
MONOCYTES # BLD AUTO: 0.31 10*3/MM3 (ref 0–1)
MONOCYTES NFR BLD AUTO: 5.4 % (ref 0–12)
NEUTROPHILS # BLD AUTO: 3.74 10*3/MM3 (ref 1.5–8.3)
NEUTROPHILS NFR BLD AUTO: 64.8 % (ref 41–71)
PLATELET # BLD AUTO: 215 10*3/MM3 (ref 150–450)
PMV BLD AUTO: 9.2 FL (ref 6–12)
POTASSIUM BLD-SCNC: 4 MMOL/L (ref 3.5–5.5)
PROT SERPL-MCNC: 6.9 G/DL (ref 5.7–8.2)
RBC # BLD AUTO: 4.25 10*6/MM3 (ref 3.89–5.14)
SODIUM BLD-SCNC: 139 MMOL/L (ref 132–146)
WBC NRBC COR # BLD: 5.77 10*3/MM3 (ref 3.5–10.8)

## 2017-10-30 PROCEDURE — 85025 COMPLETE CBC W/AUTO DIFF WBC: CPT | Performed by: INTERNAL MEDICINE

## 2017-10-30 PROCEDURE — 85652 RBC SED RATE AUTOMATED: CPT | Performed by: INTERNAL MEDICINE

## 2017-10-30 PROCEDURE — 36415 COLL VENOUS BLD VENIPUNCTURE: CPT | Performed by: INTERNAL MEDICINE

## 2017-10-30 PROCEDURE — C1894 INTRO/SHEATH, NON-LASER: HCPCS

## 2017-10-30 PROCEDURE — C1751 CATH, INF, PER/CENT/MIDLINE: HCPCS

## 2017-10-30 PROCEDURE — 86140 C-REACTIVE PROTEIN: CPT | Performed by: INTERNAL MEDICINE

## 2017-10-30 PROCEDURE — 80053 COMPREHEN METABOLIC PANEL: CPT | Performed by: INTERNAL MEDICINE

## 2017-10-30 RX ORDER — SODIUM CHLORIDE 0.9 % (FLUSH) 0.9 %
10 SYRINGE (ML) INJECTION AS NEEDED
Status: DISCONTINUED | OUTPATIENT
Start: 2017-10-30 | End: 2017-10-30 | Stop reason: HOSPADM

## 2017-10-31 ENCOUNTER — HOSPITAL ENCOUNTER (OUTPATIENT)
Dept: CARDIOLOGY | Facility: HOSPITAL | Age: 47
Discharge: HOME | End: 2017-10-31
Attending: INTERNAL MEDICINE | Admitting: INTERNAL MEDICINE

## 2017-10-31 DIAGNOSIS — M79.605 LEFT LEG PAIN: ICD-10-CM

## 2017-10-31 DIAGNOSIS — M79.89 LEG SWELLING: ICD-10-CM

## 2017-10-31 LAB
BACTERIA SPEC AEROBE CULT: NORMAL
BACTERIA SPEC AEROBE CULT: NORMAL
BH CV LOWER VASCULAR LEFT COMMON FEMORAL AUGMENT: NORMAL
BH CV LOWER VASCULAR LEFT COMMON FEMORAL COMPETENT: NORMAL
BH CV LOWER VASCULAR LEFT COMMON FEMORAL COMPRESS: NORMAL
BH CV LOWER VASCULAR LEFT COMMON FEMORAL PHASIC: NORMAL
BH CV LOWER VASCULAR LEFT COMMON FEMORAL SPONT: NORMAL
BH CV LOWER VASCULAR LEFT DISTAL FEMORAL COMPRESS: NORMAL
BH CV LOWER VASCULAR LEFT GASTRONEMIUS COMPRESS: NORMAL
BH CV LOWER VASCULAR LEFT GREATER SAPH AK COMPRESS: NORMAL
BH CV LOWER VASCULAR LEFT LESSER SAPH COMPRESS: NORMAL
BH CV LOWER VASCULAR LEFT MID FEMORAL AUGMENT: NORMAL
BH CV LOWER VASCULAR LEFT MID FEMORAL COMPETENT: NORMAL
BH CV LOWER VASCULAR LEFT MID FEMORAL COMPRESS: NORMAL
BH CV LOWER VASCULAR LEFT MID FEMORAL PHASIC: NORMAL
BH CV LOWER VASCULAR LEFT MID FEMORAL SPONT: NORMAL
BH CV LOWER VASCULAR LEFT PERONEAL COMPRESS: NORMAL
BH CV LOWER VASCULAR LEFT POPLITEAL AUGMENT: NORMAL
BH CV LOWER VASCULAR LEFT POPLITEAL COMPETENT: NORMAL
BH CV LOWER VASCULAR LEFT POPLITEAL COMPRESS: NORMAL
BH CV LOWER VASCULAR LEFT POPLITEAL PHASIC: NORMAL
BH CV LOWER VASCULAR LEFT POPLITEAL SPONT: NORMAL
BH CV LOWER VASCULAR LEFT POSTERIOR TIBIAL COMPRESS: NORMAL
BH CV LOWER VASCULAR LEFT PROXIMAL FEMORAL COMPRESS: NORMAL
BH CV LOWER VASCULAR LEFT SAPHENOFEMORAL JUNCTION AUGMENT: NORMAL
BH CV LOWER VASCULAR LEFT SAPHENOFEMORAL JUNCTION COMPETENT: NORMAL
BH CV LOWER VASCULAR LEFT SAPHENOFEMORAL JUNCTION COMPRESS: NORMAL
BH CV LOWER VASCULAR LEFT SAPHENOFEMORAL JUNCTION PHASIC: NORMAL
BH CV LOWER VASCULAR LEFT SAPHENOFEMORAL JUNCTION SPONT: NORMAL
BH CV LOWER VASCULAR RIGHT COMMON FEMORAL AUGMENT: NORMAL
BH CV LOWER VASCULAR RIGHT COMMON FEMORAL COMPETENT: NORMAL
BH CV LOWER VASCULAR RIGHT COMMON FEMORAL COMPRESS: NORMAL
BH CV LOWER VASCULAR RIGHT COMMON FEMORAL PHASIC: NORMAL
BH CV LOWER VASCULAR RIGHT COMMON FEMORAL SPONT: NORMAL

## 2017-10-31 PROCEDURE — 93971 EXTREMITY STUDY: CPT | Performed by: INTERNAL MEDICINE

## 2017-10-31 PROCEDURE — 93971 EXTREMITY STUDY: CPT

## 2017-11-01 ENCOUNTER — TELEPHONE (OUTPATIENT)
Dept: FAMILY MEDICINE CLINIC | Facility: CLINIC | Age: 47
End: 2017-11-01

## 2017-11-01 RX ORDER — LISINOPRIL AND HYDROCHLOROTHIAZIDE 12.5; 1 MG/1; MG/1
1 TABLET ORAL DAILY
Qty: 30 TABLET | Refills: 5 | Status: SHIPPED | OUTPATIENT
Start: 2017-11-01 | End: 2017-11-22 | Stop reason: SDUPTHER

## 2017-11-01 NOTE — TELEPHONE ENCOUNTER
Let her know I rf'd the med on file so please check and make sure it is what she was wanting. I gave her 5 rf's till she can get in.

## 2017-11-01 NOTE — TELEPHONE ENCOUNTER
PT CALLED AND STATES SHE HAD SEEN DR RODRIGUEZ AND STATES SHE IS TRYING TO GET ESTABLISHED WITH DR INTERIANO HOWEVER HAS BEEN BATTLING WITH BREAST CANCER AND NEEDS A 330 OR LATER. SHE HAS NOT GOTTEN ANYTHING CURRENTLY SCHEDULED DUE TO HER ISSUES WITH BREAST CANCER AND STATES SHE IS COMPLETELY OUT OF HER BLOOD PRESSURE MEDICATION LISINOPRIL. 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

## 2017-11-05 ENCOUNTER — TRANSCRIBE ORDERS (OUTPATIENT)
Dept: LAB | Facility: HOSPITAL | Age: 47
End: 2017-11-05

## 2017-11-05 DIAGNOSIS — R60.9 EDEMA, UNSPECIFIED TYPE: ICD-10-CM

## 2017-11-05 DIAGNOSIS — M79.601 RIGHT UPPER LIMB PAIN: ICD-10-CM

## 2017-11-05 DIAGNOSIS — M79.89 SWELLING OF LIMB: Primary | ICD-10-CM

## 2017-11-06 ENCOUNTER — HOSPITAL ENCOUNTER (OUTPATIENT)
Dept: CARDIOLOGY | Facility: HOSPITAL | Age: 47
Discharge: HOME | End: 2017-11-06
Attending: INTERNAL MEDICINE | Admitting: INTERNAL MEDICINE

## 2017-11-06 DIAGNOSIS — R60.9 EDEMA, UNSPECIFIED TYPE: ICD-10-CM

## 2017-11-06 LAB
BH CV ECHO MEAS - BSA(HAYCOCK): 1.8 M^2
BH CV ECHO MEAS - BSA: 1.7 M^2
BH CV ECHO MEAS - BZI_BMI: 30.8 KILOGRAMS/M^2
BH CV ECHO MEAS - BZI_METRIC_HEIGHT: 154.9 CM
BH CV ECHO MEAS - BZI_METRIC_WEIGHT: 73.9 KG
BH CV UPPER VENOUS LEFT SUBCLAVIAN AUGMENT: NORMAL
BH CV UPPER VENOUS LEFT SUBCLAVIAN COMPRESS: NORMAL
BH CV UPPER VENOUS LEFT SUBCLAVIAN PHASIC: NORMAL
BH CV UPPER VENOUS LEFT SUBCLAVIAN SPONT: NORMAL
BH CV UPPER VENOUS RIGHT AXILLARY AUGMENT: NORMAL
BH CV UPPER VENOUS RIGHT AXILLARY COMPRESS: NORMAL
BH CV UPPER VENOUS RIGHT AXILLARY PHASIC: NORMAL
BH CV UPPER VENOUS RIGHT AXILLARY SPONT: NORMAL
BH CV UPPER VENOUS RIGHT BASILIC UPPER COMPRESS: NORMAL
BH CV UPPER VENOUS RIGHT BRACHIAL COMPRESS: NORMAL
BH CV UPPER VENOUS RIGHT CEPHALIC UPPER COMPRESS: NORMAL
BH CV UPPER VENOUS RIGHT INTERNAL JUGULAR AUGMENT: NORMAL
BH CV UPPER VENOUS RIGHT INTERNAL JUGULAR COMPRESS: NORMAL
BH CV UPPER VENOUS RIGHT INTERNAL JUGULAR PHASIC: NORMAL
BH CV UPPER VENOUS RIGHT INTERNAL JUGULAR SPONT: NORMAL
BH CV UPPER VENOUS RIGHT SUBCLAVIAN AUGMENT: NORMAL
BH CV UPPER VENOUS RIGHT SUBCLAVIAN COMPRESS: NORMAL
BH CV UPPER VENOUS RIGHT SUBCLAVIAN PHASIC: NORMAL
BH CV UPPER VENOUS RIGHT SUBCLAVIAN SPONT: NORMAL

## 2017-11-06 PROCEDURE — 93971 EXTREMITY STUDY: CPT

## 2017-11-06 PROCEDURE — 93971 EXTREMITY STUDY: CPT | Performed by: INTERNAL MEDICINE

## 2017-11-22 ENCOUNTER — OFFICE VISIT (OUTPATIENT)
Dept: FAMILY MEDICINE CLINIC | Facility: CLINIC | Age: 47
End: 2017-11-22

## 2017-11-22 VITALS
OXYGEN SATURATION: 98 % | HEART RATE: 87 BPM | WEIGHT: 163.12 LBS | TEMPERATURE: 98.3 F | SYSTOLIC BLOOD PRESSURE: 128 MMHG | BODY MASS INDEX: 30.8 KG/M2 | RESPIRATION RATE: 16 BRPM | DIASTOLIC BLOOD PRESSURE: 85 MMHG | HEIGHT: 61 IN

## 2017-11-22 DIAGNOSIS — I10 HYPERTENSION, ESSENTIAL, BENIGN: Primary | ICD-10-CM

## 2017-11-22 DIAGNOSIS — R73.9 ELEVATED BLOOD SUGAR: ICD-10-CM

## 2017-11-22 DIAGNOSIS — R53.83 OTHER FATIGUE: ICD-10-CM

## 2017-11-22 DIAGNOSIS — E78.00 HYPERCHOLESTEREMIA: ICD-10-CM

## 2017-11-22 DIAGNOSIS — E78.01 FAMILIAL HYPERCHOLESTEROLEMIA: ICD-10-CM

## 2017-11-22 PROCEDURE — 99214 OFFICE O/P EST MOD 30 MIN: CPT | Performed by: INTERNAL MEDICINE

## 2017-11-22 RX ORDER — ROSUVASTATIN CALCIUM 40 MG/1
40 TABLET, COATED ORAL NIGHTLY
Qty: 90 TABLET | Refills: 3 | Status: SHIPPED | OUTPATIENT
Start: 2017-11-22 | End: 2018-12-22 | Stop reason: SDUPTHER

## 2017-11-22 RX ORDER — LISINOPRIL AND HYDROCHLOROTHIAZIDE 12.5; 1 MG/1; MG/1
1 TABLET ORAL DAILY
Qty: 90 TABLET | Refills: 3 | Status: SHIPPED | OUTPATIENT
Start: 2017-11-22 | End: 2018-11-30 | Stop reason: SDUPTHER

## 2017-11-22 RX ORDER — CEPHALEXIN 500 MG/1
CAPSULE ORAL
COMMUNITY
Start: 2017-11-06 | End: 2018-02-08

## 2017-11-22 RX ORDER — EZETIMIBE 10 MG/1
10 TABLET ORAL DAILY
Qty: 90 TABLET | Refills: 3 | Status: SHIPPED | OUTPATIENT
Start: 2017-11-22 | End: 2019-04-18 | Stop reason: SDUPTHER

## 2017-11-22 NOTE — PROGRESS NOTES
Subjective     Patient ID: Edyta Joyce is a 47 y.o. female. Patient is here for management of multiple medical problems.     Chief Complaint   Patient presents with   • Establish Care   • Hyperlipidemia   • Hypertension     Hyperlipidemia   This is a chronic problem. The current episode started more than 1 year ago. She has no history of chronic renal disease. There are no known factors aggravating her hyperlipidemia. Pertinent negatives include no chest pain, focal sensory loss or shortness of breath. She is currently on no antihyperlipidemic treatment. The current treatment provides moderate improvement of lipids. There are no compliance problems.    Hypertension   This is a chronic problem. The current episode started more than 1 year ago. The problem is controlled. Pertinent negatives include no anxiety, chest pain or shortness of breath. There are no known risk factors for coronary artery disease. Past treatments include diuretics and ACE inhibitors. There is no history of angina, kidney disease or CAD/MI. There is no history of chronic renal disease.        Pt has a picc line and changing to oral.        The following portions of the patient's history were reviewed and updated as appropriate: allergies, current medications, past family history, past medical history, past social history, past surgical history and problem list.    Review of Systems   Constitutional: Positive for fatigue.   Respiratory: Negative for cough and shortness of breath.    Cardiovascular: Negative for chest pain.   Neurological: Negative for weakness.   Psychiatric/Behavioral: Negative for sleep disturbance. The patient is not nervous/anxious.    All other systems reviewed and are negative.      Current Outpatient Prescriptions:   •  cephalexin (KEFLEX) 500 MG capsule, , Disp: , Rfl:   •  estrogens, conjugated, (PREMARIN) 0.625 MG tablet, Take 1 tablet by mouth Daily., Disp: 30 tablet, Rfl: 0  •  ezetimibe (ZETIA) 10 MG tablet,  "Take 1 tablet by mouth Daily., Disp: 90 tablet, Rfl: 3  •  lisinopril-hydrochlorothiazide (PRINZIDE,ZESTORETIC) 10-12.5 MG per tablet, Take 1 tablet by mouth Daily., Disp: 90 tablet, Rfl: 3  •  rosuvastatin (CRESTOR) 40 MG tablet, Take 1 tablet by mouth Every Night., Disp: 90 tablet, Rfl: 3  •  cefTRIAXone (ROCEPHIN) 1 g injection, Infuse 2 g into a venous catheter Daily., Disp: 1 vial, Rfl: 0    Objective      Blood pressure 128/85, pulse 87, temperature 98.3 °F (36.8 °C), temperature source Oral, resp. rate 16, height 61\" (154.9 cm), weight 163 lb 1.9 oz (74 kg), SpO2 98 %.    Physical Exam     General Appearance:    Alert, cooperative, no distress, appears stated age   Head:    Normocephalic, without obvious abnormality, atraumatic   Eyes:    PERRL, conjunctiva/corneas clear, EOM's intact   Ears:    Normal TM's and external ear canals, both ears   Nose:   Nares normal, septum midline, mucosa normal, no drainage   or sinus tenderness   Throat:   Lips, mucosa, and tongue normal; teeth and gums normal   Neck:   Supple, symmetrical, trachea midline, no adenopathy;        thyroid:  No enlargement/tenderness/nodules; no carotid    bruit or JVD   Back:     Symmetric, no curvature, ROM normal, no CVA tenderness   Lungs:     Clear to auscultation bilaterally, respirations unlabored   Chest wall:    No tenderness or deformity   Heart:    Regular rate and rhythm, S1 and S2 normal, no murmur,        rub or gallop   Abdomen:     Soft, non-tender, bowel sounds active all four quadrants,     no masses, no organomegaly   Extremities:   Extremities normal, atraumatic, no cyanosis or edema   Pulses:   2+ and symmetric all extremities   Skin:   Skin color, texture, turgor normal, no rashes or lesions   Lymph nodes:   Cervical, supraclavicular, and axillary nodes normal   Neurologic:   CNII-XII intact. Normal strength, sensation and reflexes       throughout      Results for orders placed or performed during the hospital encounter " of 11/06/17   Duplex venous upper extremity right CAR   Result Value Ref Range    BSA 1.7 m^2     CV ECHO ISABELLE - BZI_BMI 30.8 kilograms/m^2     CV ECHO ISABELLE - BSA(H[place]COCK) 1.8 m^2     CV ECHO ISABELLE - BZI_METRIC_WEIGHT 73.9 kg     CV ECHO ISABELLE - BZI_METRIC_HEIGHT 154.9 cm    Right Internal Jugular Augment Y     Right Internal Jugular Compress C     Right Internal Jugular Phasic Y     Right Internal Jugular Spont Y     Right Subclavian Augment Y     Right Subclavian Compress C     Right Subclavian Phasic Y     Right Subclavian Spont Y     Left Subclavian Augment Y     Left Subclavian Compress C     Left Subclavian Phasic Y     Left Subclavian Spont Y     Right Axillary Augment Y     Right Axillary Compress C     Right Axillary Phasic Y     Right Axillary Spont Y     Right Brachial Compress C     Right Basilic Upper Compress C     Right Cephalic Upper Compress C          Assessment/Plan       Edyta was seen today for establish care, hyperlipidemia and hypertension.    Diagnoses and all orders for this visit:    Hypertension, essential, benign  -     TSH  -     T4, Free  -     Vitamin B12  -     Comprehensive Metabolic Panel  -     CBC & Differential  -     Lipid Panel  -     Hemoglobin A1c    Other fatigue  -     TSH  -     T4, Free  -     Vitamin B12  -     Comprehensive Metabolic Panel  -     CBC & Differential  -     Lipid Panel  -     Hemoglobin A1c    Hypercholesteremia  -     TSH  -     T4, Free  -     Vitamin B12  -     Comprehensive Metabolic Panel  -     CBC & Differential  -     Lipid Panel  -     Hemoglobin A1c    Elevated blood sugar  -     Hemoglobin A1c    Familial hypercholesterolemia  -     rosuvastatin (CRESTOR) 40 MG tablet; Take 1 tablet by mouth Every Night.  -     ezetimibe (ZETIA) 10 MG tablet; Take 1 tablet by mouth Daily.    Other orders  -     lisinopril-hydrochlorothiazide (PRINZIDE,ZESTORETIC) 10-12.5 MG per tablet; Take 1 tablet by mouth Daily.      Return in about 3 months (around  2/22/2018).          There are no Patient Instructions on file for this visit.     Elias Zavaleta MD    Assessment/Plan

## 2017-12-05 ENCOUNTER — LAB (OUTPATIENT)
Dept: LAB | Facility: HOSPITAL | Age: 47
End: 2017-12-05

## 2017-12-05 ENCOUNTER — TRANSCRIBE ORDERS (OUTPATIENT)
Dept: LAB | Facility: HOSPITAL | Age: 47
End: 2017-12-05

## 2017-12-05 DIAGNOSIS — C51.9 CARCINOMA OF VULVA (HCC): ICD-10-CM

## 2017-12-05 DIAGNOSIS — J02.0 GBBS (GROUP B BETA HEMOLYTIC STREPTOCOCCUS) PHARYNGITIS: ICD-10-CM

## 2017-12-05 DIAGNOSIS — D69.59 THROMBOCYTOPENIA DUE TO DIMINISHED PLATELET PRODUCTION: ICD-10-CM

## 2017-12-05 DIAGNOSIS — T82.848A PAIN FROM ARTERIOVENOUS FISTULA (HCC): ICD-10-CM

## 2017-12-05 DIAGNOSIS — L03.314 CELLULITIS OF GROIN: ICD-10-CM

## 2017-12-05 DIAGNOSIS — B95.1 GBBS (GROUP B BETA HEMOLYTIC STREPTOCOCCUS) PHARYNGITIS: ICD-10-CM

## 2017-12-05 DIAGNOSIS — D72.823 NEUTROPHILIC LEUKEMOID REACTION: ICD-10-CM

## 2017-12-05 DIAGNOSIS — IMO0001 WOUND, SURGICAL, INFECTED, SUBSEQUENT ENCOUNTER: ICD-10-CM

## 2017-12-05 DIAGNOSIS — IMO0001 WOUND, SURGICAL, INFECTED, SUBSEQUENT ENCOUNTER: Primary | ICD-10-CM

## 2017-12-05 LAB
BASOPHILS # BLD AUTO: 0.03 10*3/MM3 (ref 0–0.2)
BASOPHILS NFR BLD AUTO: 0.4 % (ref 0–1)
CRP SERPL-MCNC: 1.86 MG/DL (ref 0–1)
DEPRECATED RDW RBC AUTO: 42.7 FL (ref 37–54)
EOSINOPHIL # BLD AUTO: 0.2 10*3/MM3 (ref 0–0.3)
EOSINOPHIL NFR BLD AUTO: 2.8 % (ref 0–3)
ERYTHROCYTE [DISTWIDTH] IN BLOOD BY AUTOMATED COUNT: 12.9 % (ref 11.3–14.5)
ERYTHROCYTE [SEDIMENTATION RATE] IN BLOOD: 20 MM/HR (ref 0–20)
HCT VFR BLD AUTO: 38.9 % (ref 34.5–44)
HGB BLD-MCNC: 12.9 G/DL (ref 11.5–15.5)
IMM GRANULOCYTES # BLD: 0.01 10*3/MM3 (ref 0–0.03)
IMM GRANULOCYTES NFR BLD: 0.1 % (ref 0–0.6)
LYMPHOCYTES # BLD AUTO: 2 10*3/MM3 (ref 0.6–4.8)
LYMPHOCYTES NFR BLD AUTO: 28.2 % (ref 24–44)
MCH RBC QN AUTO: 30.1 PG (ref 27–31)
MCHC RBC AUTO-ENTMCNC: 33.2 G/DL (ref 32–36)
MCV RBC AUTO: 90.9 FL (ref 80–99)
MONOCYTES # BLD AUTO: 0.45 10*3/MM3 (ref 0–1)
MONOCYTES NFR BLD AUTO: 6.3 % (ref 0–12)
NEUTROPHILS # BLD AUTO: 4.41 10*3/MM3 (ref 1.5–8.3)
NEUTROPHILS NFR BLD AUTO: 62.2 % (ref 41–71)
PLATELET # BLD AUTO: 225 10*3/MM3 (ref 150–450)
PMV BLD AUTO: 9.4 FL (ref 6–12)
RBC # BLD AUTO: 4.28 10*6/MM3 (ref 3.89–5.14)
WBC NRBC COR # BLD: 7.1 10*3/MM3 (ref 3.5–10.8)

## 2017-12-05 PROCEDURE — 85025 COMPLETE CBC W/AUTO DIFF WBC: CPT

## 2017-12-05 PROCEDURE — 36415 COLL VENOUS BLD VENIPUNCTURE: CPT

## 2017-12-05 PROCEDURE — 86140 C-REACTIVE PROTEIN: CPT | Performed by: INTERNAL MEDICINE

## 2017-12-05 PROCEDURE — 85652 RBC SED RATE AUTOMATED: CPT | Performed by: INTERNAL MEDICINE

## 2018-01-09 ENCOUNTER — TRANSCRIBE ORDERS (OUTPATIENT)
Dept: LAB | Facility: HOSPITAL | Age: 48
End: 2018-01-09

## 2018-01-09 ENCOUNTER — LAB (OUTPATIENT)
Dept: LAB | Facility: HOSPITAL | Age: 48
End: 2018-01-09
Attending: INTERNAL MEDICINE

## 2018-01-09 DIAGNOSIS — IMO0001 WOUND, SURGICAL, INFECTED, SUBSEQUENT ENCOUNTER: ICD-10-CM

## 2018-01-09 DIAGNOSIS — L03.314 CELLULITIS OF GROIN: ICD-10-CM

## 2018-01-09 DIAGNOSIS — IMO0001 WOUND, SURGICAL, INFECTED, SUBSEQUENT ENCOUNTER: Primary | ICD-10-CM

## 2018-01-09 LAB
ALBUMIN SERPL-MCNC: 4.3 G/DL (ref 3.2–4.8)
ALBUMIN/GLOB SERPL: 1.7 G/DL (ref 1.5–2.5)
ALP SERPL-CCNC: 45 U/L (ref 25–100)
ALT SERPL W P-5'-P-CCNC: 27 U/L (ref 7–40)
ANION GAP SERPL CALCULATED.3IONS-SCNC: 8 MMOL/L (ref 3–11)
AST SERPL-CCNC: 20 U/L (ref 0–33)
BASOPHILS # BLD AUTO: 0.04 10*3/MM3 (ref 0–0.2)
BASOPHILS NFR BLD AUTO: 0.5 % (ref 0–1)
BILIRUB SERPL-MCNC: 0.4 MG/DL (ref 0.3–1.2)
BUN BLD-MCNC: 14 MG/DL (ref 9–23)
BUN/CREAT SERPL: 15.6 (ref 7–25)
CALCIUM SPEC-SCNC: 9.2 MG/DL (ref 8.7–10.4)
CHLORIDE SERPL-SCNC: 100 MMOL/L (ref 99–109)
CO2 SERPL-SCNC: 29 MMOL/L (ref 20–31)
CREAT BLD-MCNC: 0.9 MG/DL (ref 0.6–1.3)
CRP SERPL-MCNC: 0.3 MG/DL (ref 0–1)
DEPRECATED RDW RBC AUTO: 44.8 FL (ref 37–54)
EOSINOPHIL # BLD AUTO: 0.17 10*3/MM3 (ref 0–0.3)
EOSINOPHIL NFR BLD AUTO: 2.3 % (ref 0–3)
ERYTHROCYTE [DISTWIDTH] IN BLOOD BY AUTOMATED COUNT: 13.1 % (ref 11.3–14.5)
ERYTHROCYTE [SEDIMENTATION RATE] IN BLOOD: 10 MM/HR (ref 0–20)
GFR SERPL CREATININE-BSD FRML MDRD: 67 ML/MIN/1.73
GLOBULIN UR ELPH-MCNC: 2.6 GM/DL
GLUCOSE BLD-MCNC: 146 MG/DL (ref 70–100)
HCT VFR BLD AUTO: 40.3 % (ref 34.5–44)
HGB BLD-MCNC: 13.1 G/DL (ref 11.5–15.5)
IMM GRANULOCYTES # BLD: 0.02 10*3/MM3 (ref 0–0.03)
IMM GRANULOCYTES NFR BLD: 0.3 % (ref 0–0.6)
LYMPHOCYTES # BLD AUTO: 2.52 10*3/MM3 (ref 0.6–4.8)
LYMPHOCYTES NFR BLD AUTO: 33.9 % (ref 24–44)
MCH RBC QN AUTO: 30.2 PG (ref 27–31)
MCHC RBC AUTO-ENTMCNC: 32.5 G/DL (ref 32–36)
MCV RBC AUTO: 92.9 FL (ref 80–99)
MONOCYTES # BLD AUTO: 0.46 10*3/MM3 (ref 0–1)
MONOCYTES NFR BLD AUTO: 6.2 % (ref 0–12)
NEUTROPHILS # BLD AUTO: 4.23 10*3/MM3 (ref 1.5–8.3)
NEUTROPHILS NFR BLD AUTO: 56.8 % (ref 41–71)
PLATELET # BLD AUTO: 226 10*3/MM3 (ref 150–450)
PMV BLD AUTO: 10 FL (ref 6–12)
POTASSIUM BLD-SCNC: 3.9 MMOL/L (ref 3.5–5.5)
PROT SERPL-MCNC: 6.9 G/DL (ref 5.7–8.2)
RBC # BLD AUTO: 4.34 10*6/MM3 (ref 3.89–5.14)
SODIUM BLD-SCNC: 137 MMOL/L (ref 132–146)
WBC NRBC COR # BLD: 7.44 10*3/MM3 (ref 3.5–10.8)

## 2018-01-09 PROCEDURE — 85025 COMPLETE CBC W/AUTO DIFF WBC: CPT

## 2018-01-09 PROCEDURE — 36415 COLL VENOUS BLD VENIPUNCTURE: CPT | Performed by: INTERNAL MEDICINE

## 2018-01-09 PROCEDURE — 80053 COMPREHEN METABOLIC PANEL: CPT | Performed by: INTERNAL MEDICINE

## 2018-01-09 PROCEDURE — 86140 C-REACTIVE PROTEIN: CPT | Performed by: INTERNAL MEDICINE

## 2018-01-23 ENCOUNTER — OFFICE VISIT (OUTPATIENT)
Dept: GYNECOLOGIC ONCOLOGY | Facility: CLINIC | Age: 48
End: 2018-01-23

## 2018-01-23 VITALS
OXYGEN SATURATION: 98 % | BODY MASS INDEX: 31.37 KG/M2 | SYSTOLIC BLOOD PRESSURE: 128 MMHG | HEART RATE: 72 BPM | WEIGHT: 166 LBS | RESPIRATION RATE: 16 BRPM | TEMPERATURE: 97.5 F | DIASTOLIC BLOOD PRESSURE: 74 MMHG

## 2018-01-23 DIAGNOSIS — L90.0 LICHEN SCLEROSUS ET ATROPHICUS: ICD-10-CM

## 2018-01-23 DIAGNOSIS — C51.9 VULVAR CANCER (HCC): Primary | ICD-10-CM

## 2018-01-23 DIAGNOSIS — N90.89 VULVAR LESION: ICD-10-CM

## 2018-01-23 PROCEDURE — 56605 BIOPSY OF VULVA/PERINEUM: CPT | Performed by: NURSE PRACTITIONER

## 2018-01-23 PROCEDURE — 99213 OFFICE O/P EST LOW 20 MIN: CPT | Performed by: NURSE PRACTITIONER

## 2018-01-23 PROCEDURE — 88305 TISSUE EXAM BY PATHOLOGIST: CPT | Performed by: NURSE PRACTITIONER

## 2018-01-23 RX ORDER — CONJUGATED ESTROGENS 0.62 MG/G
CREAM VAGINAL
COMMUNITY
Start: 2017-11-20 | End: 2018-11-21

## 2018-01-23 NOTE — PROGRESS NOTES
GYN ONCOLOGY CANCER SURVEILLANCE FOLLOW-UP    Edyta Joyce  [id number]  1970    Chief Complaint: Follow-up (bleeding, burning and pain, )        History of present illness:  Edyta Joyce is a 47 y.o. year old female who is here today for ongoing surveillance of Vulvar Cancer, see Cancer History. She reports chronic vulvar itching, soreness, and sensitivity related to lichen sclerosus. She has been unable to use they typical topical steroidal treatments due to her diagnosis with an HPV related cancer. She reports she has been using a compounded cream as prescribed by Dr. Nassar at her last visit without much relief. Her symptoms became markedly worse over the weekend when she began to have spotting of blood from her left vulva with a burning and painful lesion. This is also causing intermittent discomfort to the external tissues when she urinates. She is concerned about possible recurrence and presents today for evaluation.         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 Cabrera   • VULVECTOMY N/A 2017    Procedure: MODIFIED RADICAL  VULVECTOMY, BILATERAL SENTINAL LYMPH NODE DISSECTION, MAPPING BIOPSIES OF VULVA;  Surgeon: Isabela Nassar MD;  Location: [facility] OR;  Service:        MEDICATIONS: The current medication list was reviewed and reconciled.     Allergies:  is allergic to penicillins.    Family History   Problem Relation Age of Onset   • Diabetes Mother    • Congenital heart disease Mother    • Hypertension Mother    • Stroke Mother    • Diabetes Father    • Congenital heart disease Father    • Hypertension Father    • Heart attack Other    • Hyperlipidemia Other        Last imaging study was CT 10/26/2017.     Review of Systems   Constitutional: Negative for appetite change, chills, fatigue, fever and unexpected weight change.   Respiratory: Negative for cough, shortness of breath and wheezing.    Cardiovascular: Negative for chest pain, palpitations and leg swelling.   Gastrointestinal: Negative for abdominal distention, abdominal pain, blood in stool, constipation, diarrhea, nausea and vomiting.   Endocrine: Negative.    Genitourinary: Negative for dyspareunia, dysuria, frequency, genital sores, hematuria, pelvic pain, urgency, vaginal bleeding, vaginal discharge and vaginal pain.   Musculoskeletal: Negative for arthralgias, gait problem and joint swelling.   Neurological: Negative for dizziness, seizures, syncope, weakness, light-headedness, numbness and headaches.   Hematological: Negative for adenopathy.   Psychiatric/Behavioral: Negative.        Physical Exam  Vital Signs: /74  Pulse 72  Temp 97.5 °F (36.4 °C) (Temporal Artery )   Resp 16  Wt 75.3 kg (166 lb)  SpO2 98%  BMI 31.37 kg/m2   General Appearance:  alert, cooperative, no apparent distress and appears stated age   Neurologic/Psychiatric: A&O x 3, gait steady, appropriate affect   HEENT:  Normocephalic, without obvious abnormality, mucous membranes moist   Abdomen:   Soft, non-tender, non-distended, no organomegaly and Exam limited d/t habitus.   Lymph nodes:  No inguinal adenopathy noted   Extremities: Normal, atraumatic; no clubbing, cyanosis, or edema    Pelvic: External Genitalia  changes consistent with prior excision, diffuse lichen sclerosus changes. 2 small ulcerated lesions noted to inner mid left labia, very tender to palpation.  Vagina  is pink, moist, without lesions.  and small introitus  Vaginal Cuff  Female Vaginal Cuff: smooth, intact and without visible lesions  Uterus  surgically absent  Ovaries  without palpable masses or fullness  Parametria  smooth  Rectovaginal  Female rectovaginal: deferred     ECOG Performance Status: 0 - Asymptomatic    Procedure Note:  After discussion of procedure and obtaining consent a vulvar punch biopsy was performed. Lesion located upon middle left vulva, approx <1 cm in size. Site cleaned with betadine in the usual fashion. 3 mL lidocaine without epinephrine injected beneath lesion for local anesthesia. Punch biopsy performed and tissue sample cut away from underlying tissue. Specimen placed into appropriate container and labeled at bedside. Bleeding was Minimal. Silver nitrate used to achieve hemostasis. Patient tolerated the procedure well.         Assessment and Plan:  Edyta was seen today for follow-up.    Diagnoses and all orders for this visit:    Vulvar cancer  -     Surgical Pathology Exam - Tissue, Vulva; Future  -     Surgical Pathology Exam - Tissue, Vulva    Vulvar lesion  -     Surgical Pathology Exam - Tissue, Vulva; Future  -     Surgical Pathology Exam - Tissue, Vulva    Lichen sclerosus et atrophicus        Biopsy of small ulcerated lesion obtained in office today to r/o recurrence. This will be sent to pathology and she will be notified of results upon their return.   She was encouraged to apply a barrier cream such as Aquaphor or Desitin during the day for relief and moisture to the area, then apply compound cream nightly. She was encouraged to add external Premarin cream application on the nights she  uses this as well. If biopsy negative, we may consider alternative compounded topicals to help with symptom management. She is understanding that typical topical steroids are not a good option to her due to her HPV related cancer diagnosis.     Return in about 3 months (around 4/23/2018) for ongoing cancer surveillance.      ISAIAH Feliciano        Note: Speech recognition transcription software was used to dictate portions of this document.  An attempt at proofreading has been made though minor errors in transcription may still be present.  Please do not hesitate to call our office with any questions.

## 2018-01-25 LAB
CYTO UR: NORMAL
LAB AP CASE REPORT: NORMAL
LAB AP CLINICAL INFORMATION: NORMAL
Lab: NORMAL
PATH REPORT.FINAL DX SPEC: NORMAL
PATH REPORT.GROSS SPEC: NORMAL

## 2018-01-26 ENCOUNTER — TELEPHONE (OUTPATIENT)
Dept: GYNECOLOGIC ONCOLOGY | Facility: CLINIC | Age: 48
End: 2018-01-26

## 2018-01-26 NOTE — TELEPHONE ENCOUNTER
Pt phoned office requesting results of biopsy done.  I informed her of benign results.  Pt states the area is burning, bleeding some.  I encouraged her to use Vaseline, I would let Peri know she called and we would call her Monday.  Pt v/u, will await call.

## 2018-01-30 NOTE — TELEPHONE ENCOUNTER
Called patient to follow-up with her. Vulvar biopsy negative, which she is pleased about, but she is frustrated that her lichens sclerosus continues to be so symptomatic. She feels her compounding cream is only causing more itching. She also reports she has been doing some independent research and has read about other laser treatments or chemotherapies that are being used to treat this. She states she is willing to try just about anything to prevent this from continuing or her vulvar cancer recurring.   I informed Edyta that I would like to investigate her compound ingredients further and possibly make some adjustments. I would also like to look further into these other treatments for her. She v/u. I will plan to call her back later in the week after I gather some more information.

## 2018-02-02 ENCOUNTER — TELEPHONE (OUTPATIENT)
Dept: GYNECOLOGIC ONCOLOGY | Facility: CLINIC | Age: 48
End: 2018-02-02

## 2018-02-02 NOTE — TELEPHONE ENCOUNTER
Called patient to follow-up with her regarding our discussion earlier this week. Call went to [employer] and I left a message stating I have made some medication adjustments and that I have additional information on the alternative treatments she inquired about. Encouraged to call back at her convenience or we can talk more at her upcoming clinic visit.    She has been feeling as if her compounded cream is not helping much and we discussed making some adjustments to this medication. I called [facility] and spoke with Huang, pharmacist today. Edyta has been using a compound of Ketamine 10%, Lidocaine 5%, and bupivocaine 2%. We discussed changing medication to help with itching without using steroids and providing better pain relief. We decided to increase lidocaine to 10%, add gabapentin, and add diphenhydramine. Patient will be contacted by pharmacy when new medication is ready for pick-up.     She also inquired about alternative lichen sclerosus treatments she read about during her own online research such as laser treatments or chemotherapies. I have done some additional research myself regarding these options. Unfortunately, most second line treatments for lichen sclerosus are also immunosuppressants, including methotrexate (chemotherapy). These treatments, as with topical steroids, work on the skin condition to decrease immune response. I fear these would also increase her risk for recurrent HPV related vulvar cancer.   Based upon my investigation today, there is some evidence that Dee Nette Touch CO2 laser treatment is being used with some benefit in lichen sclerosus patients. I think this may be worth looking into further. [facility] as well as a few OBGYN providers around [facility]. I feel a consult for external Dee Nette Touch may be beneficial and I would like to offer this to Edyta if Dr. Nassar and oncology team are in agreement.     All of this will be reviewed  with Edyta either over the phone when she returns the call or at her upcoming visit.

## 2018-02-08 ENCOUNTER — CLINICAL SUPPORT (OUTPATIENT)
Dept: GYNECOLOGIC ONCOLOGY | Facility: CLINIC | Age: 48
End: 2018-02-08

## 2018-02-08 VITALS
DIASTOLIC BLOOD PRESSURE: 83 MMHG | TEMPERATURE: 97.3 F | SYSTOLIC BLOOD PRESSURE: 138 MMHG | WEIGHT: 167 LBS | RESPIRATION RATE: 14 BRPM | BODY MASS INDEX: 31.55 KG/M2 | OXYGEN SATURATION: 98 % | HEART RATE: 72 BPM

## 2018-02-08 DIAGNOSIS — C51.9 VULVAR CANCER, CARCINOMA (HCC): ICD-10-CM

## 2018-02-08 DIAGNOSIS — L90.0 LICHEN SCLEROSUS ET ATROPHICUS: Primary | ICD-10-CM

## 2018-02-08 PROCEDURE — 99214 OFFICE O/P EST MOD 30 MIN: CPT | Performed by: NURSE PRACTITIONER

## 2018-02-08 NOTE — PROGRESS NOTES
GYN ONCOLOGY FOLLOW-UP    Edyta Joyce  [id number]  1970    Chief Complaint: Follow-up (still irritated)        History of present illness:  Edyta Joyce is a 47 y.o. year old female who is here today for follow-up for lichen sclerosus management. Her management has been difficult due to her diagnosis of HPV related vulvar cancer. Because of this, she is unable to use typical topical steroidal treatments as this could reduce immune response in the area and put her at risk for recurrent cancer. She has been using a compounded cream without much relief. She was last seen on 1/23/2018 where we discussed a marked worsening of her itching, soreness and sensitivity. Exam was negative for evidence of disease, but diffuse lichen sclerosus was noted with small ulcerations r/t itching. A biopsy was taken and found to be negative. She has continued her Premarin vaginal cream 3x weekly and has been using barrier topicals. She reports symptoms are stable today and ulcerations are healing. Discomfort, dyspareunia, and itching continue to be bothersome.     Since that time, I have investigated alternative treatments for her and she is here to discuss these today. Changes have been made to her compounded topical and she is due to pick this new Rx up later today.       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 Cabrera   • VULVECTOMY N/A 2017    Procedure: MODIFIED RADICAL VULVECTOMY, BILATERAL SENTINAL LYMPH NODE DISSECTION, MAPPING BIOPSIES OF VULVA;  Surgeon: Isabela Nassar MD;  [facility];  Service:        MEDICATIONS: The current medication list was reviewed and reconciled.     Allergies:  is allergic to penicillins.    Family History   Problem Relation Age of Onset   • Diabetes Mother    • Congenital heart disease Mother    • Hypertension Mother    • Stroke Mother    • Diabetes Father    • Congenital heart disease Father    • Hypertension Father    • Heart attack Other    • Hyperlipidemia Other        Review of Systems   Constitutional: Negative for appetite change, chills, fatigue, fever and unexpected weight change.   Respiratory: Negative for cough, shortness of breath and wheezing.    Cardiovascular: Negative for chest pain, palpitations and leg swelling.   Gastrointestinal: Negative for abdominal distention, abdominal pain, blood in stool, constipation, diarrhea, nausea and vomiting.   Endocrine: Negative.    Genitourinary: Negative for dyspareunia, dysuria, frequency, genital sores, hematuria, pelvic pain, urgency, vaginal bleeding, vaginal discharge and vaginal pain.   Musculoskeletal: Negative for arthralgias, gait problem and joint swelling.   Neurological: Negative for dizziness, seizures, syncope, weakness, light-headedness, numbness and headaches.   Hematological: Negative for adenopathy.   Psychiatric/Behavioral: Negative.        Physical Exam  Vital Signs: /83  Pulse 72  Temp 97.3 °F (36.3 °C) (Temporal Artery )   Resp 14  Wt 75.8 kg (167 lb)  SpO2 98%  BMI 31.55 kg/m2   General Appearance:  alert, cooperative, no  apparent distress and appears stated age   Neurologic/Psychiatric: A&O x 3, gait steady, appropriate affect   HEENT:  Normocephalic, without obvious abnormality, mucous membranes moist   Abdomen:   Soft, non-tender, non-distended and no organomegaly   Lymph nodes: No inguinal adenopathy noted   Extremities: Normal, atraumatic; no clubbing, cyanosis, or edema    Pelvic: deferred     ECOG Performance Status: 0 - Asymptomatic    Procedure Notes:  No notes on file    Assessment and Plan:    Edyta was seen today for follow-up.    Diagnoses and all orders for this visit:    Lichen sclerosus et atrophicus    Vulvar cancer, carcinoma        Edyta and I discussed my findings during my additional research regarding management of lichen sclerosus without reducing immune response. Changes have been made to her compound cream with the assistance of compounding pharmacist.  Edyta has been using a compound of Ketamine 10%, Lidocaine 5%, and bupivocaine 2%. We discussed changing medication to help with itching without using steroids and providing better pain relief. We decided to increase lidocaine to 10%, add gabapentin, and add diphenhydramine. She is due to pick this up later today.     We discussed 1st line treatment of lichen sclerosus is topical steroids, which she understands is not a good option for her. Most 2nd line treatments I could find are also immunosuppressants, including methotrexate (chemotherapy). As these may even further decrease the immune response, this is also not a good option.   I explained to Edyta that I was able to find some case studies in which Dee Nette Touch CO2 laser treatment is being used with some benefit in lichen sclerosus patients. We discussed this is a newer treatment for vaginal dryness and dyspareunia. I have also referred some other patients for this treatment who have had radiation in the area, who are reporting benefits. As I do not perform this treatment, I am unsure about the  possibility of external treatments, but based upon the case studies I could find, I think this is worth looking into further. As she is also suffering dyspareunia and internal dryness, she may get a dual benefit. She is very interested in a consult.     Edyta has been a long time patient of Dr. Abigail Fong of [facility]. Her partner, Dr. Sampson, performs the Dee Nette Touch. Our office will organize a consultation and I will send MD a letter prior to her visit. Informational pamphlet on procedure given at check-out today.   Otherwise, we will see her back in 3 months for routine surveillance and Survivorship. Encouraged to call with any questions or concerns.     This was a 25 minute visit with 18 minutes spent in direct face-to-face discussion of treatment options, risks, and benefits.     Return in about 3 months (around 5/8/2018) for Survivorship.      ISAIAH Feliciano      Note: Speech recognition transcription software was used to dictate portions of this document.  An attempt at proofreading has been made though minor errors in transcription may still be present.  Please do not hesitate to call our office with any questions.

## 2018-02-19 LAB
ALBUMIN SERPL-MCNC: 4.4 G/DL (ref 3.5–5)
ALBUMIN/GLOB SERPL: 1.5 G/DL (ref 1–2)
ALP SERPL-CCNC: 55 U/L (ref 38–126)
ALT SERPL-CCNC: 35 U/L (ref 13–69)
AST SERPL-CCNC: 21 U/L (ref 15–46)
BASOPHILS # BLD AUTO: 0.05 10*3/MM3 (ref 0–0.2)
BASOPHILS NFR BLD AUTO: 1 % (ref 0–2.5)
BILIRUB SERPL-MCNC: 0.4 MG/DL (ref 0.2–1.3)
BUN SERPL-MCNC: 12 MG/DL (ref 7–20)
BUN/CREAT SERPL: 17.1 (ref 7.1–23.5)
CALCIUM SERPL-MCNC: 9.7 MG/DL (ref 8.4–10.2)
CHLORIDE SERPL-SCNC: 103 MMOL/L (ref 98–107)
CHOLEST SERPL-MCNC: 237 MG/DL (ref 0–199)
CO2 SERPL-SCNC: 29 MMOL/L (ref 26–30)
CREAT SERPL-MCNC: 0.7 MG/DL (ref 0.6–1.3)
EOSINOPHIL # BLD AUTO: 0.15 10*3/MM3 (ref 0–0.7)
EOSINOPHIL NFR BLD AUTO: 3 % (ref 0–7)
ERYTHROCYTE [DISTWIDTH] IN BLOOD BY AUTOMATED COUNT: 12.5 % (ref 11.5–14.5)
GFR SERPLBLD CREATININE-BSD FMLA CKD-EPI: 109 ML/MIN/1.73
GFR SERPLBLD CREATININE-BSD FMLA CKD-EPI: 90 ML/MIN/1.73
GLOBULIN SER CALC-MCNC: 3 GM/DL
GLUCOSE SERPL-MCNC: 94 MG/DL (ref 74–98)
HBA1C MFR BLD: 5.4 %
HCT VFR BLD AUTO: 41.3 % (ref 37–47)
HDLC SERPL-MCNC: 54 MG/DL (ref 40–60)
HGB BLD-MCNC: 13.6 G/DL (ref 12–16)
IMM GRANULOCYTES # BLD: 0.01 10*3/MM3 (ref 0–0.06)
IMM GRANULOCYTES NFR BLD: 0.2 % (ref 0–0.6)
LDLC SERPL CALC-MCNC: 104 MG/DL (ref 0–99)
LYMPHOCYTES # BLD AUTO: 1.49 10*3/MM3 (ref 0.6–3.4)
LYMPHOCYTES NFR BLD AUTO: 30.2 % (ref 10–50)
MCH RBC QN AUTO: 30.7 PG (ref 27–31)
MCHC RBC AUTO-ENTMCNC: 32.9 G/DL (ref 30–37)
MCV RBC AUTO: 93.2 FL (ref 81–99)
MONOCYTES # BLD AUTO: 0.27 10*3/MM3 (ref 0–0.9)
MONOCYTES NFR BLD AUTO: 5.5 % (ref 0–12)
NEUTROPHILS # BLD AUTO: 2.96 10*3/MM3 (ref 2–6.9)
NEUTROPHILS NFR BLD AUTO: 60.1 % (ref 37–80)
NRBC BLD AUTO-RTO: 0 /100 WBC (ref 0–0)
PLATELET # BLD AUTO: 229 10*3/MM3 (ref 130–400)
POTASSIUM SERPL-SCNC: 4.6 MMOL/L (ref 3.5–5.1)
PROT SERPL-MCNC: 7.4 G/DL (ref 6.3–8.2)
RBC # BLD AUTO: 4.43 10*6/MM3 (ref 4.2–5.4)
SODIUM SERPL-SCNC: 145 MMOL/L (ref 137–145)
T4 FREE SERPL-MCNC: 0.81 NG/DL (ref 0.78–2.19)
TRIGL SERPL-MCNC: 395 MG/DL
TSH SERPL DL<=0.005 MIU/L-ACNC: 2.02 MIU/ML (ref 0.47–4.68)
VIT B12 SERPL-MCNC: 314 PG/ML (ref 239–931)
VLDLC SERPL CALC-MCNC: 79 MG/DL
WBC # BLD AUTO: 4.93 10*3/MM3 (ref 4.8–10.8)

## 2018-02-21 ENCOUNTER — TRANSCRIBE ORDERS (OUTPATIENT)
Dept: LAB | Facility: HOSPITAL | Age: 48
End: 2018-02-21

## 2018-02-21 ENCOUNTER — LAB (OUTPATIENT)
Dept: LAB | Facility: HOSPITAL | Age: 48
End: 2018-02-21

## 2018-02-21 DIAGNOSIS — L03.314 CELLULITIS OF GROIN: ICD-10-CM

## 2018-02-21 DIAGNOSIS — B95.1 GBBS (GROUP B BETA HEMOLYTIC STREPTOCOCCUS) PHARYNGITIS: ICD-10-CM

## 2018-02-21 DIAGNOSIS — J02.0 GBBS (GROUP B BETA HEMOLYTIC STREPTOCOCCUS) PHARYNGITIS: ICD-10-CM

## 2018-02-21 DIAGNOSIS — C51.9 CARCINOMA OF VULVA (HCC): ICD-10-CM

## 2018-02-21 DIAGNOSIS — T82.848A PAIN FROM ARTERIOVENOUS FISTULA (HCC): ICD-10-CM

## 2018-02-21 DIAGNOSIS — D72.823 NEUTROPHILIC LEUKEMOID REACTION: ICD-10-CM

## 2018-02-21 DIAGNOSIS — IMO0001 WOUND, SURGICAL, INFECTED, SUBSEQUENT ENCOUNTER: ICD-10-CM

## 2018-02-21 DIAGNOSIS — IMO0001 WOUND, SURGICAL, INFECTED, SUBSEQUENT ENCOUNTER: Primary | ICD-10-CM

## 2018-02-21 DIAGNOSIS — D69.59 THROMBOCYTOPENIA DUE TO DIMINISHED PLATELET PRODUCTION: ICD-10-CM

## 2018-02-21 LAB
BACTERIA UR QL AUTO: ABNORMAL /HPF
BILIRUB UR QL STRIP: NEGATIVE
CLARITY UR: CLEAR
COLOR UR: YELLOW
GLUCOSE UR STRIP-MCNC: NEGATIVE MG/DL
HGB UR QL STRIP.AUTO: NEGATIVE
HYALINE CASTS UR QL AUTO: ABNORMAL /LPF
KETONES UR QL STRIP: NEGATIVE
LEUKOCYTE ESTERASE UR QL STRIP.AUTO: ABNORMAL
NITRITE UR QL STRIP: NEGATIVE
PH UR STRIP.AUTO: 7 [PH] (ref 5–8)
PROT UR QL STRIP: NEGATIVE
RBC # UR: ABNORMAL /HPF
REF LAB TEST METHOD: ABNORMAL
SP GR UR STRIP: 1.02 (ref 1–1.03)
SQUAMOUS #/AREA URNS HPF: ABNORMAL /HPF
UROBILINOGEN UR QL STRIP: ABNORMAL
WBC UR QL AUTO: ABNORMAL /HPF
YEAST URNS QL MICRO: ABNORMAL /HPF

## 2018-02-21 PROCEDURE — 81001 URINALYSIS AUTO W/SCOPE: CPT

## 2018-02-21 PROCEDURE — 87086 URINE CULTURE/COLONY COUNT: CPT

## 2018-02-23 LAB
BACTERIA SPEC AEROBE CULT: NORMAL
BACTERIA SPEC AEROBE CULT: NORMAL

## 2018-03-05 ENCOUNTER — OFFICE VISIT (OUTPATIENT)
Dept: INTERNAL MEDICINE | Facility: CLINIC | Age: 48
End: 2018-03-05

## 2018-03-05 VITALS
RESPIRATION RATE: 16 BRPM | DIASTOLIC BLOOD PRESSURE: 88 MMHG | BODY MASS INDEX: 31.34 KG/M2 | TEMPERATURE: 97.7 F | WEIGHT: 166 LBS | HEART RATE: 67 BPM | OXYGEN SATURATION: 99 % | HEIGHT: 61 IN | SYSTOLIC BLOOD PRESSURE: 133 MMHG

## 2018-03-05 DIAGNOSIS — G47.33 OSA (OBSTRUCTIVE SLEEP APNEA): ICD-10-CM

## 2018-03-05 DIAGNOSIS — Z00.00 ROUTINE GENERAL MEDICAL EXAMINATION AT A HEALTH CARE FACILITY: ICD-10-CM

## 2018-03-05 DIAGNOSIS — E78.1 HIGH BLOOD TRIGLYCERIDES: ICD-10-CM

## 2018-03-05 DIAGNOSIS — I10 HYPERTENSION, ESSENTIAL, BENIGN: Primary | ICD-10-CM

## 2018-03-05 PROCEDURE — 99213 OFFICE O/P EST LOW 20 MIN: CPT | Performed by: INTERNAL MEDICINE

## 2018-03-05 RX ORDER — ESTRADIOL 0.1 MG/G
CREAM VAGINAL 2 TIMES WEEKLY
COMMUNITY
Start: 2018-02-21 | End: 2021-02-22

## 2018-03-05 NOTE — PROGRESS NOTES
"Subjective     Patient ID: Edyta Joyce is a 47 y.o. female. Patient is here for management of multiple medical problems.     Chief Complaint   Patient presents with   • Hypertension     follow-up     History of Present Illness     Pt had cellulitis and just came off abx.    Urine with abnormal ua that was contaminated. Cult.    Tired all the time. Witnessed apnea. + snoring.        The following portions of the patient's history were reviewed and updated as appropriate: allergies, current medications, past family history, past medical history, past social history, past surgical history and problem list.    Review of Systems   Constitutional: Positive for fatigue.   Psychiatric/Behavioral: Positive for sleep disturbance.   All other systems reviewed and are negative.      Current Outpatient Prescriptions:   •  estradiol (ESTRACE) 0.1 MG/GM vaginal cream, , Disp: , Rfl:   •  ezetimibe (ZETIA) 10 MG tablet, Take 1 tablet by mouth Daily., Disp: 90 tablet, Rfl: 3  •  hydrocortisone 2.5 % cream, , Disp: , Rfl:   •  lisinopril-hydrochlorothiazide (PRINZIDE,ZESTORETIC) 10-12.5 MG per tablet, Take 1 tablet by mouth Daily., Disp: 90 tablet, Rfl: 3  •  rosuvastatin (CRESTOR) 40 MG tablet, Take 1 tablet by mouth Every Night., Disp: 90 tablet, Rfl: 3  •  estrogens, conjugated, (PREMARIN) 0.625 MG tablet, Take 1 tablet by mouth Daily., Disp: 30 tablet, Rfl: 0  •  PREMARIN 0.625 MG/GM vaginal cream, , Disp: , Rfl:     Objective      Blood pressure 133/88, pulse 67, temperature 97.7 °F (36.5 °C), temperature source Oral, resp. rate 16, height 154.9 cm (61\"), weight 75.3 kg (166 lb), SpO2 99 %.    Physical Exam     General Appearance:    Alert, cooperative, no distress, appears stated age   Head:    Normocephalic, without obvious abnormality, atraumatic   Eyes:    PERRL, conjunctiva/corneas clear, EOM's intact   Ears:    Normal TM's and external ear canals, both ears   Nose:   Nares normal, septum midline, mucosa normal, no " drainage   or sinus tenderness   Throat:   Lips, mucosa, and tongue normal; teeth and gums normal   Neck:   Supple, symmetrical, trachea midline, no adenopathy;        thyroid:  No enlargement/tenderness/nodules; no carotid    bruit or JVD   Back:     Symmetric, no curvature, ROM normal, no CVA tenderness   Lungs:     Clear to auscultation bilaterally, respirations unlabored   Chest wall:    No tenderness or deformity   Heart:    Regular rate and rhythm, S1 and S2 normal, no murmur,        rub or gallop   Abdomen:     Soft, non-tender, bowel sounds active all four quadrants,     no masses, no organomegaly   Extremities:   Extremities normal, atraumatic, no cyanosis or edema   Pulses:   2+ and symmetric all extremities   Skin:   Skin color, texture, turgor normal, no rashes or lesions   Lymph nodes:   Cervical, supraclavicular, and axillary nodes normal   Neurologic:   CNII-XII intact. Normal strength, sensation and reflexes       throughout      Results for orders placed or performed in visit on 02/21/18   Urine Culture - Urine, Urine, Clean Catch   Result Value Ref Range    Urine Culture      Urine Culture 40,000-50,000 CFU/mL Normal Urogenital Falguni    Urinalysis With / Microscopic If Indicated - Urine, Clean Catch   Result Value Ref Range    Color, UA Yellow Yellow, Straw    Appearance, UA Clear Clear    pH, UA 7.0 5.0 - 8.0    Specific Gravity, UA 1.023 1.001 - 1.030    Glucose, UA Negative Negative    Ketones, UA Negative Negative    Bilirubin, UA Negative Negative    Blood, UA Negative Negative    Protein, UA Negative Negative    Leuk Esterase, UA Moderate (2+) (A) Negative    Nitrite, UA Negative Negative    Urobilinogen, UA 0.2 E.U./dL 0.2 - 1.0 E.U./dL   Urinalysis, Microscopic Only - Urine, Clean Catch   Result Value Ref Range    RBC, UA 0-2 None Seen, 0-2 /HPF    WBC, UA 6-12 (A) None Seen, 0-2 /HPF    Bacteria, UA 2+ (A) None Seen, Trace /HPF    Squamous Epithelial Cells, UA 7-12 (A) None Seen, 0-2 /HPF     Yeast, UA Small/1+ Budding Yeast None Seen /HPF    Hyaline Casts, UA 0-6 0 - 6 /LPF    Methodology Manual Light Microscopy          Assessment/Plan     Pt will start back on diet and exercise.        Edyta was seen today for hypertension.    Diagnoses and all orders for this visit:    Hypertension, essential, benign  -     TSH  -     T4, Free  -     Vitamin B12  -     Vitamin D 25 Hydroxy  -     Comprehensive Metabolic Panel  -     CBC & Differential    High blood triglycerides  -     TSH  -     T4, Free  -     Vitamin B12  -     Vitamin D 25 Hydroxy  -     Comprehensive Metabolic Panel  -     CBC & Differential    Routine general medical examination at a health care facility  -     Ambulatory Referral to Dermatology      Return in about 6 months (around 9/5/2018).          There are no Patient Instructions on file for this visit.     Elias Zavaleta MD    Assessment/Plan

## 2018-03-07 ENCOUNTER — TELEPHONE (OUTPATIENT)
Dept: OBSTETRICS AND GYNECOLOGY | Facility: CLINIC | Age: 48
End: 2018-03-07

## 2018-03-07 NOTE — TELEPHONE ENCOUNTER
Edyta called the office earlier this week to report she has seen Dr. Sampson for [facility]gr[facility] Touch consult. Per her report, she was prescribed a generic vaginal estrogen to use internally and also a mild topical steroid to use externally. She called to ask about the steroid cream as Dr. Nassar has previously instructed her not to use these medications. I informed her I am requesting his notes for review and should have them tomorrow. At this time, I would like to investigate the prescriptions and go over with Dr. Nassar prior to the use of the steroid. I will call her back if MD feels it is ok for her to use the prescribed steroid. She v/u.

## 2018-03-19 ENCOUNTER — TELEPHONE (OUTPATIENT)
Dept: OBSTETRICS AND GYNECOLOGY | Facility: CLINIC | Age: 48
End: 2018-03-19

## 2018-03-19 RX ORDER — FLUCONAZOLE 150 MG/1
150 TABLET ORAL
Qty: 2 TABLET | Refills: 0 | Status: SHIPPED | OUTPATIENT
Start: 2018-03-19 | End: 2018-03-23

## 2018-03-19 NOTE — TELEPHONE ENCOUNTER
Called patient back to discuss concerns. She describes thick white discharge with increased itching--likely yeast infection. Diflucan x 2 doses sent to pharmacy. I have also reviewed Dr. Sampson's notes. MD aware pt cannot have strong topical steroid treatments for lichens, but has prescribed short course of hydrocortisone. Pt ok to begin this for symptom management. Precautions reviewed and instructed to be seen in clinic if new lesions.

## 2018-03-21 ENCOUNTER — OFFICE VISIT (OUTPATIENT)
Dept: INTERNAL MEDICINE | Facility: CLINIC | Age: 48
End: 2018-03-21

## 2018-03-21 VITALS
HEART RATE: 72 BPM | WEIGHT: 165 LBS | RESPIRATION RATE: 16 BRPM | DIASTOLIC BLOOD PRESSURE: 81 MMHG | SYSTOLIC BLOOD PRESSURE: 127 MMHG | BODY MASS INDEX: 31.15 KG/M2 | OXYGEN SATURATION: 100 % | TEMPERATURE: 97.9 F | HEIGHT: 61 IN

## 2018-03-21 DIAGNOSIS — J20.8 ACUTE BRONCHITIS DUE TO OTHER SPECIFIED ORGANISMS: Primary | ICD-10-CM

## 2018-03-21 PROCEDURE — 99213 OFFICE O/P EST LOW 20 MIN: CPT | Performed by: INTERNAL MEDICINE

## 2018-03-21 RX ORDER — DOXYCYCLINE 100 MG/1
100 CAPSULE ORAL EVERY 12 HOURS SCHEDULED
Qty: 20 CAPSULE | Refills: 0 | Status: SHIPPED | OUTPATIENT
Start: 2018-03-21 | End: 2018-11-21

## 2018-03-21 RX ORDER — DEXTROMETHORPHAN HYDROBROMIDE AND PROMETHAZINE HYDROCHLORIDE 15; 6.25 MG/5ML; MG/5ML
5 SYRUP ORAL 4 TIMES DAILY PRN
Qty: 240 ML | Refills: 0 | Status: SHIPPED | OUTPATIENT
Start: 2018-03-21 | End: 2018-11-21

## 2018-03-21 NOTE — PROGRESS NOTES
Subjective     Patient ID: Edyta Joyce is a 47 y.o. female. Patient is here for management of multiple medical problems.     Chief Complaint   Patient presents with   • Cough     coughing x 2 weeks, chest hurts when coughing, headache, ears hurt, nose bleeding, wheezing     History of Present Illness   Wheezing now. Cough x 2 weeks.   Chills.  Mucinex. Fever yesterday.  Soa.  Thick green sputum.    + sick contact. Nose  Bleeds            The following portions of the patient's history were reviewed and updated as appropriate: allergies, current medications, past family history, past medical history, past social history, past surgical history and problem list.    Review of Systems   HENT: Positive for congestion.    Respiratory: Positive for cough and shortness of breath.    Cardiovascular: Positive for chest pain.       Current Outpatient Prescriptions:   •  estradiol (ESTRACE) 0.1 MG/GM vaginal cream, , Disp: , Rfl:   •  estrogens, conjugated, (PREMARIN) 0.625 MG tablet, Take 1 tablet by mouth Daily., Disp: 30 tablet, Rfl: 0  •  ezetimibe (ZETIA) 10 MG tablet, Take 1 tablet by mouth Daily., Disp: 90 tablet, Rfl: 3  •  fluconazole (DIFLUCAN) 150 MG tablet, Take 1 tablet by mouth Every 3 (Three) Days for 2 doses., Disp: 2 tablet, Rfl: 0  •  hydrocortisone 2.5 % cream, , Disp: , Rfl:   •  lisinopril-hydrochlorothiazide (PRINZIDE,ZESTORETIC) 10-12.5 MG per tablet, Take 1 tablet by mouth Daily., Disp: 90 tablet, Rfl: 3  •  PREMARIN 0.625 MG/GM vaginal cream, , Disp: , Rfl:   •  rosuvastatin (CRESTOR) 40 MG tablet, Take 1 tablet by mouth Every Night., Disp: 90 tablet, Rfl: 3  •  doxycycline (MONODOX) 100 MG capsule, Take 1 capsule by mouth Every 12 (Twelve) Hours., Disp: 20 capsule, Rfl: 0  •  promethazine-dextromethorphan (PROMETHAZINE-DM) 6.25-15 MG/5ML syrup, Take 5 mL by mouth 4 (Four) Times a Day As Needed for Cough., Disp: 240 mL, Rfl: 0  •  Tdap (BOOSTRIX) 5-2.5-18.5 LF-MCG/0.5 injection, Inject 0.5 mL  "into the shoulder, thigh, or buttocks 1 (One) Time for 1 dose., Disp: 0.5 mL, Rfl: 0    Objective      Blood pressure 127/81, pulse 72, temperature 97.9 °F (36.6 °C), temperature source Oral, resp. rate 16, height 154.9 cm (61\"), weight 74.8 kg (165 lb), SpO2 100 %.    Physical Exam     General Appearance:    Alert, cooperative, no distress, appears stated age   Head:    Normocephalic, without obvious abnormality, atraumatic   Eyes:    PERRL, conjunctiva/corneas clear, EOM's intact   Ears:    Normal TM's and external ear canals, both ears   Nose:   Nares normal, septum midline, mucosa normal, no drainage   or sinus tenderness   Throat:   Lips, mucosa, and tongue normal; teeth and gums normal   Neck:   Supple, symmetrical, trachea midline, no adenopathy;        thyroid:  No enlargement/tenderness/nodules; no carotid    bruit or JVD   Back:     Symmetric, no curvature, ROM normal, no CVA tenderness   Lungs:     Clear to auscultation bilaterally, respirations unlabored   Chest wall:    No tenderness or deformity   Heart:    Regular rate and rhythm, S1 and S2 normal, no murmur,        rub or gallop   Abdomen:     Soft, non-tender, bowel sounds active all four quadrants,     no masses, no organomegaly   Extremities:   Extremities normal, atraumatic, no cyanosis or edema   Pulses:   2+ and symmetric all extremities   Skin:   Skin color, texture, turgor normal, no rashes or lesions   Lymph nodes:   Cervical, supraclavicular, and axillary nodes normal   Neurologic:   CNII-XII intact. Normal strength, sensation and reflexes       throughout      Results for orders placed or performed in visit on 02/21/18   Urine Culture - Urine, Urine, Clean Catch   Result Value Ref Range    Urine Culture      Urine Culture 40,000-50,000 CFU/mL Normal Urogenital Falguni    Urinalysis With / Microscopic If Indicated - Urine, Clean Catch   Result Value Ref Range    Color, UA Yellow Yellow, Straw    Appearance, UA Clear Clear    pH, UA 7.0 5.0 - " 8.0    Specific Gravity, UA 1.023 1.001 - 1.030    Glucose, UA Negative Negative    Ketones, UA Negative Negative    Bilirubin, UA Negative Negative    Blood, UA Negative Negative    Protein, UA Negative Negative    Leuk Esterase, UA Moderate (2+) (A) Negative    Nitrite, UA Negative Negative    Urobilinogen, UA 0.2 E.U./dL 0.2 - 1.0 E.U./dL   Urinalysis, Microscopic Only - Urine, Clean Catch   Result Value Ref Range    RBC, UA 0-2 None Seen, 0-2 /HPF    WBC, UA 6-12 (A) None Seen, 0-2 /HPF    Bacteria, UA 2+ (A) None Seen, Trace /HPF    Squamous Epithelial Cells, UA 7-12 (A) None Seen, 0-2 /HPF    Yeast, UA Small/1+ Budding Yeast None Seen /HPF    Hyaline Casts, UA 0-6 0 - 6 /LPF    Methodology Manual Light Microscopy          Assessment/Plan         Edyta was seen today for cough.    Diagnoses and all orders for this visit:    Acute bronchitis due to other specified organisms    Other orders  -     Tdap (BOOSTRIX) 5-2.5-18.5 LF-MCG/0.5 injection; Inject 0.5 mL into the shoulder, thigh, or buttocks 1 (One) Time for 1 dose.  -     doxycycline (MONODOX) 100 MG capsule; Take 1 capsule by mouth Every 12 (Twelve) Hours.  -     promethazine-dextromethorphan (PROMETHAZINE-DM) 6.25-15 MG/5ML syrup; Take 5 mL by mouth 4 (Four) Times a Day As Needed for Cough.      Return if symptoms worsen or fail to improve.          There are no Patient Instructions on file for this visit.     Elias Zavaleta MD    Assessment/Plan

## 2018-09-28 ENCOUNTER — LAB (OUTPATIENT)
Dept: LAB | Facility: HOSPITAL | Age: 48
End: 2018-09-28

## 2018-09-28 ENCOUNTER — TRANSCRIBE ORDERS (OUTPATIENT)
Dept: LAB | Facility: HOSPITAL | Age: 48
End: 2018-09-28

## 2018-09-28 DIAGNOSIS — J02.0 GBBS (GROUP B BETA HEMOLYTIC STREPTOCOCCUS) PHARYNGITIS: ICD-10-CM

## 2018-09-28 DIAGNOSIS — M25.551 RIGHT HIP PAIN: ICD-10-CM

## 2018-09-28 DIAGNOSIS — M25.551 RIGHT HIP PAIN: Primary | ICD-10-CM

## 2018-09-28 DIAGNOSIS — R50.9 HYPERTHERMIA-INDUCED DEFECT: ICD-10-CM

## 2018-09-28 DIAGNOSIS — L03.314 CELLULITIS OF GROIN: ICD-10-CM

## 2018-09-28 DIAGNOSIS — B95.1 GBBS (GROUP B BETA HEMOLYTIC STREPTOCOCCUS) PHARYNGITIS: ICD-10-CM

## 2018-09-28 LAB
ANION GAP SERPL CALCULATED.3IONS-SCNC: 11 MMOL/L (ref 3–11)
BASOPHILS # BLD AUTO: 0.03 10*3/MM3 (ref 0–0.2)
BASOPHILS NFR BLD AUTO: 0.5 % (ref 0–1)
BUN BLD-MCNC: 10 MG/DL (ref 9–23)
BUN/CREAT SERPL: 15.6 (ref 7–25)
CALCIUM SPEC-SCNC: 9 MG/DL (ref 8.7–10.4)
CHLORIDE SERPL-SCNC: 104 MMOL/L (ref 99–109)
CO2 SERPL-SCNC: 27 MMOL/L (ref 20–31)
CREAT BLD-MCNC: 0.64 MG/DL (ref 0.6–1.3)
CRP SERPL-MCNC: 0.23 MG/DL (ref 0–1)
DEPRECATED RDW RBC AUTO: 42 FL (ref 37–54)
EOSINOPHIL # BLD AUTO: 0.16 10*3/MM3 (ref 0–0.3)
EOSINOPHIL NFR BLD AUTO: 2.7 % (ref 0–3)
ERYTHROCYTE [DISTWIDTH] IN BLOOD BY AUTOMATED COUNT: 12.7 % (ref 11.3–14.5)
GFR SERPL CREATININE-BSD FRML MDRD: 99 ML/MIN/1.73
GLUCOSE BLD-MCNC: 78 MG/DL (ref 70–100)
HCT VFR BLD AUTO: 42.8 % (ref 34.5–44)
HGB BLD-MCNC: 14.2 G/DL (ref 11.5–15.5)
IMM GRANULOCYTES # BLD: 0.01 10*3/MM3 (ref 0–0.03)
IMM GRANULOCYTES NFR BLD: 0.2 % (ref 0–0.6)
LYMPHOCYTES # BLD AUTO: 1.84 10*3/MM3 (ref 0.6–4.8)
LYMPHOCYTES NFR BLD AUTO: 31.1 % (ref 24–44)
MCH RBC QN AUTO: 30.5 PG (ref 27–31)
MCHC RBC AUTO-ENTMCNC: 33.2 G/DL (ref 32–36)
MCV RBC AUTO: 91.8 FL (ref 80–99)
MONOCYTES # BLD AUTO: 0.34 10*3/MM3 (ref 0–1)
MONOCYTES NFR BLD AUTO: 5.8 % (ref 0–12)
NEUTROPHILS # BLD AUTO: 3.54 10*3/MM3 (ref 1.5–8.3)
NEUTROPHILS NFR BLD AUTO: 59.9 % (ref 41–71)
PLATELET # BLD AUTO: 223 10*3/MM3 (ref 150–450)
PMV BLD AUTO: 10.4 FL (ref 6–12)
POTASSIUM BLD-SCNC: 3.8 MMOL/L (ref 3.5–5.5)
RBC # BLD AUTO: 4.66 10*6/MM3 (ref 3.89–5.14)
SODIUM BLD-SCNC: 142 MMOL/L (ref 132–146)
WBC NRBC COR # BLD: 5.91 10*3/MM3 (ref 3.5–10.8)

## 2018-09-28 PROCEDURE — 36415 COLL VENOUS BLD VENIPUNCTURE: CPT

## 2018-09-28 PROCEDURE — 80048 BASIC METABOLIC PNL TOTAL CA: CPT

## 2018-09-28 PROCEDURE — 85025 COMPLETE CBC W/AUTO DIFF WBC: CPT

## 2018-09-28 PROCEDURE — 86140 C-REACTIVE PROTEIN: CPT

## 2018-11-07 ENCOUNTER — CLINICAL SUPPORT (OUTPATIENT)
Dept: GYNECOLOGIC ONCOLOGY | Facility: CLINIC | Age: 48
End: 2018-11-07

## 2018-11-07 VITALS
OXYGEN SATURATION: 98 % | SYSTOLIC BLOOD PRESSURE: 136 MMHG | DIASTOLIC BLOOD PRESSURE: 74 MMHG | TEMPERATURE: 97.1 F | BODY MASS INDEX: 30.8 KG/M2 | WEIGHT: 163 LBS | RESPIRATION RATE: 16 BRPM | HEART RATE: 68 BPM

## 2018-11-07 DIAGNOSIS — C51.9 VULVAR CANCER, CARCINOMA (HCC): Primary | ICD-10-CM

## 2018-11-07 DIAGNOSIS — L90.0 LICHEN SCLEROSUS ET ATROPHICUS: ICD-10-CM

## 2018-11-07 PROCEDURE — 99214 OFFICE O/P EST MOD 30 MIN: CPT | Performed by: NURSE PRACTITIONER

## 2018-11-07 NOTE — PATIENT INSTRUCTIONS
Use estrogen cream externally nightly x 2 weeks, then internally and externally 2-3 times per week as night. If new prescription or refills needed, our office is happy to help.     Continue hydrocortisone cream as needed for irritation/flare-ups.

## 2018-11-08 NOTE — PROGRESS NOTES
GYN ONCOLOGY CANCER SURVIVORSHIP VISIT    Edyta Franco  [id number]  1970    Chief Complaint: Follow-up (if she uses steroids it thins skin and it cracks)        History of present illness:  Edyta Franco is a 48 y.o. year old female who is here today for her Cancer Survivorship visit, see oncology history below. She is accompanied today by her . They were  in 12/2017 and she has changed her last name since her last visit. She is also undergoing management for severe lichen sclerosus. She has been evaluated by Dr. Sampson and Dr. Abigail Fong in consideration of [facility] [employer], but this is not covered by insurance for her condition and out of pocket cost is very high. She has been using topical hydrocortisone locally, approx every other day, for symptom management with the understanding of risks of topical steroids in this area. She feels this is helpful in relieving the itching from the lichens, but causes the skin to thin and crack. She has also been using vaginal estrogen cream, recently switched to Estrace, currently using only once weekly at night with the applicator. Both medications are filled by Dr. Fong and she completed her annual there over the summer.   Otherwise, Edyta is feeling generally well today with no new complaints. She denies vaginal bleeding, pelvic pain, concerning lesions, or changes in bowel or bladder function.      Cancer History:      Vulvar cancer, carcinoma (CMS/HCC)    8/14/2017 Biopsy     History of lichens sclerosus, on clobetasol therapy. Pt presented to Dr. Abigail Fong with c/o painful ulcerative left vulvar lesion. Biopsy revealed invasive SCC, extending into deep margin at least 3.5 mm. Referred to Gyn Oncology         8/30/2017 Surgery     Modified radical vulvectomy, left inguinofemoral sentinel lymph node dissection, right inguinofemoral lymph node dissection, and mapping biopsies of the vulva. Surgery by Dr. Nassar and Dr. Bree Russell.      Pathology revealed SCC, focally keratinizing with 0.3 cm invasion. LVSI negative, lymph nodes negative, mapping biopsies negative. Stage IB grade 2           2017 - 2017 Other Event     Inpatient hospital admission for inguinal incision cellulitis, inguinal fluid collection, and SIRS. Treated with IV antibiotics and sent home on oral antibiotics.          10/26/2017 - 10/27/2017 Other Event     2 day inpatient admission for recurrent cellulitis, resolved with antibiotic management            Past Medical History:   Diagnosis Date   • Acute upper respiratory infection    • Bronchitis    • Cough    • Epilepsy (CMS/HCC)    • Fatigue    • Fever    • Hypercholesteremia    • Hypertension    • Influenza    • Lichen sclerosus of female genitalia    • Ovarian follicular cyst    • Sinusitis    • Sore throat    • UTI (urinary tract infection) 10/2/2017   • Vulva cancer (CMS/HCC)        Past Surgical History:   Procedure Laterality Date   • ANKLE SURGERY     •  SECTION     • HYSTERECTOMY     • LUMBAR LAMINECTOMY  2011    HEMILAMI RT-C7-T1, RT C7-T1 MEDIAL FACET & CT-T1 DISC (Dr. Cabrera   • VULVECTOMY N/A 2017    Procedure: MODIFIED RADICAL VULVECTOMY, BILATERAL SENTINAL LYMPH NODE DISSECTION, MAPPING BIOPSIES OF VULVA;  Surgeon: Isabela Nassar MD;  Location: [facility];  Service:        MEDICATIONS: The current medication list was reviewed and reconciled.     Allergies:  is allergic to penicillins.    Family History   Problem Relation Age of Onset   • Diabetes Mother    • Congenital heart disease Mother    • Hypertension Mother    • Stroke Mother    • Diabetes Father    • Congenital heart disease Father    • Hypertension Father    • Heart attack Other    • Hyperlipidemia Other    • Cancer Neg Hx      Last imaging study was CT 10/26/2017.     Review of Systems   Constitutional: Negative for appetite change, chills, fatigue, fever and unexpected weight change.   Respiratory: Negative for cough,  shortness of breath and wheezing.    Cardiovascular: Negative for chest pain, palpitations and leg swelling.   Gastrointestinal: Negative for abdominal distention, abdominal pain, blood in stool, constipation, diarrhea, nausea and vomiting.   Endocrine: Negative.    Genitourinary: Negative for dyspareunia, dysuria, frequency, hematuria, pelvic pain, urgency, vaginal bleeding and vaginal discharge.        Dryness, itching r/t lichen sclerosus   Musculoskeletal: Negative for arthralgias, gait problem and joint swelling.   Neurological: Negative for dizziness, seizures, syncope, weakness, light-headedness, numbness and headaches.   Hematological: Negative for adenopathy.   Psychiatric/Behavioral: Negative.        Physical Exam  Vital Signs: /74   Pulse 68   Temp 97.1 °F (36.2 °C) (Temporal Artery )   Resp 16   Wt 73.9 kg (163 lb)   SpO2 98%   BMI 30.80 kg/m²    General Appearance:  alert, cooperative, no apparent distress and appears stated age   Neurologic/Psychiatric: A&O x 3, gait steady, appropriate affect   HEENT:  Normocephalic, without obvious abnormality, mucous membranes moist   Lungs:   Clear to auscultation bilaterally; respirations regular, even, and unlabored bilaterally   Heart:  Regular rate and rhythm, no murmurs appreciated   Breasts:  deferred   Abdomen:   Soft, non-tender, non-distended and no organomegaly   Lymph nodes: No inguinal adenopathy noted   Extremities: Normal, atraumatic; no clubbing, cyanosis, or edema    Pelvic: External Genitalia  changes consistent with prior excision, diffuse lichen sclerosus changes with labial fusion. No new or concerning lesions  Vagina  is pink, moist, without lesions.  and small intoroitus  Vaginal Cuff  Female Vaginal Cuff: smooth, intact and without visible lesions  Uterus  surgically absent  Ovaries  without palpable masses or fullness  Parametria  smooth  Rectovaginal  Female rectovaginal: deferred     ECOG Performance Status: 0 -  Asymptomatic    Procedure Note:  No notes on file      Assessment and Plan:  Edyta was seen today for follow-up.    Diagnoses and all orders for this visit:    Vulvar cancer, carcinoma (CMS/HCC)    Lichen sclerosus et atrophicus        There is no evidence of disease upon today's exam. She is understanding to call with any new lesions, changes in pelvic symptoms, or general GYN concerns at any time between regularly scheduled visits. She will continue annual exams with Dr. Abigail Fong, due in summer 2019.     The patient,her , and I have reviewed her Survivorship Care Plan in detail. We discussed her diagnosis, pathology, histology, all treatments, and ongoing surveillance recommendations. All questions were answered to her satisfaction. She is in agreement with our plan for ongoing surveillance as outlined in her plan. A copy of this document was provided to her at the completion of our visit.  A copy has also been sent to her primary care provider.    We also discussed status of her lichen sclerosus. Hydrocortisone topically is helpful when symptoms flare and she is understanding of risks and to use this sparingly. I would not recommend any stronger topical steroids due to cancer recurrence risk. He is concerned about thinning and cracking tissues when this is used. Instructions given to increase her vaginal Estrace cream regimen to help strengthen. Tissues. Apply externally nightly x2 weeks, then use internally and externally 2-3x per week before bed. Apply Aquaphor or Desitin during the day for barrier protection and moisture. We will plan a follow-up in 3 months. If doing well and tissues improving, she may cancel this and move it out to a 6 month follow-up. Of course, she should call with any new lesions or if symptoms worsen. She v/u.     This was a 30 minute direct face to face visit with 20 minutes spent in review of her Survivorship Care Plan.      Return to clinic in 3 months for ongoing cancer  surveillance and medication follow-up.      Yamile Torres, APRN

## 2018-11-13 ENCOUNTER — LAB (OUTPATIENT)
Dept: LAB | Facility: HOSPITAL | Age: 48
End: 2018-11-13

## 2018-11-13 ENCOUNTER — TRANSCRIBE ORDERS (OUTPATIENT)
Dept: LAB | Facility: HOSPITAL | Age: 48
End: 2018-11-13

## 2018-11-13 DIAGNOSIS — B95.1 GBBS (GROUP B BETA HEMOLYTIC STREPTOCOCCUS) PHARYNGITIS: ICD-10-CM

## 2018-11-13 DIAGNOSIS — M25.551 RIGHT HIP PAIN: Primary | ICD-10-CM

## 2018-11-13 DIAGNOSIS — J02.0 GBBS (GROUP B BETA HEMOLYTIC STREPTOCOCCUS) PHARYNGITIS: ICD-10-CM

## 2018-11-13 DIAGNOSIS — L03.314 CELLULITIS OF GROIN: ICD-10-CM

## 2018-11-13 DIAGNOSIS — M25.551 RIGHT HIP PAIN: ICD-10-CM

## 2018-11-13 DIAGNOSIS — R50.9 FEVER AND CHILLS: ICD-10-CM

## 2018-11-13 LAB
ALBUMIN SERPL-MCNC: 4.36 G/DL (ref 3.2–4.8)
ALBUMIN/GLOB SERPL: 2 G/DL (ref 1.5–2.5)
ALP SERPL-CCNC: 42 U/L (ref 25–100)
ALT SERPL W P-5'-P-CCNC: 28 U/L (ref 7–40)
ANION GAP SERPL CALCULATED.3IONS-SCNC: 5 MMOL/L (ref 3–11)
AST SERPL-CCNC: 20 U/L (ref 0–33)
BASOPHILS # BLD AUTO: 0.04 10*3/MM3 (ref 0–0.2)
BASOPHILS NFR BLD AUTO: 0.6 % (ref 0–1)
BILIRUB SERPL-MCNC: 0.4 MG/DL (ref 0.3–1.2)
BUN BLD-MCNC: 17 MG/DL (ref 9–23)
BUN/CREAT SERPL: 18.7 (ref 7–25)
CALCIUM SPEC-SCNC: 9.2 MG/DL (ref 8.7–10.4)
CHLORIDE SERPL-SCNC: 102 MMOL/L (ref 99–109)
CO2 SERPL-SCNC: 30 MMOL/L (ref 20–31)
CREAT BLD-MCNC: 0.91 MG/DL (ref 0.6–1.3)
CRP SERPL-MCNC: 0.19 MG/DL (ref 0–1)
DEPRECATED RDW RBC AUTO: 42.6 FL (ref 37–54)
EOSINOPHIL # BLD AUTO: 0.11 10*3/MM3 (ref 0–0.3)
EOSINOPHIL NFR BLD AUTO: 1.7 % (ref 0–3)
ERYTHROCYTE [DISTWIDTH] IN BLOOD BY AUTOMATED COUNT: 12.5 % (ref 11.3–14.5)
ERYTHROCYTE [SEDIMENTATION RATE] IN BLOOD: 8 MM/HR (ref 0–20)
GFR SERPL CREATININE-BSD FRML MDRD: 66 ML/MIN/1.73
GLOBULIN UR ELPH-MCNC: 2.1 GM/DL
GLUCOSE BLD-MCNC: 83 MG/DL (ref 70–100)
HCT VFR BLD AUTO: 42.5 % (ref 34.5–44)
HGB BLD-MCNC: 13.6 G/DL (ref 11.5–15.5)
IMM GRANULOCYTES # BLD: 0.01 10*3/MM3 (ref 0–0.03)
IMM GRANULOCYTES NFR BLD: 0.2 % (ref 0–0.6)
LYMPHOCYTES # BLD AUTO: 2.14 10*3/MM3 (ref 0.6–4.8)
LYMPHOCYTES NFR BLD AUTO: 32.7 % (ref 24–44)
MCH RBC QN AUTO: 30.2 PG (ref 27–31)
MCHC RBC AUTO-ENTMCNC: 32 G/DL (ref 32–36)
MCV RBC AUTO: 94.4 FL (ref 80–99)
MONOCYTES # BLD AUTO: 0.41 10*3/MM3 (ref 0–1)
MONOCYTES NFR BLD AUTO: 6.3 % (ref 0–12)
NEUTROPHILS # BLD AUTO: 3.84 10*3/MM3 (ref 1.5–8.3)
NEUTROPHILS NFR BLD AUTO: 58.5 % (ref 41–71)
PLATELET # BLD AUTO: 221 10*3/MM3 (ref 150–450)
PMV BLD AUTO: 10.6 FL (ref 6–12)
POTASSIUM BLD-SCNC: 4.3 MMOL/L (ref 3.5–5.5)
PROT SERPL-MCNC: 6.5 G/DL (ref 5.7–8.2)
RBC # BLD AUTO: 4.5 10*6/MM3 (ref 3.89–5.14)
SODIUM BLD-SCNC: 137 MMOL/L (ref 132–146)
WBC NRBC COR # BLD: 6.55 10*3/MM3 (ref 3.5–10.8)

## 2018-11-13 PROCEDURE — 36415 COLL VENOUS BLD VENIPUNCTURE: CPT

## 2018-11-13 PROCEDURE — 80053 COMPREHEN METABOLIC PANEL: CPT

## 2018-11-13 PROCEDURE — 85025 COMPLETE CBC W/AUTO DIFF WBC: CPT

## 2018-11-13 PROCEDURE — 85652 RBC SED RATE AUTOMATED: CPT

## 2018-11-13 PROCEDURE — 86140 C-REACTIVE PROTEIN: CPT

## 2018-11-21 ENCOUNTER — OFFICE VISIT (OUTPATIENT)
Dept: INTERNAL MEDICINE | Facility: CLINIC | Age: 48
End: 2018-11-21

## 2018-11-21 VITALS
BODY MASS INDEX: 30.96 KG/M2 | HEART RATE: 72 BPM | SYSTOLIC BLOOD PRESSURE: 127 MMHG | TEMPERATURE: 97.4 F | WEIGHT: 164 LBS | HEIGHT: 61 IN | OXYGEN SATURATION: 98 % | DIASTOLIC BLOOD PRESSURE: 75 MMHG

## 2018-11-21 DIAGNOSIS — E53.8 LOW SERUM VITAMIN B12: ICD-10-CM

## 2018-11-21 DIAGNOSIS — E78.00 HYPERCHOLESTEREMIA: ICD-10-CM

## 2018-11-21 DIAGNOSIS — I10 HYPERTENSION, ESSENTIAL, BENIGN: ICD-10-CM

## 2018-11-21 DIAGNOSIS — M77.9 TENDINITIS: Primary | ICD-10-CM

## 2018-11-21 PROCEDURE — 99213 OFFICE O/P EST LOW 20 MIN: CPT | Performed by: INTERNAL MEDICINE

## 2018-11-21 NOTE — PROGRESS NOTES
"Subjective     Patient ID: Edyta Franco is a 48 y.o. female. Patient is here for management of multiple medical problems.     Chief Complaint   Patient presents with   • Hand Pain     Rt thumb x 1 week      History of Present Illness     Pt right x 1 week.  No reason. thumb catching and dificult to write at school.  No pian.  No otc.          The following portions of the patient's history were reviewed and updated as appropriate: allergies, current medications, past family history, past medical history, past social history, past surgical history and problem list.    Review of Systems   Constitutional: Positive for fatigue.       Current Outpatient Medications:   •  estradiol (ESTRACE) 0.1 MG/GM vaginal cream, , Disp: , Rfl:   •  estrogens, conjugated, (PREMARIN) 0.625 MG tablet, Take 1 tablet by mouth Daily., Disp: 30 tablet, Rfl: 0  •  ezetimibe (ZETIA) 10 MG tablet, Take 1 tablet by mouth Daily., Disp: 90 tablet, Rfl: 3  •  hydrocortisone 2.5 % cream, , Disp: , Rfl:   •  lisinopril-hydrochlorothiazide (PRINZIDE,ZESTORETIC) 10-12.5 MG per tablet, Take 1 tablet by mouth Daily., Disp: 90 tablet, Rfl: 3  •  rosuvastatin (CRESTOR) 40 MG tablet, Take 1 tablet by mouth Every Night., Disp: 90 tablet, Rfl: 3    Objective      Blood pressure 127/75, pulse 72, temperature 97.4 °F (36.3 °C), temperature source Oral, height 154.9 cm (61\"), weight 74.4 kg (164 lb), SpO2 98 %.    Physical Exam     General Appearance:    Alert, cooperative, no distress, appears stated age   Head:    Normocephalic, without obvious abnormality, atraumatic   Eyes:    PERRL, conjunctiva/corneas clear, EOM's intact   Ears:    Normal TM's and external ear canals, both ears   Nose:   Nares normal, septum midline, mucosa normal, no drainage   or sinus tenderness   Throat:   Lips, mucosa, and tongue normal; teeth and gums normal   Neck:   Supple, symmetrical, trachea midline, no adenopathy;        thyroid:  No enlargement/tenderness/nodules; no " carotid    bruit or JVD   Back:     Symmetric, no curvature, ROM normal, no CVA tenderness   Lungs:     Clear to auscultation bilaterally, respirations unlabored   Chest wall:    No tenderness or deformity   Heart:    Regular rate and rhythm, S1 and S2 normal, no murmur,        rub or gallop   Abdomen:     Soft, non-tender, bowel sounds active all four quadrants,     no masses, no organomegaly   Extremities:   Extremities normal, atraumatic, no cyanosis or edema   Pulses:   2+ and symmetric all extremities   Skin:   Skin color, texture, turgor normal, no rashes or lesions   Lymph nodes:   Cervical, supraclavicular, and axillary nodes normal   Neurologic:   CNII-XII intact. Normal strength, sensation and reflexes       throughout      Results for orders placed or performed in visit on 11/13/18   Comprehensive Metabolic Panel   Result Value Ref Range    Glucose 83 70 - 100 mg/dL    BUN 17 9 - 23 mg/dL    Creatinine 0.91 0.60 - 1.30 mg/dL    Sodium 137 132 - 146 mmol/L    Potassium 4.3 3.5 - 5.5 mmol/L    Chloride 102 99 - 109 mmol/L    CO2 30.0 20.0 - 31.0 mmol/L    Calcium 9.2 8.7 - 10.4 mg/dL    Total Protein 6.5 5.7 - 8.2 g/dL    Albumin 4.36 3.20 - 4.80 g/dL    ALT (SGPT) 28 7 - 40 U/L    AST (SGOT) 20 0 - 33 U/L    Alkaline Phosphatase 42 25 - 100 U/L    Total Bilirubin 0.4 0.3 - 1.2 mg/dL    eGFR Non African Amer 66 >60 mL/min/1.73    Globulin 2.1 gm/dL    A/G Ratio 2.0 1.5 - 2.5 g/dL    BUN/Creatinine Ratio 18.7 7.0 - 25.0    Anion Gap 5.0 3.0 - 11.0 mmol/L   Sedimentation Rate   Result Value Ref Range    Sed Rate 8 0 - 20 mm/hr   C-reactive Protein   Result Value Ref Range    C-Reactive Protein 0.19 0.00 - 1.00 mg/dL   CBC Auto Differential   Result Value Ref Range    WBC 6.55 3.50 - 10.80 10*3/mm3    RBC 4.50 3.89 - 5.14 10*6/mm3    Hemoglobin 13.6 11.5 - 15.5 g/dL    Hematocrit 42.5 34.5 - 44.0 %    MCV 94.4 80.0 - 99.0 fL    MCH 30.2 27.0 - 31.0 pg    MCHC 32.0 32.0 - 36.0 g/dL    RDW 12.5 11.3 - 14.5 %     RDW-SD 42.6 37.0 - 54.0 fl    MPV 10.6 6.0 - 12.0 fL    Platelets 221 150 - 450 10*3/mm3    Neutrophil % 58.5 41.0 - 71.0 %    Lymphocyte % 32.7 24.0 - 44.0 %    Monocyte % 6.3 0.0 - 12.0 %    Eosinophil % 1.7 0.0 - 3.0 %    Basophil % 0.6 0.0 - 1.0 %    Immature Grans % 0.2 0.0 - 0.6 %    Neutrophils, Absolute 3.84 1.50 - 8.30 10*3/mm3    Lymphocytes, Absolute 2.14 0.60 - 4.80 10*3/mm3    Monocytes, Absolute 0.41 0.00 - 1.00 10*3/mm3    Eosinophils, Absolute 0.11 0.00 - 0.30 10*3/mm3    Basophils, Absolute 0.04 0.00 - 0.20 10*3/mm3    Immature Grans, Absolute 0.01 0.00 - 0.03 10*3/mm3         Assessment/Plan   Pain gel fo rtendinitis.  Till hold on ortho conusult.  Pt seen Dr Botello in the past.        Edyta was seen today for hand pain.    Diagnoses and all orders for this visit:    Tendinitis    Low serum vitamin B12  -     Lipid Panel  -     CBC & Differential  -     Vitamin B12  -     Comprehensive Metabolic Panel  -     TSH  -     T4, Free    Hypertension, essential, benign  -     Lipid Panel  -     CBC & Differential  -     Vitamin B12  -     Comprehensive Metabolic Panel  -     TSH  -     T4, Free    Hypercholesteremia  -     Lipid Panel  -     TSH  -     T4, Free      Return in about 3 months (around 2/21/2019).          There are no Patient Instructions on file for this visit.     Elias Zavaleta MD    Assessment/Plan

## 2018-11-30 RX ORDER — LISINOPRIL AND HYDROCHLOROTHIAZIDE 12.5; 1 MG/1; MG/1
TABLET ORAL
Qty: 90 TABLET | Refills: 1 | Status: SHIPPED | OUTPATIENT
Start: 2018-11-30 | End: 2019-07-28 | Stop reason: SDUPTHER

## 2018-12-22 DIAGNOSIS — E78.01 FAMILIAL HYPERCHOLESTEROLEMIA: ICD-10-CM

## 2018-12-24 RX ORDER — ROSUVASTATIN CALCIUM 40 MG/1
40 TABLET, COATED ORAL NIGHTLY
Qty: 90 TABLET | Refills: 1 | Status: SHIPPED | OUTPATIENT
Start: 2018-12-24 | End: 2019-01-24 | Stop reason: SDUPTHER

## 2019-01-24 ENCOUNTER — TELEPHONE (OUTPATIENT)
Dept: INTERNAL MEDICINE | Facility: CLINIC | Age: 49
End: 2019-01-24

## 2019-01-24 ENCOUNTER — OFFICE VISIT (OUTPATIENT)
Dept: INTERNAL MEDICINE | Facility: CLINIC | Age: 49
End: 2019-01-24

## 2019-01-24 VITALS
HEART RATE: 64 BPM | DIASTOLIC BLOOD PRESSURE: 86 MMHG | OXYGEN SATURATION: 98 % | HEIGHT: 61 IN | TEMPERATURE: 97.4 F | WEIGHT: 161 LBS | SYSTOLIC BLOOD PRESSURE: 152 MMHG | BODY MASS INDEX: 30.4 KG/M2

## 2019-01-24 DIAGNOSIS — E78.01 FAMILIAL HYPERCHOLESTEROLEMIA: ICD-10-CM

## 2019-01-24 DIAGNOSIS — R52 BODY ACHES: ICD-10-CM

## 2019-01-24 DIAGNOSIS — J11.1 INFLUENZA: Primary | ICD-10-CM

## 2019-01-24 LAB
EXPIRATION DATE: NORMAL
FLUAV AG NPH QL: NEGATIVE
FLUBV AG NPH QL: NEGATIVE
INTERNAL CONTROL: NORMAL
Lab: NORMAL

## 2019-01-24 PROCEDURE — 87804 INFLUENZA ASSAY W/OPTIC: CPT | Performed by: INTERNAL MEDICINE

## 2019-01-24 PROCEDURE — 99213 OFFICE O/P EST LOW 20 MIN: CPT | Performed by: INTERNAL MEDICINE

## 2019-01-24 RX ORDER — OSELTAMIVIR PHOSPHATE 75 MG/1
75 CAPSULE ORAL 2 TIMES DAILY
Qty: 10 CAPSULE | Refills: 0 | Status: SHIPPED | OUTPATIENT
Start: 2019-01-24 | End: 2019-02-25

## 2019-01-24 RX ORDER — ROSUVASTATIN CALCIUM 40 MG/1
40 TABLET, COATED ORAL NIGHTLY
Qty: 90 TABLET | Refills: 1 | Status: SHIPPED | OUTPATIENT
Start: 2019-01-24 | End: 2020-02-10

## 2019-01-24 NOTE — TELEPHONE ENCOUNTER
Patient called stating that she has been having flu like symptoms since Tuesday night. Aching and fever. 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 She would like to know if something can be called in for her. Please advise.  I advised that  is not in the office.

## 2019-01-24 NOTE — PROGRESS NOTES
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     The following portions of the patient's history were reviewed and updated as appropriate: allergies, current medications, past family history, past medical history, past social history, past surgical history and problem list.    Review of Systems   Constitutional: Negative for chills, fatigue and fever.   HENT: Negative for congestion, ear pain, rhinorrhea, sinus pressure and sore throat.    Eyes: Negative for visual disturbance.   Respiratory: Negative for cough, chest tightness, shortness of breath and wheezing.    Cardiovascular: Negative for chest pain, palpitations and leg swelling.   Gastrointestinal: Negative for abdominal pain, blood in stool, constipation, diarrhea, nausea and vomiting.   Endocrine: Negative for polydipsia and polyuria.   Genitourinary: Negative for dysuria and hematuria.   Musculoskeletal: Negative for back pain.   Skin: Negative for rash.   Neurological: Negative for dizziness, light-headedness, numbness and headaches.   Psychiatric/Behavioral: Negative for dysphoric mood and sleep disturbance. The patient is not nervous/anxious.        Allergies   Allergen Reactions   • Penicillins Hives       Past Medical History:   Diagnosis Date   • Acute upper respiratory infection    • Bronchitis    • Cough    • Epilepsy (CMS/[facility])    • Fatigue    • Fever    • Hypercholesteremia    • Hypertension    • Influenza    • Lichen sclerosus of female genitalia    • Ovarian follicular cyst    • Sinusitis    • Sore throat    • UTI (urinary tract infection) 10/2/2017   • Vulva cancer (CMS/[facility])        Social History     Socioeconomic  History   • Marital status: Single     Spouse name: Not on file   • Number of children: 2   • Years of education: Not on file   • Highest education level: Not on file   Social Needs   • Financial resource strain: Not on file   • Food insecurity - worry: Not on file   • Food insecurity - inability: Not on file   • Transportation needs - medical: Not on file   • Transportation needs - non-medical: Not on file   Occupational History   • Not on file   Tobacco Use   • Smoking status: Never Smoker   Substance and Sexual Activity   • Alcohol use: No   • Drug use: No   • Sexual activity: Defer   Other Topics Concern   • Not on file   Social History Narrative   • Not on file        Past Surgical History:   Procedure Laterality Date   • ANKLE SURGERY     •  SECTION     • HAND SURGERY Right    • HYSTERECTOMY     • LUMBAR LAMINECTOMY  2011    HEMILAMI RT-C7-T1, RT C7-T1 MEDIAL FACET & CT-T1 DISC (Dr. Cabrera   • VULVECTOMY N/A 2017    Procedure: MODIFIED RADICAL VULVECTOMY, BILATERAL SENTINAL LYMPH NODE DISSECTION, MAPPING BIOPSIES OF VULVA;  Surgeon: Isabela Nassar MD;  Location: [facility];  Service:        Family History   Problem Relation Age of Onset   • Diabetes Mother    • Congenital heart disease Mother    • Hypertension Mother    • Stroke Mother    • Diabetes Father    • Congenital heart disease Father    • Hypertension Father    • Heart attack Other    • Hyperlipidemia Other    • Cancer Neg Hx          Current Outpatient Medications:   •  estradiol (ESTRACE) 0.1 MG/GM vaginal cream, , Disp: , Rfl:   •  estrogens, conjugated, (PREMARIN) 0.625 MG tablet, Take 1 tablet by mouth Daily., Disp: 30 tablet, Rfl: 0  •  ezetimibe (ZETIA) 10 MG tablet, Take 1 tablet by mouth Daily., Disp: 90 tablet, Rfl: 3  •  hydrocortisone 2.5 % cream, , Disp: , Rfl:   •  lisinopril-hydrochlorothiazide (PRINZIDE,ZESTORETIC) 10-12.5 MG per tablet, TAKE ONE TABLET BY MOUTH ONCE DAILY, Disp: 90 tablet, Rfl: 1  •   "rosuvastatin (CRESTOR) 40 MG tablet, Take 1 tablet by mouth Every Night., Disp: 90 tablet, Rfl: 1  •  oseltamivir (TAMIFLU) 75 MG capsule, Take 1 capsule by mouth 2 (Two) Times a Day., Disp: 10 capsule, Rfl: 0    Objective   /86 (BP Location: Left arm, Patient Position: Sitting)   Pulse 64   Temp 97.4 °F (36.3 °C)   Ht 154.9 cm (61\")   Wt 73 kg (161 lb)   SpO2 98%   BMI 30.42 kg/m²     Physical Exam   Constitutional: She is oriented to person, place, and time. She appears well-developed and well-nourished.   Ill appearing.   HENT:   Head: Normocephalic and atraumatic.   Eyes: Conjunctivae are normal.   Cardiovascular: Normal rate, regular rhythm and normal heart sounds.   Pulmonary/Chest: Effort normal.   Musculoskeletal: Normal range of motion.   Neurological: She is alert and oriented to person, place, and time.   Psychiatric: She has a normal mood and affect. Her behavior is normal.   Nursing note and vitals reviewed.    Results for orders placed or performed in visit on 01/24/19   POC Influenza A / B   Result Value Ref Range    Rapid Influenza A Ag Negative Negative    Rapid Influenza B Ag Negative Negative    Internal Control Passed Passed    Lot Number [id number]     Expiration Date 05/16/2021          Assessment/Plan   Edyta was seen today for fatigue.    Diagnoses and all orders for this visit:    Influenza  -     oseltamivir (TAMIFLU) 75 MG capsule; Take 1 capsule by mouth 2 (Two) Times a Day.    Body aches  -     POC Influenza A / B    Familial hypercholesterolemia  -     rosuvastatin (CRESTOR) 40 MG tablet; Take 1 tablet by mouth Every Night.          Discussion Summary:    49 yo W F presenting with influenza.  Pt started on Tamiflu.  Advised on hand washing and methods to avoid transmission.  Fluids encouraged.  Zofran for nausea.     HLD - stable, Crestor refilled.     Follow up:  No Follow-up on file.     Patient Instructions:  Patient instructions were provided.  "

## 2019-02-25 ENCOUNTER — OFFICE VISIT (OUTPATIENT)
Dept: GYNECOLOGIC ONCOLOGY | Facility: CLINIC | Age: 49
End: 2019-02-25

## 2019-02-25 VITALS
BODY MASS INDEX: 31.37 KG/M2 | SYSTOLIC BLOOD PRESSURE: 141 MMHG | DIASTOLIC BLOOD PRESSURE: 83 MMHG | WEIGHT: 166 LBS | RESPIRATION RATE: 16 BRPM | TEMPERATURE: 97.7 F | OXYGEN SATURATION: 98 % | HEART RATE: 74 BPM

## 2019-02-25 DIAGNOSIS — N89.8 VAGINAL DISCHARGE: ICD-10-CM

## 2019-02-25 DIAGNOSIS — T14.8XXA: ICD-10-CM

## 2019-02-25 DIAGNOSIS — C51.9 VULVAR CANCER, CARCINOMA (HCC): Primary | ICD-10-CM

## 2019-02-25 DIAGNOSIS — L90.0 LICHEN SCLEROSUS ET ATROPHICUS: ICD-10-CM

## 2019-02-25 PROCEDURE — 99214 OFFICE O/P EST MOD 30 MIN: CPT | Performed by: NURSE PRACTITIONER

## 2019-02-25 RX ORDER — METRONIDAZOLE 500 MG/1
500 TABLET ORAL 2 TIMES DAILY
Qty: 14 TABLET | Refills: 0 | Status: SHIPPED | OUTPATIENT
Start: 2019-02-25 | End: 2019-03-04

## 2019-04-17 ENCOUNTER — OFFICE VISIT (OUTPATIENT)
Dept: INTERNAL MEDICINE | Facility: CLINIC | Age: 49
End: 2019-04-17

## 2019-04-17 VITALS
SYSTOLIC BLOOD PRESSURE: 130 MMHG | DIASTOLIC BLOOD PRESSURE: 82 MMHG | WEIGHT: 170 LBS | OXYGEN SATURATION: 97 % | HEART RATE: 71 BPM | RESPIRATION RATE: 18 BRPM | BODY MASS INDEX: 32.12 KG/M2 | TEMPERATURE: 98.1 F

## 2019-04-17 DIAGNOSIS — J40 BRONCHITIS: Primary | ICD-10-CM

## 2019-04-17 PROCEDURE — 99213 OFFICE O/P EST LOW 20 MIN: CPT | Performed by: PHYSICIAN ASSISTANT

## 2019-04-17 RX ORDER — DEXTROMETHORPHAN HYDROBROMIDE AND PROMETHAZINE HYDROCHLORIDE 15; 6.25 MG/5ML; MG/5ML
5 SYRUP ORAL NIGHTLY PRN
Qty: 118 ML | Refills: 0 | OUTPATIENT
Start: 2019-04-17 | End: 2019-10-27

## 2019-04-17 RX ORDER — DOXYCYCLINE HYCLATE 100 MG/1
100 CAPSULE ORAL 2 TIMES DAILY
Qty: 20 CAPSULE | Refills: 0 | OUTPATIENT
Start: 2019-04-17 | End: 2019-10-27

## 2019-04-17 NOTE — PROGRESS NOTES
Subjective     Chief Complaint   Patient presents with   • Cough       History of Present Illness     Edyta Franco is a 48 y.o. female presenting with complaints of fatigue, cough, congestion, some shortness of breath with exertion worsening over the past week or so. Cough is worse at bedtime. She denies any fevers or chest pain. Minimal relief with OTC medications.    The following portions of the patient's history were reviewed and updated as appropriate: current medications, allergies, PMH.    Review of Systems   Constitutional: Positive for chills. Negative for appetite change, fatigue, fever and unexpected weight change.   HENT: Positive for congestion. Negative for ear pain, hearing loss, nosebleeds, sinus pressure, sore throat, tinnitus and trouble swallowing.    Eyes: Negative for pain, discharge, redness, itching and visual disturbance.   Respiratory: Positive for cough and shortness of breath. Negative for chest tightness and wheezing.    Cardiovascular: Negative for chest pain, palpitations and leg swelling.   Gastrointestinal: Negative for abdominal pain, blood in stool, constipation, diarrhea, nausea and vomiting.   Endocrine: Negative for cold intolerance, heat intolerance, polydipsia, polyphagia and polyuria.   Genitourinary: Negative for decreased urine volume, dysuria, flank pain, frequency and hematuria.   Musculoskeletal: Negative for arthralgias, back pain, gait problem, joint swelling, myalgias, neck pain and neck stiffness.   Skin: Negative for color change and rash.   Allergic/Immunologic: Negative for environmental allergies, food allergies and immunocompromised state.   Neurological: Negative for dizziness, syncope, weakness, light-headedness and headaches.   Hematological: Negative for adenopathy. Does not bruise/bleed easily.   Psychiatric/Behavioral: Positive for sleep disturbance. Negative for dysphoric mood and suicidal ideas. The patient is not nervous/anxious.        Objective      Vitals:    04/17/19 1717   BP: 130/82   Pulse: 71   Resp: 18   Temp: 98.1 °F (36.7 °C)   TempSrc: Temporal   SpO2: 97%   Weight: 77.1 kg (170 lb)       Physical Exam   Constitutional: Appears well-developed and well-nourished.   HENT:   Right Ear: Tympanic membrane and external ear normal.   Left Ear: Tympanic membrane and external ear normal.   Nose: Nose normal.   Mouth/Throat: slight OP erythema  Eyes: EOM are normal. Pupils are equal, round, and reactive to light.   Neck: Normal range of motion. Neck supple.   Cardiovascular: Normal rate, regular rhythm.  Pulmonary/Chest: Effort normal and breath sounds normal. Significant coughing fits with deep breaths.  Abdominal: Soft. Bowel sounds are normal. There is no CVA tenderness.   Musculoskeletal: Normal range of motion.   Lymphadenopathy: No cervical adenopathy noted.   Neurological: Alert and oriented to person, place, and time.   Skin: Skin is warm and dry.   Psychiatric: Exhibits a normal mood and affect.     Assessment/Plan     Diagnoses and all orders for this visit:    Bronchitis  -     promethazine-dextromethorphan (PROMETHAZINE-DM) 6.25-15 MG/5ML syrup; Take 5 mL by mouth At Night As Needed for Cough.  -     doxycycline (VIBRAMYCIN) 100 MG capsule; Take 1 capsule by mouth 2 (Two) Times a Day.    Increase water intake, mucinex, rest.    Zamzam Baker PA-C  04/17/2019         Please note that portions of this note were completed with a voice recognition program. Efforts were made to edit dictation, but occasionally words are mistranscribed.

## 2019-04-18 DIAGNOSIS — E78.01 FAMILIAL HYPERCHOLESTEROLEMIA: ICD-10-CM

## 2019-04-18 RX ORDER — EZETIMIBE 10 MG/1
TABLET ORAL
Qty: 90 TABLET | Refills: 3 | OUTPATIENT
Start: 2019-04-18 | End: 2019-10-27

## 2019-04-19 ENCOUNTER — TELEPHONE (OUTPATIENT)
Dept: INTERNAL MEDICINE | Facility: CLINIC | Age: 49
End: 2019-04-19

## 2019-04-19 RX ORDER — METHYLPREDNISOLONE 4 MG/1
TABLET ORAL
Qty: 21 TABLET | Refills: 0 | OUTPATIENT
Start: 2019-04-19 | End: 2019-10-27

## 2019-04-19 NOTE — TELEPHONE ENCOUNTER
Please ask if patient has taken steroids before and if she tolerates them okay. If so I can send these in for her. This is only day 3 of antibiotic, I wouldn't change that at this time.

## 2019-04-19 NOTE — TELEPHONE ENCOUNTER
COUGH IS STILL NOT ANY BETTER WAS TOLD TO CALL BACK TODAY IF NOT SO SOME DIFFERENT MEDICINES COULD BE CALLED IN. TO [facility] IN [place]. PLEASE GIVE PATIENT A CALL WHEN ITS CALLED IN THANKS

## 2019-06-14 ENCOUNTER — TELEPHONE (OUTPATIENT)
Dept: INTERNAL MEDICINE | Facility: CLINIC | Age: 49
End: 2019-06-14

## 2019-06-14 RX ORDER — SCOLOPAMINE TRANSDERMAL SYSTEM 1 MG/1
1 PATCH, EXTENDED RELEASE TRANSDERMAL
Qty: 4 EACH | Refills: 0 | OUTPATIENT
Start: 2019-06-14 | End: 2019-10-27

## 2019-06-14 NOTE — TELEPHONE ENCOUNTER
Pt called stating that she is flying Sunday and wanting to know if we can send in Scopolamine patches?

## 2019-07-29 RX ORDER — LISINOPRIL AND HYDROCHLOROTHIAZIDE 12.5; 1 MG/1; MG/1
TABLET ORAL
Qty: 90 TABLET | Refills: 1 | Status: SHIPPED | OUTPATIENT
Start: 2019-07-29 | End: 2020-02-25 | Stop reason: ALTCHOICE

## 2019-11-15 ENCOUNTER — OFFICE VISIT (OUTPATIENT)
Dept: GYNECOLOGIC ONCOLOGY | Facility: CLINIC | Age: 49
End: 2019-11-15

## 2019-11-15 VITALS
HEART RATE: 73 BPM | SYSTOLIC BLOOD PRESSURE: 135 MMHG | BODY MASS INDEX: 30.04 KG/M2 | WEIGHT: 159 LBS | DIASTOLIC BLOOD PRESSURE: 72 MMHG | RESPIRATION RATE: 10 BRPM

## 2019-11-15 DIAGNOSIS — C51.9 VULVAR CANCER, CARCINOMA (HCC): Primary | ICD-10-CM

## 2019-11-15 DIAGNOSIS — I89.0 LYMPHEDEMA OF LEFT LEG: ICD-10-CM

## 2019-11-15 DIAGNOSIS — R10.2 VAGINAL PAIN: ICD-10-CM

## 2019-11-15 DIAGNOSIS — L90.0 LICHEN SCLEROSUS ET ATROPHICUS: ICD-10-CM

## 2019-11-15 PROCEDURE — 99214 OFFICE O/P EST MOD 30 MIN: CPT | Performed by: NURSE PRACTITIONER

## 2019-11-15 NOTE — PROGRESS NOTES
"GYN ONCOLOGY CANCER SURVEILLANCE FOLLOW-UP    Edyta Franco  [id number]  1970    Chief Complaint: Follow-up (stabbing pain in vagina)        History of present illness:  Edyta Franco is a 49 y.o. year old female who is here today for ongoing surveillance of Vulvar Cancer, see Cancer History. She continues to see Dr. Abigail Fong for general gynecologic care.  Upon arrival patient c/o recurrent \"stabbing\" vaginal pain. Rates pain 2/10. She was treated previously with pelvic floor PT at [facility] and this was very beneficial. She underwent physical therapy treatments for lymphedema around the same time. She is desiring of resuming physical therapy for symptom management.   Otherwise, patient is feeling generally well. She denies vaginal bleeding, new concerning lesions, groin swelling, or changes in bowel or bladder function. She uses clobetasol twice weekly for suppression of symptomatic lichen sclerosus with very close monitoring. She keeps topical Silvadene to use PRN excoriation. She also continues her PO Premarin and vaginal Estrace cream.        Cancer History:      Vulvar cancer, carcinoma (CMS/HCC)    8/14/2017 Biopsy     History of lichens sclerosus, on clobetasol therapy. Pt presented to Dr. Abigail Fong with c/o painful ulcerative left vulvar lesion. Biopsy revealed invasive SCC, extending into deep margin at least 3.5 mm. Referred to Gyn Oncology         8/30/2017 Surgery     Modified radical vulvectomy, left inguinofemoral sentinel lymph node dissection, right inguinofemoral lymph node dissection, and mapping biopsies of the vulva. Surgery by Dr. Nassar and Dr. Bree Russell.     Pathology revealed SCC, focally keratinizing with 0.3 cm invasion. LVSI negative, lymph nodes negative, mapping biopsies negative. Stage IB grade 2           9/24/2017 - 9/27/2017 Other Event     Inpatient hospital admission for inguinal incision cellulitis, inguinal fluid collection, and SIRS. Treated " with IV antibiotics and sent home on oral antibiotics.          10/26/2017 - 10/27/2017 Other Event     2 day inpatient admission for recurrent cellulitis, resolved with antibiotic management            Past Medical History:   Diagnosis Date   • Acute upper respiratory infection    • Bronchitis    • Cough    • Epilepsy (CMS/HCC)    • Fatigue    • Fever    • Hypercholesteremia    • Hypertension    • Influenza    • Lichen sclerosus of female genitalia    • Ovarian follicular cyst    • Sinusitis    • Sore throat    • UTI (urinary tract infection) 10/2/2017   • Vulva cancer (CMS/HCC)        Past Surgical History:   Procedure Laterality Date   • ANKLE SURGERY     •  SECTION     • HAND SURGERY Right    • HYSTERECTOMY     • LUMBAR LAMINECTOMY  2011    HEMILAMI RT-C7-T1, RT C7-T1 MEDIAL FACET & CT-T1 DISC (Dr. Cabrera   • VULVECTOMY N/A 2017    Procedure: MODIFIED RADICAL VULVECTOMY, BILATERAL SENTINAL LYMPH NODE DISSECTION, MAPPING BIOPSIES OF VULVA;  Surgeon: Isabela Nassar MD;  Location: [facility];  Service:        MEDICATIONS: The current medication list was reviewed and reconciled.     Allergies:  is allergic to penicillins.    Family History   Problem Relation Age of Onset   • Diabetes Mother    • Congenital heart disease Mother    • Hypertension Mother    • Stroke Mother    • Diabetes Father    • Congenital heart disease Father    • Hypertension Father    • Heart attack Other    • Hyperlipidemia Other    • Cancer Neg Hx        Last imaging study was CT 10/26/2017.     Review of Systems   Constitutional: Negative for appetite change, chills, fatigue, fever and unexpected weight change.   Respiratory: Negative for cough, shortness of breath and wheezing.    Cardiovascular: Positive for leg swelling (right hip/leg lymphedema). Negative for chest pain and palpitations.   Gastrointestinal: Negative for abdominal distention, abdominal pain, blood in stool, constipation, diarrhea, nausea and  vomiting.   Endocrine: Negative.    Genitourinary: Positive for vaginal pain (see HPI). Negative for dyspareunia, dysuria, frequency, genital sores, hematuria, pelvic pain, urgency, vaginal bleeding and vaginal discharge.   Musculoskeletal: Negative for arthralgias, gait problem and joint swelling.   Neurological: Negative for dizziness, seizures, syncope, weakness, light-headedness, numbness and headaches.   Hematological: Negative for adenopathy.   Psychiatric/Behavioral: Negative.        Physical Exam  Vital Signs: /72   Pulse 73   Resp 10   Wt 72.1 kg (159 lb)   BMI 30.04 kg/m²   Pain Score    11/15/19 0917   PainSc:   2      General Appearance:  alert, cooperative, no apparent distress and appears stated age   Neurologic/Psychiatric: A&O x 3, gait steady, appropriate affect   HEENT:  Normocephalic, without obvious abnormality, mucous membranes moist   Lungs:   Clear to auscultation bilaterally; respirations regular, even, and unlabored bilaterally   Heart:  Regular rate and rhythm, no murmurs appreciated   Breasts:  deferred   Abdomen:   Soft, non-tender, non-distended and no organomegaly   Lymph nodes: No inguinal adenopathy noted   Extremities: Normal, atraumatic; no clubbing, cyanosis. Mild RLE lymphedema noted   Pelvic: External Genitalia  changes consistent with prior excision, diffuse lichen sclerosus changes with labial fusion. no new or concerning lesions, no excoriation. Tissues stable  Vagina  is pink, moist, without lesions.  and small introitus  Vaginal Cuff  Female Vaginal Cuff: smooth, intact and without visible lesions. Pap obtained  Uterus  surgically absent  Ovaries  without palpable masses or fullness  Parametria  smooth  Rectovaginal  Female rectovaginal: deferred     ECOG Performance Status: 0 - Asymptomatic    Procedure Note:  No notes on file      Assessment and Plan:  Edyta was seen today for follow-up.    Diagnoses and all orders for this visit:    Vulvar cancer, carcinoma  (CMS/[facility])  -     Liquid-based Pap Smear, Diagnostic    Vaginal pain  -     Ambulatory Referral to Physical Therapy Lymphedema, Pelvic Floor  -     Liquid-based Pap Smear, Diagnostic    Lymphedema of left leg  -     Ambulatory Referral to Physical Therapy Lymphedema, Pelvic Floor    Lichen sclerosus et atrophicus  -     Liquid-based Pap Smear, Diagnostic          There is no evidence of disease upon today's exam. There are no concerning lesions that require biopsy and no other evidence of recurrence. She is now over 2 years out from completion of treatment with surgery alone. Pending negative pap smear, she may now go to every 6 month cancer surveillance visits. These may be alternated between gyn oncology and general gynecology. Patient will see Dr. Fong next for her annual exam and follow-up. She is understanding to call with any changes in pelvic symptoms or general GYN concerns at any time between regularly scheduled visits.     Referral back to physical therapy ordered today for PT management of vaginal pain and lymphedema.     Continue current topical regimen.       Return to clinic in 1 year for ongoing cancer surveillance or sooner PRN problems.      Yamile Torres, ISAIAH

## 2019-12-11 ENCOUNTER — TRANSCRIBE ORDERS (OUTPATIENT)
Dept: ADMINISTRATIVE | Facility: HOSPITAL | Age: 49
End: 2019-12-11

## 2019-12-11 DIAGNOSIS — Z12.31 VISIT FOR SCREENING MAMMOGRAM: Primary | ICD-10-CM

## 2019-12-13 ENCOUNTER — HOSPITAL ENCOUNTER (OUTPATIENT)
Dept: MAMMOGRAPHY | Facility: HOSPITAL | Age: 49
Discharge: HOME | End: 2019-12-13
Admitting: INTERNAL MEDICINE

## 2019-12-13 DIAGNOSIS — Z12.31 VISIT FOR SCREENING MAMMOGRAM: ICD-10-CM

## 2019-12-13 PROCEDURE — 77067 SCR MAMMO BI INCL CAD: CPT

## 2019-12-13 PROCEDURE — 77067 SCR MAMMO BI INCL CAD: CPT | Performed by: RADIOLOGY

## 2019-12-13 PROCEDURE — 77063 BREAST TOMOSYNTHESIS BI: CPT

## 2019-12-13 PROCEDURE — 77063 BREAST TOMOSYNTHESIS BI: CPT | Performed by: RADIOLOGY

## 2020-01-24 ENCOUNTER — TELEPHONE (OUTPATIENT)
Dept: GYNECOLOGIC ONCOLOGY | Facility: CLINIC | Age: 50
End: 2020-01-24

## 2020-01-24 NOTE — TELEPHONE ENCOUNTER
[facility] requesting a call back to verify that you received the fax for record request for this patient.    [facility] Phone: [phone]

## 2020-02-10 DIAGNOSIS — E78.01 FAMILIAL HYPERCHOLESTEROLEMIA: ICD-10-CM

## 2020-02-10 RX ORDER — ROSUVASTATIN CALCIUM 40 MG/1
TABLET, COATED ORAL
Qty: 90 TABLET | Refills: 0 | Status: SHIPPED | OUTPATIENT
Start: 2020-02-10 | End: 2020-06-23

## 2020-02-25 ENCOUNTER — OFFICE VISIT (OUTPATIENT)
Dept: FAMILY MEDICINE CLINIC | Facility: CLINIC | Age: 50
End: 2020-02-25

## 2020-02-25 VITALS
HEART RATE: 68 BPM | TEMPERATURE: 97.9 F | SYSTOLIC BLOOD PRESSURE: 130 MMHG | HEIGHT: 61 IN | OXYGEN SATURATION: 99 % | WEIGHT: 170 LBS | DIASTOLIC BLOOD PRESSURE: 70 MMHG | BODY MASS INDEX: 32.1 KG/M2

## 2020-02-25 DIAGNOSIS — I87.2 VENOUS INSUFFICIENCY OF BOTH LOWER EXTREMITIES: ICD-10-CM

## 2020-02-25 DIAGNOSIS — E78.2 MIXED DYSLIPIDEMIA: ICD-10-CM

## 2020-02-25 DIAGNOSIS — I10 HYPERTENSION, ESSENTIAL, BENIGN: ICD-10-CM

## 2020-02-25 DIAGNOSIS — E78.01 FAMILIAL HYPERCHOLESTEROLEMIA: ICD-10-CM

## 2020-02-25 DIAGNOSIS — K92.1 HEMATOCHEZIA: ICD-10-CM

## 2020-02-25 DIAGNOSIS — Z00.00 ROUTINE GENERAL MEDICAL EXAMINATION AT A HEALTH CARE FACILITY: Primary | ICD-10-CM

## 2020-02-25 DIAGNOSIS — Z12.11 SCREEN FOR COLON CANCER: ICD-10-CM

## 2020-02-25 PROCEDURE — 99396 PREV VISIT EST AGE 40-64: CPT | Performed by: FAMILY MEDICINE

## 2020-02-25 RX ORDER — LISINOPRIL AND HYDROCHLOROTHIAZIDE 12.5; 1 MG/1; MG/1
1 TABLET ORAL DAILY
Qty: 90 TABLET | Refills: 1 | Status: CANCELLED | OUTPATIENT
Start: 2020-02-25

## 2020-02-25 RX ORDER — FENOFIBRATE 54 MG/1
54 TABLET ORAL DAILY
Qty: 90 TABLET | Refills: 1 | Status: SHIPPED | OUTPATIENT
Start: 2020-02-25 | End: 2020-10-09

## 2020-02-25 RX ORDER — ROSUVASTATIN CALCIUM 40 MG/1
40 TABLET, COATED ORAL
Qty: 90 TABLET | Refills: 0 | Status: CANCELLED | OUTPATIENT
Start: 2020-02-25

## 2020-02-25 RX ORDER — TORSEMIDE 10 MG/1
10 TABLET ORAL DAILY
Qty: 30 TABLET | Refills: 0 | Status: SHIPPED | OUTPATIENT
Start: 2020-02-25 | End: 2020-03-24

## 2020-02-25 RX ORDER — LISINOPRIL 10 MG/1
10 TABLET ORAL DAILY
Qty: 90 TABLET | Refills: 3 | Status: SHIPPED | OUTPATIENT
Start: 2020-02-25 | End: 2021-03-03

## 2020-02-26 LAB
ALBUMIN SERPL-MCNC: 4.4 G/DL (ref 3.5–5.2)
ALBUMIN/GLOB SERPL: 1.7 G/DL
ALP SERPL-CCNC: 51 U/L (ref 39–117)
ALT SERPL-CCNC: 16 U/L (ref 1–33)
AST SERPL-CCNC: 12 U/L (ref 1–32)
BASOPHILS # BLD AUTO: 0.06 10*3/MM3 (ref 0–0.2)
BASOPHILS NFR BLD AUTO: 0.9 % (ref 0–1.5)
BILIRUB SERPL-MCNC: 0.3 MG/DL (ref 0.2–1.2)
BUN SERPL-MCNC: 10 MG/DL (ref 6–20)
BUN/CREAT SERPL: 10.6 (ref 7–25)
CALCIUM SERPL-MCNC: 9.2 MG/DL (ref 8.6–10.5)
CHLORIDE SERPL-SCNC: 96 MMOL/L (ref 98–107)
CO2 SERPL-SCNC: 28.5 MMOL/L (ref 22–29)
CREAT SERPL-MCNC: 0.94 MG/DL (ref 0.57–1)
EOSINOPHIL # BLD AUTO: 0.1 10*3/MM3 (ref 0–0.4)
EOSINOPHIL NFR BLD AUTO: 1.5 % (ref 0.3–6.2)
ERYTHROCYTE [DISTWIDTH] IN BLOOD BY AUTOMATED COUNT: 12.3 % (ref 12.3–15.4)
GLOBULIN SER CALC-MCNC: 2.6 GM/DL
GLUCOSE SERPL-MCNC: 97 MG/DL (ref 65–99)
HCT VFR BLD AUTO: 40.9 % (ref 34–46.6)
HGB BLD-MCNC: 13.9 G/DL (ref 12–15.9)
IMM GRANULOCYTES # BLD AUTO: 0.02 10*3/MM3 (ref 0–0.05)
IMM GRANULOCYTES NFR BLD AUTO: 0.3 % (ref 0–0.5)
LYMPHOCYTES # BLD AUTO: 1.73 10*3/MM3 (ref 0.7–3.1)
LYMPHOCYTES NFR BLD AUTO: 25.3 % (ref 19.6–45.3)
MCH RBC QN AUTO: 30.4 PG (ref 26.6–33)
MCHC RBC AUTO-ENTMCNC: 34 G/DL (ref 31.5–35.7)
MCV RBC AUTO: 89.5 FL (ref 79–97)
MONOCYTES # BLD AUTO: 0.43 10*3/MM3 (ref 0.1–0.9)
MONOCYTES NFR BLD AUTO: 6.3 % (ref 5–12)
NEUTROPHILS # BLD AUTO: 4.51 10*3/MM3 (ref 1.7–7)
NEUTROPHILS NFR BLD AUTO: 65.7 % (ref 42.7–76)
NRBC BLD AUTO-RTO: 0 /100 WBC (ref 0–0.2)
PLATELET # BLD AUTO: 251 10*3/MM3 (ref 140–450)
POTASSIUM SERPL-SCNC: 4 MMOL/L (ref 3.5–5.2)
PROT SERPL-MCNC: 7 G/DL (ref 6–8.5)
RBC # BLD AUTO: 4.57 10*6/MM3 (ref 3.77–5.28)
SODIUM SERPL-SCNC: 138 MMOL/L (ref 136–145)
T4 FREE SERPL-MCNC: 1.06 NG/DL (ref 0.93–1.7)
TSH SERPL DL<=0.005 MIU/L-ACNC: 2.2 UIU/ML (ref 0.27–4.2)
WBC # BLD AUTO: 6.85 10*3/MM3 (ref 3.4–10.8)

## 2020-03-01 ENCOUNTER — HOSPITAL ENCOUNTER (EMERGENCY)
Facility: HOSPITAL | Age: 50
Discharge: HOME | End: 2020-03-01
Attending: EMERGENCY MEDICINE | Admitting: EMERGENCY MEDICINE

## 2020-03-01 ENCOUNTER — APPOINTMENT (OUTPATIENT)
Dept: GENERAL RADIOLOGY | Facility: HOSPITAL | Age: 50
End: 2020-03-01

## 2020-03-01 VITALS
SYSTOLIC BLOOD PRESSURE: 153 MMHG | BODY MASS INDEX: 31.15 KG/M2 | OXYGEN SATURATION: 97 % | WEIGHT: 165 LBS | HEART RATE: 59 BPM | TEMPERATURE: 98.1 F | DIASTOLIC BLOOD PRESSURE: 93 MMHG | HEIGHT: 61 IN | RESPIRATION RATE: 16 BRPM

## 2020-03-01 DIAGNOSIS — S61.012A LACERATION OF LEFT THUMB WITHOUT FOREIGN BODY WITHOUT DAMAGE TO NAIL, INITIAL ENCOUNTER: Primary | ICD-10-CM

## 2020-03-01 PROCEDURE — 73130 X-RAY EXAM OF HAND: CPT

## 2020-03-01 PROCEDURE — 90715 TDAP VACCINE 7 YRS/> IM: CPT | Performed by: PHYSICIAN ASSISTANT

## 2020-03-01 PROCEDURE — 90471 IMMUNIZATION ADMIN: CPT | Performed by: PHYSICIAN ASSISTANT

## 2020-03-01 PROCEDURE — 99283 EMERGENCY DEPT VISIT LOW MDM: CPT

## 2020-03-01 PROCEDURE — 25010000003 LIDOCAINE 1 % SOLUTION: Performed by: PHYSICIAN ASSISTANT

## 2020-03-01 PROCEDURE — 25010000002 TDAP 5-2.5-18.5 LF-MCG/0.5 SUSPENSION: Performed by: PHYSICIAN ASSISTANT

## 2020-03-01 RX ORDER — LIDOCAINE HYDROCHLORIDE 10 MG/ML
10 INJECTION, SOLUTION INFILTRATION; PERINEURAL ONCE
Status: COMPLETED | OUTPATIENT
Start: 2020-03-01 | End: 2020-03-01

## 2020-03-01 RX ADMIN — LIDOCAINE HYDROCHLORIDE 10 ML: 10 INJECTION, SOLUTION INFILTRATION; PERINEURAL at 18:52

## 2020-03-01 RX ADMIN — TETANUS TOXOID, REDUCED DIPHTHERIA TOXOID AND ACELLULAR PERTUSSIS VACCINE, ADSORBED 0.5 ML: 5; 2.5; 8; 8; 2.5 SUSPENSION INTRAMUSCULAR at 18:08

## 2020-03-01 NOTE — ED PROVIDER NOTES
Subjective   Patient states she was holding a 9 mm pistol in her left hand and when she pulled the trigger the slide came back and struck the dorsal aspect of her left thumb over the MCP.  Her tetanus shot has been updated.  Good range of motion against resistance in extension of the left thumb.  Bleeding controlled at this time.          Review of Systems   Constitutional: Negative.    HENT: Negative.    Eyes: Negative.    Respiratory: Negative.    Cardiovascular: Negative.    Gastrointestinal: Negative.    Genitourinary: Negative.    Musculoskeletal: Negative.    Skin:        Laceration to left thumb     Neurological: Negative.    Psychiatric/Behavioral: Negative.        Past Medical History:   Diagnosis Date   • Acute upper respiratory infection    • Bronchitis    • Cough    • Epilepsy (CMS/HCC)    • Fatigue    • Fever    • Hypercholesteremia    • Hypertension    • Influenza    • Lichen sclerosus of female genitalia    • Ovarian follicular cyst    • Sinusitis    • Sore throat    • UTI (urinary tract infection) 10/2/2017   • Vulva cancer (CMS/HCC)        Allergies   Allergen Reactions   • Penicillins Hives       Past Surgical History:   Procedure Laterality Date   • ANKLE SURGERY     •  SECTION     • HAND SURGERY Right    • HYSTERECTOMY      age 31   • LUMBAR LAMINECTOMY  2011    HEMILAMI RT-C7-T1, RT C7-T1 MEDIAL FACET & CT-T1 DISC (Dr. Cabrera   • OOPHORECTOMY     • VULVECTOMY N/A 2017    Procedure: MODIFIED RADICAL VULVECTOMY, BILATERAL SENTINAL LYMPH NODE DISSECTION, MAPPING BIOPSIES OF VULVA;  Surgeon: Isabela Nassar MD;  Location: [facility];  Service:        Family History   Problem Relation Age of Onset   • Diabetes Mother    • Congenital heart disease Mother    • Hypertension Mother    • Stroke Mother    • Hyperlipidemia Mother    • Diabetes Father    • Congenital heart disease Father    • Hypertension Father    • Hyperlipidemia Father    • Heart attack Other    • Hyperlipidemia  Other    • Cancer Neg Hx    • Breast cancer Neg Hx        Social History     Socioeconomic History   • Marital status:      Spouse name: Not on file   • Number of children: 2   • Years of education: Not on file   • Highest education level: Not on file   Tobacco Use   • Smoking status: Never Smoker   • Smokeless tobacco: Never Used   Substance and Sexual Activity   • Alcohol use: Yes     Comment: occ.    • Drug use: No   • Sexual activity: Defer           Objective   Physical Exam   Constitutional: She appears well-developed and well-nourished.   HENT:   Head: Normocephalic and atraumatic.   Neck: Normal range of motion.   Cardiovascular: Normal rate.   Pulmonary/Chest: Effort normal.   Musculoskeletal: Normal range of motion.   Good strength and extension against resistance at the MCP and IP joint of the left thumb.  No signs of tendon injury.   Neurological: She is alert.   Skin: Skin is warm and dry.   2.5 centimeters laceration to the dorsal aspect of the MCP of the left thumb.  Bleeding controlled this time.   Psychiatric: She has a normal mood and affect. Her behavior is normal.   Nursing note and vitals reviewed.      Laceration Repair  Date/Time: 3/1/2020 7:00 PM  Performed by: Evans Gudino PA-C  Authorized by: Cameron De Leon DO     Consent:     Consent obtained:  Verbal    Consent given by:  Patient    Risks discussed:  Infection, pain and poor cosmetic result    Alternatives discussed:  No treatment  Anesthesia (see MAR for exact dosages):     Anesthesia method:  Local infiltration    Local anesthetic:  Lidocaine 1% w/o epi  Laceration details:     Location:  Finger    Finger location:  L thumb    Length (cm):  2.5  Pre-procedure details:     Preparation:  Patient was prepped and draped in usual sterile fashion  Exploration:     Hemostasis achieved with:  Direct pressure    Wound exploration: wound explored through full range of motion      Wound extent: no tendon damage noted     Treatment:     Area cleansed with:  Hibiclens    Amount of cleaning:  Extensive    Irrigation solution:  Sterile saline    Visualized foreign bodies/material removed: no    Skin repair:     Repair method:  Sutures    Suture size:  5-0    Suture material:  Nylon    Suture technique:  Running locked    Number of sutures:  4  Approximation:     Approximation:  Close  Post-procedure details:     Dressing:  Open (no dressing)               ED Course                                           MDM    Final diagnoses:   Laceration of left thumb without foreign body without damage to nail, initial encounter            Evans Gudino PA-C  03/01/20 3425

## 2020-03-13 ENCOUNTER — OFFICE VISIT (OUTPATIENT)
Dept: FAMILY MEDICINE CLINIC | Facility: CLINIC | Age: 50
End: 2020-03-13

## 2020-03-13 VITALS
BODY MASS INDEX: 31.72 KG/M2 | WEIGHT: 168 LBS | OXYGEN SATURATION: 99 % | HEIGHT: 61 IN | DIASTOLIC BLOOD PRESSURE: 70 MMHG | TEMPERATURE: 97.6 F | HEART RATE: 67 BPM | SYSTOLIC BLOOD PRESSURE: 120 MMHG

## 2020-03-13 DIAGNOSIS — S60.419A INFECTED ABRASION OF FINGER OF LEFT HAND, INITIAL ENCOUNTER: Primary | ICD-10-CM

## 2020-03-13 DIAGNOSIS — L08.9 INFECTED ABRASION OF FINGER OF LEFT HAND, INITIAL ENCOUNTER: Primary | ICD-10-CM

## 2020-03-13 PROCEDURE — 99213 OFFICE O/P EST LOW 20 MIN: CPT | Performed by: FAMILY MEDICINE

## 2020-03-13 PROCEDURE — 96372 THER/PROPH/DIAG INJ SC/IM: CPT | Performed by: FAMILY MEDICINE

## 2020-03-13 RX ORDER — KETOROLAC TROMETHAMINE 30 MG/ML
15 INJECTION, SOLUTION INTRAMUSCULAR; INTRAVENOUS ONCE
Status: COMPLETED | OUTPATIENT
Start: 2020-03-13 | End: 2020-03-13

## 2020-03-13 RX ORDER — CLINDAMYCIN HYDROCHLORIDE 150 MG/1
150 CAPSULE ORAL
Qty: 30 CAPSULE | Refills: 0 | Status: SHIPPED | OUTPATIENT
Start: 2020-03-13 | End: 2020-03-23

## 2020-03-13 RX ADMIN — KETOROLAC TROMETHAMINE 15 MG: 30 INJECTION, SOLUTION INTRAMUSCULAR; INTRAVENOUS at 15:37

## 2020-03-13 NOTE — PROGRESS NOTES
Established Patient        Chief Complaint:   Chief Complaint   Patient presents with   • Chills     x since last PM; dizziness x 1 episode         Edyta Franco is a 49 y.o. female    History of Present Illness:   Presenting today with complaints of chills, that began yesterday.  She had a recent laceration to her left hand, this required suture closure and anatomical restoration.  Sutures were removed, the area has become erythematous and slightly painful in nature.  She does have a history of sepsis in the past, she is concerned about the possibility of impending and developing infection.  She denies any focal fever however.  She has had some feelings of periodic dizziness is one episode, she attributes this to decreased p.o. intake.  Denies any seizure-like activity.  No complaints of chest pain or any shortness of breath.  No dysuria or hematuria.    Subjective     The following portions of the patient's history were reviewed and updated as appropriate: allergies, current medications, past family history, past medical history, past social history, past surgical history and problem list.    Allergies   Allergen Reactions   • Penicillins Hives       Review of Systems  Constitutional: Negative for fever.  Positive for chills.  Without diaphoresis, fatigue and unexpected weight change.   HENT: No dysphagia; no changes to vision/hearing/smell/taste; no epistaxis  Eyes: Negative for redness and visual disturbance.   Respiratory: negative for shortness of breath. Negative for chest pain . Negative for cough and chest tightness.   Cardiovascular: Negative for chest pain and palpitations.   Gastrointestinal: Negative for abdominal distention, abdominal pain and blood in stool.   Endocrine: Negative for cold intolerance and heat intolerance.   Genitourinary: Negative for difficulty urinating, dysuria and frequency.   Musculoskeletal: Negative for arthralgias, back pain and myalgias.   Skin: As per above to the  "left hand.  Neurological: Negative for syncope, weakness and headaches.   Hematological: Negative for adenopathy. Does not bruise/bleed easily.   Psychiatric/Behavioral: Negative for confusion. The patient is not nervous/anxious.    Objective     Physical Exam   Vital Signs: /70   Pulse 67   Temp 97.6 °F (36.4 °C)   Ht 154.9 cm (61\")   Wt 76.2 kg (168 lb)   SpO2 99%   BMI 31.74 kg/m²     General Appearance: alert, oriented x 3, no acute distress.  Skin: warm and dry.  Locally erythematous changes surrounding previous laceration site of the left hand, area not healed, eschar overlying.  No streaking erythema, joint movement preserved.  HEENT: Atraumatic.  pupils round and reactive to light and accommodation, oral mucosa pink and moist.  Nares patent without epistaxis.  External auditory canals are patent tympanic membranes intact.  Neck: supple, no JVD, trachea midline.  No thyromegaly  Lungs: CTA, unlabored breathing effort.  Heart: RRR, normal S1 and S2, no S3, no rub.  Abdomen: soft, non-tender, no palpable bladder, present bowel sounds to auscultation ×4.  No guarding or rigidity.  Extremities: no clubbing, cyanosis or edema.  Good range of motion actively and passively.  Symmetric muscle strength and development  Neuro: normal speech and mental status.  Cranial nerves II through XII intact.  No anosmia. DTR 2+; proprioception intact.  No focal motor/sensory deficits.    Assessment and Plan      Assessment:   Edyta was seen today for chills.    Diagnoses and all orders for this visit:    Infected abrasion of finger of left hand, initial encounter  -     clindamycin (CLEOCIN) 150 MG capsule; Take 1 capsule by mouth 3 (Three) Times a Day With Meals for 10 days.  -     ketorolac (TORADOL) injection 15 mg        Plan:  Topical bacitracin samples provided in office today; will apply bid; clindamycin TID with food for 10 days; will follow clinically.  Should she not improve, or worsen, she is notify the " office immediately or return to the clinic sooner than a scheduled follow-up visit.    Due to the inflammatory changes, have elected to utilize ketorolac 15 mg IM injection today.  Discussion Summary:    Discussed plan of care in detail with pt today; pt verb understanding and agrees.  Follow up:  No follow-ups on file.     There are no Patient Instructions on file for this visit.    Bill Albrecht DO  03/25/20  10:04          Please note that portions of this note may have been completed with a voice recognition program. Efforts were made to edit the dictations, but occasionally words are mistranscribed.

## 2020-03-20 ENCOUNTER — OFFICE VISIT (OUTPATIENT)
Dept: SURGERY | Facility: CLINIC | Age: 50
End: 2020-03-20

## 2020-03-20 VITALS
HEIGHT: 61 IN | OXYGEN SATURATION: 99 % | WEIGHT: 168 LBS | BODY MASS INDEX: 31.72 KG/M2 | SYSTOLIC BLOOD PRESSURE: 120 MMHG | RESPIRATION RATE: 16 BRPM | TEMPERATURE: 97.4 F | HEART RATE: 95 BPM | DIASTOLIC BLOOD PRESSURE: 74 MMHG

## 2020-03-20 DIAGNOSIS — K62.5 BRIGHT RED RECTAL BLEEDING: Primary | ICD-10-CM

## 2020-03-20 PROCEDURE — 99203 OFFICE O/P NEW LOW 30 MIN: CPT | Performed by: SURGERY

## 2020-03-20 RX ORDER — SODIUM CHLORIDE 0.9 % (FLUSH) 0.9 %
3 SYRINGE (ML) INJECTION EVERY 12 HOURS SCHEDULED
Status: CANCELLED | OUTPATIENT
Start: 2020-03-23

## 2020-03-20 RX ORDER — SODIUM CHLORIDE 0.9 % (FLUSH) 0.9 %
10 SYRINGE (ML) INJECTION AS NEEDED
Status: CANCELLED | OUTPATIENT
Start: 2020-03-23

## 2020-03-20 RX ORDER — SODIUM CHLORIDE, SODIUM LACTATE, POTASSIUM CHLORIDE, CALCIUM CHLORIDE 600; 310; 30; 20 MG/100ML; MG/100ML; MG/100ML; MG/100ML
50 INJECTION, SOLUTION INTRAVENOUS CONTINUOUS
Status: CANCELLED | OUTPATIENT
Start: 2020-03-23

## 2020-03-20 NOTE — PROGRESS NOTES
Subjective   Edyta Franco is a 49 y.o. female.   Chief Complaint   Patient presents with   • Rectal Bleeding     History of Present Illness   Ms. Franco is a 49-year-old female patient who comes the office for evaluation of rectal bleeding.  She reports that this is been ongoing intermittently for many years but recently has significantly worsened.  She complains of chronic diarrhea as well.  This is not new.  Her last colonoscopy was 20 years ago.  She also complains of rectal discomfort.  She has a personal history of vulvar cancer treated in 2017.  There is no family history of colon cancer or underlying inflammatory bowel disease.  She denies abdominal pain or weight loss.        The following portions of the patient's history were reviewed and updated as appropriate: allergies, current medications, past family history, past medical history, past social history, past surgical history and problem list.      Patient Active Problem List   Diagnosis   • Acute bronchitis   • Fatigue   • BP (high blood pressure)   • HLD (hyperlipidemia)   • High blood triglycerides   • Asian flu   • Myoclonic seizure (CMS/HCC)   • Sore throat   • Hypertension, essential, benign   • Familial hypercholesterolemia   • Fibromyalgia   • Flu vaccine need   • BMI 30.0-30.9,adult   • Venous insufficiency of both lower extremities   • Palpitations   • Epilepsy (CMS/HCC)   • Vulvar cancer, carcinoma (CMS/HCC)   • Lichen sclerosus et atrophicus   • Erysipelas   • UTI (urinary tract infection)   • Wound infection   • Nausea   • Cellulitis of groin, right       Past Medical History:   Diagnosis Date   • Acute upper respiratory infection    • Bronchitis    • Cough    • Epilepsy (CMS/HCC)    • Fatigue    • Fever    • Hypercholesteremia    • Hypertension    • Influenza    • Lichen sclerosus of female genitalia    • Ovarian follicular cyst    • Sinusitis    • Sore throat    • UTI (urinary tract infection) 10/2/2017   • Vulva cancer (CMS/HCC)         Past Surgical History:   Procedure Laterality Date   •  SECTION     • HAND SURGERY Right 2014   • LUMBAR LAMINECTOMY  2011    HEMILAMI RT-C7-T1, RT C7-T1 MEDIAL FACET & CT-T1 DISC (Dr. Cabrera   • ORIF ANKLE FRACTURE Right    • TOTAL ABDOMINAL HYSTERECTOMY WITH SALPINGO OOPHORECTOMY      Dr. Richard Armendariz - [facility]    • VULVECTOMY N/A 2017    Procedure: MODIFIED RADICAL VULVECTOMY, BILATERAL SENTINAL LYMPH NODE DISSECTION, MAPPING BIOPSIES OF VULVA;  Surgeon: sIabela Nassar MD;  Location: [facility];  Service:        Medications:     Current Outpatient Medications:   •  clindamycin (CLEOCIN) 150 MG capsule, Take 1 capsule by mouth 3 (Three) Times a Day With Meals for 10 days., Disp: 30 capsule, Rfl: 0  •  estradiol (ESTRACE) 0.1 MG/GM vaginal cream, , Disp: , Rfl:   •  estrogens, conjugated, (PREMARIN) 0.625 MG tablet, Take 1 tablet by mouth Daily., Disp: 30 tablet, Rfl: 0  •  fenofibrate (TRICOR) 54 MG tablet, Take 1 tablet by mouth Daily., Disp: 90 tablet, Rfl: 1  •  lisinopril (PRINIVIL,ZESTRIL) 10 MG tablet, Take 1 tablet by mouth Daily., Disp: 90 tablet, Rfl: 3  •  rosuvastatin (CRESTOR) 40 MG tablet, TAKE 1 TABLET BY MOUTH AT NIGHT, Disp: 90 tablet, Rfl: 0  •  torsemide (DEMADEX) 10 MG tablet, Take 1 tablet by mouth Daily., Disp: 30 tablet, Rfl: 0    Allergies:   Allergies   Allergen Reactions   • Penicillins Hives         Family History   Problem Relation Age of Onset   • Diabetes Mother    • Congenital heart disease Mother    • Hypertension Mother    • Stroke Mother    • Hyperlipidemia Mother    • Diabetes Father    • Congenital heart disease Father    • Hypertension Father    • Hyperlipidemia Father    • Heart attack Other    • Hyperlipidemia Other    • Cancer Neg Hx    • Breast cancer Neg Hx        Social History     Socioeconomic History   • Marital status:      Spouse name: Not on file   • Number of children: 2   • Years of education: Not on file   • Highest  "education level: Not on file   Tobacco Use   • Smoking status: Never Smoker   • Smokeless tobacco: Never Used   Substance and Sexual Activity   • Alcohol use: Yes     Comment: occ.    • Drug use: No   • Sexual activity: Defer       Review of Systems   Constitutional: Negative for chills, fever and unexpected weight change.   HENT: Negative for hearing loss, trouble swallowing and voice change.    Eyes: Negative for visual disturbance.   Respiratory: Negative for apnea, cough, chest tightness, shortness of breath and wheezing.    Cardiovascular: Negative for chest pain, palpitations and leg swelling.   Gastrointestinal: Positive for anal bleeding and diarrhea. Negative for abdominal distention, abdominal pain, blood in stool, constipation, nausea, rectal pain and vomiting.   Endocrine: Negative for cold intolerance and heat intolerance.   Genitourinary: Negative for difficulty urinating, dysuria and flank pain.   Musculoskeletal: Negative for back pain and gait problem.   Skin: Negative for color change, rash and wound.   Neurological: Negative for dizziness, syncope, speech difficulty, weakness, light-headedness, numbness and headaches.   Hematological: Negative for adenopathy. Does not bruise/bleed easily.   Psychiatric/Behavioral: Negative for confusion. The patient is not nervous/anxious.          Objective    /74   Pulse 95   Temp 97.4 °F (36.3 °C)   Resp 16   Ht 154.9 cm (61\")   Wt 76.2 kg (168 lb)   SpO2 99%   BMI 31.74 kg/m²     Physical Exam   Constitutional: She is oriented to person, place, and time. She appears well-developed and well-nourished.   HENT:   Head: Normocephalic and atraumatic.   Eyes: Pupils are equal, round, and reactive to light. EOM are normal. No scleral icterus.   Neck: Neck supple.   Cardiovascular: Regular rhythm.   Pulmonary/Chest: Effort normal.   Abdominal: Soft. She exhibits no distension. There is no tenderness.   Neurological: She is alert and oriented to person, " place, and time.   Skin: Skin is warm and dry.   Psychiatric: She has a normal mood and affect. Her behavior is normal.       Assessment/Plan   Edyta was seen today for rectal bleeding.    Diagnoses and all orders for this visit:    Bright red rectal bleeding  -     Case Request; Standing  -     Case Request    Other orders  -     Follow Anesthesia Guidelines / Standing Orders; Future  -     Provide NPO Instructions to Patient; Future  -     Chlorhexidine Skin Prep; Future        Given the patient's personal history of vulvar cancer at a young age, as well as the change in her bowel habits, and worsening rectal bleeding, I have recommended that we proceed with a colonoscopy.  We discussed colonoscopy for diagnostic purposes.  We discussed the indications for colonoscopy as well as the risks, benefits and alternatives to this procedure. Risks including but not limited to perforation, bleeding,need for blood transfusion or emergent surgery ,and missed neoplasm were reviewed in detail with the patient. The necessary bowel preparation and pre-procedure clear liquid diet was explained in detail.  A written instructional handout was also provided.  Electronic prescriptions for miralax and dulcolax were sent to the patient's pharmacy.  The patient was given an opportunity to ask questions.  The patient verbalized understanding of these recommendations and the plan of care. The patient is willing to proceed with colonoscopy and has signed informed consent in the office today.  My office will arrange scheduling for the colonoscopy procedure and pre-admission testing.

## 2020-03-23 ENCOUNTER — HOSPITAL ENCOUNTER (OUTPATIENT)
Facility: HOSPITAL | Age: 50
Setting detail: HOSPITAL OUTPATIENT SURGERY
Discharge: HOME | End: 2020-03-23
Attending: SURGERY | Admitting: SURGERY

## 2020-03-23 ENCOUNTER — ANESTHESIA EVENT (OUTPATIENT)
Dept: GASTROENTEROLOGY | Facility: HOSPITAL | Age: 50
End: 2020-03-23

## 2020-03-23 ENCOUNTER — ANESTHESIA (OUTPATIENT)
Dept: GASTROENTEROLOGY | Facility: HOSPITAL | Age: 50
End: 2020-03-23

## 2020-03-23 ENCOUNTER — TELEPHONE (OUTPATIENT)
Dept: SURGERY | Facility: CLINIC | Age: 50
End: 2020-03-23

## 2020-03-23 VITALS
SYSTOLIC BLOOD PRESSURE: 111 MMHG | WEIGHT: 164 LBS | TEMPERATURE: 98 F | RESPIRATION RATE: 16 BRPM | OXYGEN SATURATION: 98 % | HEIGHT: 61 IN | DIASTOLIC BLOOD PRESSURE: 72 MMHG | BODY MASS INDEX: 30.96 KG/M2 | HEART RATE: 62 BPM

## 2020-03-23 DIAGNOSIS — K62.5 BRIGHT RED RECTAL BLEEDING: ICD-10-CM

## 2020-03-23 PROCEDURE — 45380 COLONOSCOPY AND BIOPSY: CPT | Performed by: SURGERY

## 2020-03-23 PROCEDURE — 25010000002 PROPOFOL 200 MG/20ML EMULSION: Performed by: NURSE ANESTHETIST, CERTIFIED REGISTERED

## 2020-03-23 PROCEDURE — 25010000002 ONDANSETRON PER 1 MG: Performed by: NURSE ANESTHETIST, CERTIFIED REGISTERED

## 2020-03-23 RX ORDER — SODIUM CHLORIDE 0.9 % (FLUSH) 0.9 %
10 SYRINGE (ML) INJECTION AS NEEDED
Status: DISCONTINUED | OUTPATIENT
Start: 2020-03-23 | End: 2020-03-23 | Stop reason: HOSPADM

## 2020-03-23 RX ORDER — PROPOFOL 10 MG/ML
INJECTION, EMULSION INTRAVENOUS AS NEEDED
Status: DISCONTINUED | OUTPATIENT
Start: 2020-03-23 | End: 2020-03-23 | Stop reason: SURG

## 2020-03-23 RX ORDER — MAGNESIUM HYDROXIDE 1200 MG/15ML
LIQUID ORAL AS NEEDED
Status: DISCONTINUED | OUTPATIENT
Start: 2020-03-23 | End: 2020-03-23 | Stop reason: HOSPADM

## 2020-03-23 RX ORDER — LIDOCAINE HYDROCHLORIDE 20 MG/ML
INJECTION, SOLUTION INTRAVENOUS AS NEEDED
Status: DISCONTINUED | OUTPATIENT
Start: 2020-03-23 | End: 2020-03-23 | Stop reason: SURG

## 2020-03-23 RX ORDER — SODIUM CHLORIDE 0.9 % (FLUSH) 0.9 %
3 SYRINGE (ML) INJECTION EVERY 12 HOURS SCHEDULED
Status: DISCONTINUED | OUTPATIENT
Start: 2020-03-23 | End: 2020-03-23 | Stop reason: HOSPADM

## 2020-03-23 RX ORDER — HYDROCORTISONE ACETATE 25 MG/1
25 SUPPOSITORY RECTAL EVERY 12 HOURS
Qty: 24 EACH | Refills: 1 | Status: SHIPPED | OUTPATIENT
Start: 2020-03-23 | End: 2020-03-23 | Stop reason: HOSPADM

## 2020-03-23 RX ORDER — SODIUM CHLORIDE, SODIUM LACTATE, POTASSIUM CHLORIDE, CALCIUM CHLORIDE 600; 310; 30; 20 MG/100ML; MG/100ML; MG/100ML; MG/100ML
50 INJECTION, SOLUTION INTRAVENOUS CONTINUOUS
Status: DISCONTINUED | OUTPATIENT
Start: 2020-03-23 | End: 2020-03-23 | Stop reason: HOSPADM

## 2020-03-23 RX ORDER — ONDANSETRON 2 MG/ML
INJECTION INTRAMUSCULAR; INTRAVENOUS AS NEEDED
Status: DISCONTINUED | OUTPATIENT
Start: 2020-03-23 | End: 2020-03-23 | Stop reason: SURG

## 2020-03-23 RX ADMIN — PROPOFOL 50 MG: 10 INJECTION, EMULSION INTRAVENOUS at 08:00

## 2020-03-23 RX ADMIN — PROPOFOL 50 MG: 10 INJECTION, EMULSION INTRAVENOUS at 07:43

## 2020-03-23 RX ADMIN — PROPOFOL 50 MG: 10 INJECTION, EMULSION INTRAVENOUS at 08:04

## 2020-03-23 RX ADMIN — PROPOFOL 50 MG: 10 INJECTION, EMULSION INTRAVENOUS at 07:55

## 2020-03-23 RX ADMIN — LIDOCAINE HYDROCHLORIDE 60 MG: 20 INJECTION, SOLUTION INTRAVENOUS at 07:38

## 2020-03-23 RX ADMIN — PROPOFOL 100 MG: 10 INJECTION, EMULSION INTRAVENOUS at 07:38

## 2020-03-23 RX ADMIN — PROPOFOL 50 MG: 10 INJECTION, EMULSION INTRAVENOUS at 07:50

## 2020-03-23 RX ADMIN — SODIUM CHLORIDE, POTASSIUM CHLORIDE, SODIUM LACTATE AND CALCIUM CHLORIDE 50 ML/HR: 600; 310; 30; 20 INJECTION, SOLUTION INTRAVENOUS at 06:49

## 2020-03-23 RX ADMIN — ONDANSETRON 4 MG: 2 INJECTION INTRAMUSCULAR; INTRAVENOUS at 07:38

## 2020-03-23 NOTE — ANESTHESIA POSTPROCEDURE EVALUATION
Patient: Edyta Franco    Procedure Summary     Date:  03/23/20 Room / Location:  [facility] ENDOSCOPY 2 / [facility] ENDOSCOPY    Anesthesia Start:  0734 Anesthesia Stop:  0815    Procedure:  COLONOSCOPY WITH BIOPSY (N/A ) Diagnosis:       Bright red rectal bleeding      (Bright red rectal bleeding [K62.5])    Surgeon:  Eleni Russell MD Provider:  Flory Cartwright CRNA    Anesthesia Type:  MAC ASA Status:  3          Anesthesia Type: MAC    Vitals  Vitals Value Taken Time   /72 3/23/2020  8:44 AM   Temp 98 °F (36.7 °C) 3/23/2020  8:44 AM   Pulse 62 3/23/2020  8:44 AM   Resp 16 3/23/2020  8:14 AM   SpO2 98 % 3/23/2020  8:44 AM           Post Anesthesia Care and Evaluation    Patient location during evaluation: PHASE II  Patient participation: complete - patient participated  Level of consciousness: awake and alert  Pain score: 0  Pain management: satisfactory to patient  Airway patency: patent  Anesthetic complications: No anesthetic complications  PONV Status: none  Cardiovascular status: acceptable and stable  Respiratory status: acceptable  Hydration status: acceptable

## 2020-03-23 NOTE — TELEPHONE ENCOUNTER
Patient called and stated insurance would not approve suppositories. I called pharmacy and they stated they thought a topical cream would be approved. Please advise.

## 2020-03-23 NOTE — ANESTHESIA PREPROCEDURE EVALUATION
Anesthesia Evaluation     Patient summary reviewed and Nursing notes reviewed   NPO Solid Status: > 8 hours  NPO Liquid Status: > 8 hours           Airway   Mallampati: I  TM distance: >3 FB  Neck ROM: full  no difficulty expected  Dental - normal exam     Pulmonary - normal exam   (+) recent URI,   Cardiovascular - normal exam    (+) hypertension, hyperlipidemia,   PVD: VENOUS INSUFFICIENCY.      Neuro/Psych  (+) seizures,     GI/Hepatic/Renal/Endo - negative ROS     Musculoskeletal     Myalgias: FIBROMYALGIA.  Abdominal  - normal exam    Bowel sounds: normal.   Substance History - negative use     OB/GYN negative ob/gyn ROS         Other      history of cancer (VULVAR)                      Anesthesia Plan    ASA 3     MAC     intravenous induction     Anesthetic plan, all risks, benefits, and alternatives have been provided, discussed and informed consent has been obtained with: patient.    Plan discussed with CRNA.

## 2020-03-24 DIAGNOSIS — I87.2 VENOUS INSUFFICIENCY OF BOTH LOWER EXTREMITIES: ICD-10-CM

## 2020-03-24 DIAGNOSIS — I10 HYPERTENSION, ESSENTIAL, BENIGN: ICD-10-CM

## 2020-03-24 RX ORDER — TORSEMIDE 10 MG/1
TABLET ORAL
Qty: 30 TABLET | Refills: 0 | Status: SHIPPED | OUTPATIENT
Start: 2020-03-24 | End: 2020-04-30

## 2020-03-26 LAB
LAB AP CASE REPORT: NORMAL
PATH REPORT.FINAL DX SPEC: NORMAL

## 2020-04-02 NOTE — PROGRESS NOTES
You may inform the patient that all biopsies from her recent colonoscopy were normal.  There was no evidence of colitis to cause her diarrhea.  Additionally, the colon and small bowel looked completely normal.  She does have small internal hemorrhoids which certainly can cause intermittent painless rectal bleeding.  She may treat these with over-the-counter medication such as Preparation H, hydrocortisone cream, Tucks pads/witch hazel, etc.  I would not have her return to the office for now given the outbreak of COVID 19.  I am happy to see her back once the COVID 19 restrictions have been lifted or discussed with her further on the phone if she is having ongoing issues.  She will need a repeat colonoscopy for screening purposes in 10 years.

## 2020-04-30 DIAGNOSIS — I87.2 VENOUS INSUFFICIENCY OF BOTH LOWER EXTREMITIES: ICD-10-CM

## 2020-04-30 DIAGNOSIS — I10 HYPERTENSION, ESSENTIAL, BENIGN: ICD-10-CM

## 2020-04-30 RX ORDER — TORSEMIDE 10 MG/1
TABLET ORAL
Qty: 30 TABLET | Refills: 0 | Status: SHIPPED | OUTPATIENT
Start: 2020-04-30 | End: 2020-06-15

## 2020-06-13 DIAGNOSIS — I87.2 VENOUS INSUFFICIENCY OF BOTH LOWER EXTREMITIES: ICD-10-CM

## 2020-06-13 DIAGNOSIS — E78.01 FAMILIAL HYPERCHOLESTEROLEMIA: ICD-10-CM

## 2020-06-13 DIAGNOSIS — I10 HYPERTENSION, ESSENTIAL, BENIGN: ICD-10-CM

## 2020-06-15 RX ORDER — TORSEMIDE 10 MG/1
TABLET ORAL
Qty: 30 TABLET | Refills: 0 | Status: SHIPPED | OUTPATIENT
Start: 2020-06-15 | End: 2020-07-16

## 2020-06-16 RX ORDER — ROSUVASTATIN CALCIUM 40 MG/1
TABLET, COATED ORAL
Qty: 90 TABLET | Refills: 0 | OUTPATIENT
Start: 2020-06-16

## 2020-06-16 RX ORDER — EZETIMIBE 10 MG/1
TABLET ORAL
Qty: 90 TABLET | Refills: 0 | OUTPATIENT
Start: 2020-06-16

## 2020-06-22 DIAGNOSIS — E78.01 FAMILIAL HYPERCHOLESTEROLEMIA: ICD-10-CM

## 2020-06-23 RX ORDER — ROSUVASTATIN CALCIUM 40 MG/1
TABLET, COATED ORAL
Qty: 90 TABLET | Refills: 0 | Status: SHIPPED | OUTPATIENT
Start: 2020-06-23 | End: 2020-10-09

## 2020-07-16 DIAGNOSIS — I10 HYPERTENSION, ESSENTIAL, BENIGN: ICD-10-CM

## 2020-07-16 DIAGNOSIS — I87.2 VENOUS INSUFFICIENCY OF BOTH LOWER EXTREMITIES: ICD-10-CM

## 2020-07-16 RX ORDER — TORSEMIDE 10 MG/1
TABLET ORAL
Qty: 30 TABLET | Refills: 0 | Status: SHIPPED | OUTPATIENT
Start: 2020-07-16 | End: 2020-09-08

## 2020-07-29 ENCOUNTER — TELEPHONE (OUTPATIENT)
Dept: FAMILY MEDICINE CLINIC | Facility: CLINIC | Age: 50
End: 2020-07-29

## 2020-07-29 NOTE — TELEPHONE ENCOUNTER
Patient notified to go to [facility] per Dr Albrecht due to COVID symptoms

## 2020-07-29 NOTE — TELEPHONE ENCOUNTER
PATIENT HAS COUGH AND HER LIPS LOOKED SUNBURNT, THEY ARE HOPING YOU CAN SEE HER, A TELEHEALTH WOULD BE OK IF THAT'S THEIR ONLY OPTION    PATIENT  PH: [phone]

## 2020-09-08 DIAGNOSIS — I87.2 VENOUS INSUFFICIENCY OF BOTH LOWER EXTREMITIES: ICD-10-CM

## 2020-09-08 DIAGNOSIS — I10 HYPERTENSION, ESSENTIAL, BENIGN: ICD-10-CM

## 2020-09-08 RX ORDER — TORSEMIDE 10 MG/1
TABLET ORAL
Qty: 30 TABLET | Refills: 0 | Status: SHIPPED | OUTPATIENT
Start: 2020-09-08 | End: 2020-10-09

## 2020-10-09 DIAGNOSIS — I10 HYPERTENSION, ESSENTIAL, BENIGN: ICD-10-CM

## 2020-10-09 DIAGNOSIS — E78.01 FAMILIAL HYPERCHOLESTEROLEMIA: ICD-10-CM

## 2020-10-09 DIAGNOSIS — E78.2 MIXED DYSLIPIDEMIA: ICD-10-CM

## 2020-10-09 DIAGNOSIS — I87.2 VENOUS INSUFFICIENCY OF BOTH LOWER EXTREMITIES: ICD-10-CM

## 2020-10-09 RX ORDER — ROSUVASTATIN CALCIUM 40 MG/1
TABLET, COATED ORAL
Qty: 90 TABLET | Refills: 1 | Status: SHIPPED | OUTPATIENT
Start: 2020-10-09 | End: 2021-08-06

## 2020-10-09 RX ORDER — FENOFIBRATE 54 MG/1
TABLET ORAL
Qty: 90 TABLET | Refills: 1 | Status: SHIPPED | OUTPATIENT
Start: 2020-10-09 | End: 2021-07-15

## 2020-10-09 RX ORDER — TORSEMIDE 10 MG/1
TABLET ORAL
Qty: 30 TABLET | Refills: 1 | Status: SHIPPED | OUTPATIENT
Start: 2020-10-09 | End: 2021-01-14

## 2020-10-14 ENCOUNTER — TELEPHONE (OUTPATIENT)
Dept: GYNECOLOGIC ONCOLOGY | Facility: CLINIC | Age: 50
End: 2020-10-14

## 2020-10-14 NOTE — TELEPHONE ENCOUNTER
PT IS WANTING TO MAKE APT WITH DR ALAMO FOR A 1 YR FU AND AN APT OF SURVIVORSHIP WITH [facility] ,   PLEASE CALL PT BACK   PT [phone]  [facility] IS BEST FOR APT  PT STATES SHE ALSO HAVING ANOTHER ISSUE -PT STATES SHE HAS A NEW SORE SPOT, WANT TO GET CHECKED OUT.

## 2020-10-23 ENCOUNTER — OFFICE VISIT (OUTPATIENT)
Dept: GYNECOLOGIC ONCOLOGY | Facility: CLINIC | Age: 50
End: 2020-10-23

## 2020-10-23 VITALS
TEMPERATURE: 97.7 F | HEIGHT: 61 IN | RESPIRATION RATE: 17 BRPM | BODY MASS INDEX: 30.58 KG/M2 | DIASTOLIC BLOOD PRESSURE: 70 MMHG | WEIGHT: 162 LBS | SYSTOLIC BLOOD PRESSURE: 125 MMHG | OXYGEN SATURATION: 93 % | HEART RATE: 70 BPM

## 2020-10-23 DIAGNOSIS — C51.9 VULVAR CANCER, CARCINOMA (HCC): Primary | ICD-10-CM

## 2020-10-23 DIAGNOSIS — L90.0 LICHEN SCLEROSUS ET ATROPHICUS: ICD-10-CM

## 2020-10-23 PROCEDURE — 99213 OFFICE O/P EST LOW 20 MIN: CPT | Performed by: NURSE PRACTITIONER

## 2020-10-28 ENCOUNTER — OFFICE VISIT (OUTPATIENT)
Dept: FAMILY MEDICINE CLINIC | Facility: CLINIC | Age: 50
End: 2020-10-28

## 2020-10-28 VITALS
WEIGHT: 164 LBS | DIASTOLIC BLOOD PRESSURE: 60 MMHG | BODY MASS INDEX: 25.74 KG/M2 | HEIGHT: 67 IN | SYSTOLIC BLOOD PRESSURE: 108 MMHG | HEART RATE: 67 BPM | OXYGEN SATURATION: 98 %

## 2020-10-28 DIAGNOSIS — R53.83 FATIGUE, UNSPECIFIED TYPE: ICD-10-CM

## 2020-10-28 DIAGNOSIS — R00.1 BRADYCARDIA: Primary | ICD-10-CM

## 2020-10-28 DIAGNOSIS — R42 DIZZINESS: ICD-10-CM

## 2020-10-28 PROCEDURE — 93000 ELECTROCARDIOGRAM COMPLETE: CPT | Performed by: NURSE PRACTITIONER

## 2020-10-28 PROCEDURE — 99213 OFFICE O/P EST LOW 20 MIN: CPT | Performed by: NURSE PRACTITIONER

## 2020-10-28 NOTE — PROGRESS NOTES
GYN ONCOLOGY CANCER SURVEILLANCE FOLLOW-UP    Edyta Franco  [id number]  1970    Chief Complaint: Follow-up (possible new spot to right vulva)        History of present illness:  Edyta Franco is a 50 y.o. year old female who is here today for ongoing surveillance of Vulvar Cancer, see Cancer History. She also has a significant history of symptomatic lichen sclerosus. She has undergone numerous topical treatments and physical therapy for severe vaginal symptoms and risks. Upon arrival today she c/o new spot to right vulva she noticed 1 month ago. She reports spot was painful and bled on occasion. She applied clobetasol topically x 4-5 days and reports area has since resolved. She continues her oral HRT with Premarin PO and vaginal estrogen locally with Estrace cream.           Cancer History:   Oncology/Hematology History   Vulvar cancer, carcinoma (CMS/HCC)   8/14/2017 Biopsy    History of lichens sclerosus, on clobetasol therapy. Pt presented to Dr. Abigail Fong with c/o painful ulcerative left vulvar lesion. Biopsy revealed invasive SCC, extending into deep margin at least 3.5 mm. Referred to Gyn Oncology     8/30/2017 Surgery    Modified radical vulvectomy, left inguinofemoral sentinel lymph node dissection, right inguinofemoral lymph node dissection, and mapping biopsies of the vulva. Surgery by Dr. Nassar and Dr. Bree Russell.     Pathology revealed SCC, focally keratinizing with 0.3 cm invasion. LVSI negative, lymph nodes negative, mapping biopsies negative. Stage IB grade 2       9/24/2017 - 9/27/2017 Other Event    Inpatient hospital admission for inguinal incision cellulitis, inguinal fluid collection, and SIRS. Treated with IV antibiotics and sent home on oral antibiotics.      10/26/2017 - 10/27/2017 Other Event    2 day inpatient admission for recurrent cellulitis, resolved with antibiotic management         Past Medical History:   Diagnosis Date   • Acute upper respiratory infection     • Body piercing 2020    ears   • Elevated cholesterol    • Epilepsy (CMS/HCC) 2020    Patient reported she was taken off her medication for this apx 10 years ago.  Reported no seizure activity for apx. 8 years ago.     • Fatigue    • H/O exercise stress test 2020    Patient reported done 3-4 years ago and that all was wnl's at that time   • History of bronchitis 2020   • History of foot fracture 2020    Hx left foot - no surgery required   • History of pneumonia 2020   • Hypercholesteremia    • Hypertension    • Influenza    • Lichen sclerosus of female genitalia    • Ovarian follicular cyst    • Rectal bleeding 2020   • Sinusitis    • Sore throat    • UTI (urinary tract infection) 10/2/2017   • Vulva cancer (CMS/HCC)    • Wears contact lenses 2020    Patient advised contacts will need to be removed DOS       Past Surgical History:   Procedure Laterality Date   •  SECTION     • COLONOSCOPY N/A 3/23/2020    Procedure: COLONOSCOPY WITH BIOPSY;  Surgeon: Eleni Russell MD;  [facility];  Service: Gastroenterology;  Laterality: N/A;   • HAND SURGERY Right 2014   • LUMBAR LAMINECTOMY  2011    HEMILAMI RT-C7-T1, RT C7-T1 MEDIAL FACET & CT-T1 DISC (Dr. Cabrera   • ORIF ANKLE FRACTURE Right    • TOTAL ABDOMINAL HYSTERECTOMY WITH SALPINGO OOPHORECTOMY      Dr. Richard Armendariz - [place] , [place]    • VULVECTOMY N/A 2017    Procedure: MODIFIED RADICAL VULVECTOMY, BILATERAL SENTINAL LYMPH NODE DISSECTION, MAPPING BIOPSIES OF VULVA;  Surgeon: Isabela Nassar MD;  Location: [facility] OR;  Service:        MEDICATIONS: The current medication list was reviewed and reconciled.     Allergies:  is allergic to penicillins.    Family History   Problem Relation Age of Onset   • Diabetes Mother    • Congenital heart disease Mother    • Hypertension Mother    • Stroke Mother    • Hyperlipidemia Mother    • Diabetes Father    • Congenital heart disease  "Father    • Hypertension Father    • Hyperlipidemia Father    • Heart attack Other    • Hyperlipidemia Other    • Cancer Neg Hx    • Breast cancer Neg Hx        Last imaging study was CT abdomen pelvis 10/26/2017.     Review of Systems   Constitutional: Negative for appetite change, chills, fatigue, fever and unexpected weight change.   Respiratory: Negative for cough, shortness of breath and wheezing.    Cardiovascular: Negative for chest pain, palpitations and leg swelling.   Gastrointestinal: Negative for abdominal distention, abdominal pain, blood in stool, constipation, diarrhea, nausea and vomiting.   Endocrine: Negative.    Genitourinary: Positive for genital sores (? right vulva). Negative for dyspareunia, dysuria, frequency, hematuria, pelvic pain, urgency, vaginal bleeding, vaginal discharge and vaginal pain.   Musculoskeletal: Negative for arthralgias, gait problem and joint swelling.   Neurological: Negative for dizziness, seizures, syncope, weakness, light-headedness, numbness and headaches.   Hematological: Negative for adenopathy.   Psychiatric/Behavioral: Negative.          Physical Exam  Vital Signs: /70   Pulse 70   Temp 97.7 °F (36.5 °C) (Temporal)   Resp 17   Ht 154.9 cm (60.98\")   Wt 73.5 kg (162 lb)   LMP  (LMP Unknown)   SpO2 93%   BMI 30.63 kg/m²   Vitals:    10/23/20 0846   PainSc: 0-No pain           General Appearance:  alert, cooperative, no apparent distress and appears stated age   Neurologic/Psychiatric: A&O x 3, gait steady, appropriate affect   HEENT:  Normocephalic, without obvious abnormality, mucous membranes moist   Neck: Supple, symmetrical, trachea midline, no adenopathy;  No thyromegaly, masses, or tenderness   Back:   Symmetric, no curvature, ROM normal, no CVA tenderness   Lungs:   Clear to auscultation bilaterally; respirations regular, even, and unlabored bilaterally   Heart:  Regular rate and rhythm, no murmurs appreciated   Breasts:  deferred   Abdomen:   " Soft, non-tender, non-distended and no organomegaly   Lymph nodes: No inguinal adenopathy noted   Extremities: Normal, atraumatic; no clubbing, cyanosis, or edema    Pelvic: External Genitalia  changes consistent with prior excision, diffuse lichen sclerosus changes with labial fusion. No visible lesions to right vulva. no excoriation. Tissues overall stable, but perineum with mild rough patch will need close observation  Vagina  is pink, moist, without lesions.  and small introitus  Vaginal Cuff  Female Vaginal Cuff: smooth, intact and without visible lesions  Uterus  surgically absent  Ovaries  without palpable masses or fullness  Parametria  smooth  Rectovaginal  Female rectovaginal: deferred     ECOG Performance Status: (0) Fully Active - Able to Carry On All Pre-disease Performance Without Restriction    Procedure Note:  No notes on file      Assessment and Plan:  Diagnoses and all orders for this visit:    1. Vulvar cancer, carcinoma (CMS/HCC) (Primary)    2. Lichen sclerosus et atrophicus          There is no evidence of disease upon today's exam. No visible lesion to right vulva, patient reports previous area has resolved per her symptoms as well. We discussed that the perineum will require close observation. I would like to repeat exam in 2-3 months to assess for stability. Continue current topical regimen, be very sparing with clobetasol. She is understanding to call with any changes in pelvic symptoms or general GYN concerns at any time between regularly scheduled visits.       Pain assessment was performed today as a part of patient’s care.  For patients with pain related to surgery, gynecologic malignancy or cancer treatment, the plan is as noted in the assessment/plan.  For patients with pain not related to these issues, they are to seek any further needed care from a more appropriate provider, such as PCP.      Return to clinic in 2-3 months for repeat exam.      Electronically signed by Yamile Belle  ISAIAH Torres on 10/28/20 at 10:17 EDT

## 2020-10-28 NOTE — PROGRESS NOTES
Subjective     Chief Complaint:    Chief Complaint   Patient presents with   • Fatigue   • Irregular Heart Beat     slow       History of Present Illness:   Notes worsening fatigue over the past few weeks.   She checked her fitbit and notes heart rate was 48. She looked at trends and noted that it was in the 50's.. she notes strong family history of heart disease. Will sometimes feel dizzy when she stands up.   Does not exercise.   Sleeps 7-8 hours at night. Wakes up 2-3 times at night to use the bathroom.   Had hysterectomy about 20 years ago.   Does have hx of vulvarian cancer 3 years ago. Had right lymph nodes in her groin removed.   Has HTN. Takes lisinopril. Takes demedex for swelling.   Has been eating and drinking ok.   Denies depression and anxiety. No added stress.     Review of Systems  Gen- No fevers, chills  CV- No chest pain, palpitations  Resp- No cough, dyspnea  GI- No N/V/D, abd pain  Neuro-No dizziness, headaches      I have reviewed and/or updated the patient's past medical, surgical, family, social history and problem list as appropriate.     Medications:    Current Outpatient Medications:   •  estradiol (ESTRACE) 0.1 MG/GM vaginal cream, Insert  into the vagina 2 (Two) Times a Week., Disp: , Rfl:   •  estrogens, conjugated, (PREMARIN) 0.625 MG tablet, Take 1 tablet by mouth Daily., Disp: 30 tablet, Rfl: 0  •  fenofibrate (TRICOR) 54 MG tablet, Take 1 tablet by mouth once daily, Disp: 90 tablet, Rfl: 1  •  hydrocortisone (ANUSOL-HC) 2.5 % rectal cream, Insert  into the rectum 2 (Two) Times a Day., Disp: 28 g, Rfl: 0  •  lisinopril (PRINIVIL,ZESTRIL) 10 MG tablet, Take 1 tablet by mouth Daily., Disp: 90 tablet, Rfl: 3  •  promethazine-dextromethorphan (PROMETHAZINE-DM) 6.25-15 MG/5ML syrup, Take 5 mL by mouth 4 (Four) Times a Day As Needed for Cough., Disp: 118 mL, Rfl: 0  •  rosuvastatin (CRESTOR) 40 MG tablet, TAKE 1 TABLET BY MOUTH AT NIGHT, Disp: 90 tablet, Rfl: 1  •  torsemide (DEMADEX)  "10 MG tablet, Take 1 tablet by mouth once daily, Disp: 30 tablet, Rfl: 1    Allergies:  Allergies   Allergen Reactions   • Penicillins Hives       Objective     Vital Signs:   Vitals:    10/28/20 1501   BP: 108/60   Pulse: 67   SpO2: 98%   Weight: 74.4 kg (164 lb)   Height: 170.2 cm (67\")       Physical Exam:    Physical Exam  Vitals signs and nursing note reviewed.   Constitutional:       Appearance: She is well-developed.   HENT:      Head: Normocephalic and atraumatic.   Eyes:      Pupils: Pupils are equal, round, and reactive to light.   Neck:      Musculoskeletal: Neck supple.   Cardiovascular:      Rate and Rhythm: Normal rate and regular rhythm.      Heart sounds: Normal heart sounds.   Pulmonary:      Effort: Pulmonary effort is normal.      Breath sounds: Normal breath sounds.   Abdominal:      General: Bowel sounds are normal. There is no distension.      Palpations: Abdomen is soft.      Tenderness: There is no abdominal tenderness.   Skin:     General: Skin is warm and dry.   Neurological:      Mental Status: She is alert and oriented to person, place, and time.   Psychiatric:         Behavior: Behavior normal.       ECG 12 Lead    Date/Time: 10/28/2020 6:09 PM  Performed by: Divina Sanz APRN  Authorized by: Divina Sanz APRN   Comparison: compared with previous ECG   Rhythm: sinus bradycardia  Conduction: non-specific intraventricular conduction delay  ST Segments: ST segments normal  T Waves: T waves normal  Other: no other findings    Clinical impression: abnormal EKG              Assessment / Plan     Assessment/Plan:   Problem List Items Addressed This Visit        Other    Fatigue    Relevant Orders    TSH    Comprehensive Metabolic Panel    CBC Auto Differential    Vitamin B12    Vitamin D 25 Hydroxy    Folate    Ambulatory Referral to Cardiology    Holter Monitor - 72 Hour Up To 21 Days      Other Visit Diagnoses     Bradycardia    -  Primary    Relevant Orders    TSH    Ambulatory " Referral to Cardiology    Holter Monitor - 72 Hour Up To 21 Days    Dizziness        Relevant Orders    Ambulatory Referral to Cardiology    Holter Monitor - 72 Hour Up To 21 Days        -- orders as above  -- cut back lisinopril to 5mg and demedex to 5mg.  -- orthostatic BP was normal  -- EKG with bradycardia without heart block but somewhat wide QRS at .12, will refer to cardiology given she is symptomatic with fatigue    Follow up:  As needed    Electronically signed by ISAIAH Thompson   10/28/2020 15:31 EDT      Please note that portions of this note may have been completed with a voice recognition program. Efforts were made to edit the dictations, but occasionally words are mistranscribed.

## 2020-10-29 LAB
25(OH)D3+25(OH)D2 SERPL-MCNC: 26.5 NG/ML (ref 30–100)
ALBUMIN SERPL-MCNC: 4.5 G/DL (ref 3.8–4.8)
ALBUMIN/GLOB SERPL: 1.8 {RATIO} (ref 1.2–2.2)
ALP SERPL-CCNC: 48 IU/L (ref 39–117)
ALT SERPL-CCNC: 26 IU/L (ref 0–32)
AST SERPL-CCNC: 23 IU/L (ref 0–40)
BASOPHILS # BLD AUTO: 0.1 X10E3/UL (ref 0–0.2)
BASOPHILS NFR BLD AUTO: 1 %
BILIRUB SERPL-MCNC: 0.2 MG/DL (ref 0–1.2)
BUN SERPL-MCNC: 15 MG/DL (ref 6–24)
BUN/CREAT SERPL: 16 (ref 9–23)
CALCIUM SERPL-MCNC: 9.4 MG/DL (ref 8.7–10.2)
CHLORIDE SERPL-SCNC: 100 MMOL/L (ref 96–106)
CO2 SERPL-SCNC: 25 MMOL/L (ref 20–29)
CREAT SERPL-MCNC: 0.92 MG/DL (ref 0.57–1)
EOSINOPHIL # BLD AUTO: 0.1 X10E3/UL (ref 0–0.4)
EOSINOPHIL NFR BLD AUTO: 1 %
ERYTHROCYTE [DISTWIDTH] IN BLOOD BY AUTOMATED COUNT: 11.9 % (ref 11.7–15.4)
FOLATE SERPL-MCNC: >20 NG/ML
GLOBULIN SER CALC-MCNC: 2.5 G/DL (ref 1.5–4.5)
GLUCOSE SERPL-MCNC: 80 MG/DL (ref 65–99)
HCT VFR BLD AUTO: 40 % (ref 34–46.6)
HGB BLD-MCNC: 13.7 G/DL (ref 11.1–15.9)
IMM GRANULOCYTES # BLD AUTO: 0 X10E3/UL (ref 0–0.1)
IMM GRANULOCYTES NFR BLD AUTO: 0 %
LYMPHOCYTES # BLD AUTO: 2.2 X10E3/UL (ref 0.7–3.1)
LYMPHOCYTES NFR BLD AUTO: 30 %
MCH RBC QN AUTO: 31.1 PG (ref 26.6–33)
MCHC RBC AUTO-ENTMCNC: 34.3 G/DL (ref 31.5–35.7)
MCV RBC AUTO: 91 FL (ref 79–97)
MONOCYTES # BLD AUTO: 0.4 X10E3/UL (ref 0.1–0.9)
MONOCYTES NFR BLD AUTO: 6 %
NEUTROPHILS # BLD AUTO: 4.5 X10E3/UL (ref 1.4–7)
NEUTROPHILS NFR BLD AUTO: 62 %
PLATELET # BLD AUTO: 256 X10E3/UL (ref 150–450)
POTASSIUM SERPL-SCNC: 4.2 MMOL/L (ref 3.5–5.2)
PROT SERPL-MCNC: 7 G/DL (ref 6–8.5)
RBC # BLD AUTO: 4.41 X10E6/UL (ref 3.77–5.28)
SODIUM SERPL-SCNC: 140 MMOL/L (ref 134–144)
TSH SERPL DL<=0.005 MIU/L-ACNC: 3.19 UIU/ML (ref 0.45–4.5)
VIT B12 SERPL-MCNC: 465 PG/ML (ref 232–1245)
WBC # BLD AUTO: 7.2 X10E3/UL (ref 3.4–10.8)

## 2020-11-17 PROCEDURE — U0004 COV-19 TEST NON-CDC HGH THRU: HCPCS | Performed by: NURSE PRACTITIONER

## 2020-11-23 ENCOUNTER — APPOINTMENT (OUTPATIENT)
Dept: GENERAL RADIOLOGY | Facility: HOSPITAL | Age: 50
End: 2020-11-23

## 2020-11-23 ENCOUNTER — HOSPITAL ENCOUNTER (EMERGENCY)
Facility: HOSPITAL | Age: 50
Discharge: HOME | End: 2020-11-23
Attending: EMERGENCY MEDICINE | Admitting: EMERGENCY MEDICINE

## 2020-11-23 ENCOUNTER — TELEPHONE (OUTPATIENT)
Dept: FAMILY MEDICINE CLINIC | Facility: CLINIC | Age: 50
End: 2020-11-23

## 2020-11-23 VITALS
BODY MASS INDEX: 29.83 KG/M2 | HEIGHT: 61 IN | WEIGHT: 158 LBS | OXYGEN SATURATION: 93 % | DIASTOLIC BLOOD PRESSURE: 94 MMHG | RESPIRATION RATE: 18 BRPM | HEART RATE: 86 BPM | TEMPERATURE: 98.6 F | SYSTOLIC BLOOD PRESSURE: 128 MMHG

## 2020-11-23 DIAGNOSIS — U07.1 COVID-19: Primary | ICD-10-CM

## 2020-11-23 PROCEDURE — 71045 X-RAY EXAM CHEST 1 VIEW: CPT

## 2020-11-23 PROCEDURE — 93005 ELECTROCARDIOGRAM TRACING: CPT | Performed by: PHYSICIAN ASSISTANT

## 2020-11-23 PROCEDURE — 99282 EMERGENCY DEPT VISIT SF MDM: CPT

## 2020-11-23 RX ORDER — CONJUGATED ESTROGENS 0.62 MG/1
TABLET, FILM COATED ORAL
Qty: 90 TABLET | Refills: 0 | Status: SHIPPED | OUTPATIENT
Start: 2020-11-23 | End: 2021-04-27 | Stop reason: SDUPTHER

## 2020-11-23 NOTE — TELEPHONE ENCOUNTER
Pt. Due for annual, she currently has COVID though. Note to pharmacy that pt needs to call office for appointment for additional refills.

## 2020-11-23 NOTE — TELEPHONE ENCOUNTER
PT'S  (SYLVIE) CALLED STATING PT WE NT TO  ON 11/17 WAS DIAGNOSED WITH BRONCHITIS AND POSITIVE FOR COVID, PT HAS BEEN ON MEDS AND 2 INHALERS AND THE PT IS NOT GETTING BETTER,  STATES THE PT IS HAVING TROUBLE BREATHING AND HAVING TIGHTNESS IN HER CHEST, I DID ADVISE THEM TO GO TO THE ER OR CALL 911 IF SHE IS HAVING DIFFICULTY BREATHING AND THE CHEST PAIN.  STATED THEY WILL THINK ABOUT WHAT THEY NEED TO DO.

## 2020-12-17 ENCOUNTER — OFFICE VISIT (OUTPATIENT)
Dept: GYNECOLOGIC ONCOLOGY | Facility: CLINIC | Age: 50
End: 2020-12-17

## 2020-12-17 VITALS
SYSTOLIC BLOOD PRESSURE: 136 MMHG | DIASTOLIC BLOOD PRESSURE: 74 MMHG | BODY MASS INDEX: 31.93 KG/M2 | OXYGEN SATURATION: 98 % | HEART RATE: 90 BPM | TEMPERATURE: 97.1 F | RESPIRATION RATE: 12 BRPM | WEIGHT: 169 LBS

## 2020-12-17 DIAGNOSIS — N90.89 VULVAR LESION: Primary | ICD-10-CM

## 2020-12-17 DIAGNOSIS — C51.9 VULVAR CANCER, CARCINOMA (HCC): ICD-10-CM

## 2020-12-17 PROCEDURE — 99214 OFFICE O/P EST MOD 30 MIN: CPT | Performed by: NURSE PRACTITIONER

## 2020-12-17 PROCEDURE — 88305 TISSUE EXAM BY PATHOLOGIST: CPT | Performed by: NURSE PRACTITIONER

## 2020-12-17 PROCEDURE — 56605 BIOPSY OF VULVA/PERINEUM: CPT | Performed by: NURSE PRACTITIONER

## 2020-12-18 NOTE — PROGRESS NOTES
GYN ONCOLOGY CANCER SURVEILLANCE FOLLOW-UP    Edyta Franco  [id number]  1970    Chief Complaint: Follow-up (new spot left vulva)        History of present illness:  Edyta Franco is a 50 y.o. year old female who is here today for ongoing surveillance of Vulvar Cancer, see Cancer History. She also has a significant history of symptomatic lichen sclerosus. She has undergone numerous topical treatments and physical therapy for severe vaginal symptoms and risks. She is here today for short-term follow-up of rough patch to perineum. Patient feels this may have resolved, but c/o new lesion to left vulva. She first noticed this 1 week ago. Area does not bleed, but feels irritated.         Cancer History:   Oncology/Hematology History   Vulvar cancer, carcinoma (CMS/HCC)   8/14/2017 Biopsy    History of lichens sclerosus, on clobetasol therapy. Pt presented to Dr. Abigail Fong with c/o painful ulcerative left vulvar lesion. Biopsy revealed invasive SCC, extending into deep margin at least 3.5 mm. Referred to Gyn Oncology     8/30/2017 Surgery    Modified radical vulvectomy, left inguinofemoral sentinel lymph node dissection, right inguinofemoral lymph node dissection, and mapping biopsies of the vulva. Surgery by Dr. Nassar and Dr. Bree Russell.     Pathology revealed SCC, focally keratinizing with 0.3 cm invasion. LVSI negative, lymph nodes negative, mapping biopsies negative. Stage IB grade 2       9/24/2017 - 9/27/2017 Other Event    Inpatient hospital admission for inguinal incision cellulitis, inguinal fluid collection, and SIRS. Treated with IV antibiotics and sent home on oral antibiotics.      10/26/2017 - 10/27/2017 Other Event    2 day inpatient admission for recurrent cellulitis, resolved with antibiotic management         Past Medical History:   Diagnosis Date   • Acute upper respiratory infection    • Body piercing 03/20/2020    ears   • Elevated cholesterol    • Epilepsy (CMS/HCC) 03/20/2020     Patient reported she was taken off her medication for this apx 10 years ago.  Reported no seizure activity for apx. 8 years ago.     • Fatigue    • H/O exercise stress test 2020    Patient reported done 3-4 years ago and that all was wnl's at that time   • History of bronchitis 2020   • History of foot fracture 2020    Hx left foot - no surgery required   • History of pneumonia 2020   • Hypercholesteremia    • Hypertension    • Influenza    • Lichen sclerosus of female genitalia    • Ovarian follicular cyst    • Rectal bleeding 2020   • Sinusitis    • Sore throat    • UTI (urinary tract infection) 10/2/2017   • Vulva cancer (CMS/HCC)    • Wears contact lenses 2020    Patient advised contacts will need to be removed DOS       Past Surgical History:   Procedure Laterality Date   •  SECTION     • COLONOSCOPY N/A 3/23/2020    Procedure: COLONOSCOPY WITH BIOPSY;  Surgeon: Eleni Russell MD;  Location: [facility];  Service: Gastroenterology;  Laterality: N/A;   • HAND SURGERY Right 2014   • LUMBAR LAMINECTOMY  2011    HEMILAMI RT-C7-T1, RT C7-T1 MEDIAL FACET & CT-T1 DISC (Dr. Cabrera   • ORIF ANKLE FRACTURE Right    • TOTAL ABDOMINAL HYSTERECTOMY WITH SALPINGO OOPHORECTOMY      Dr. Richard Armendariz - [place]    • VULVECTOMY N/A 2017    Procedure: MODIFIED RADICAL VULVECTOMY, BILATERAL SENTINAL LYMPH NODE DISSECTION, MAPPING BIOPSIES OF VULVA;  Surgeon: Isabela Nassar MD;  Location: [facility] OR;  Service:        MEDICATIONS: The current medication list was reviewed and reconciled.     Allergies:  is allergic to penicillins.    Family History   Problem Relation Age of Onset   • Diabetes Mother    • Congenital heart disease Mother    • Hypertension Mother    • Stroke Mother    • Hyperlipidemia Mother    • Diabetes Father    • Congenital heart disease Father    • Hypertension Father    • Hyperlipidemia Father    • Heart attack Other    •  Hyperlipidemia Other    • Cancer Neg Hx    • Breast cancer Neg Hx        Last imaging study was CT abdomen pelvis 10/26/2017.     Review of Systems   Constitutional: Negative for appetite change, chills, fatigue, fever and unexpected weight change.   Respiratory: Negative for cough, shortness of breath and wheezing.    Cardiovascular: Negative for chest pain, palpitations and leg swelling.   Gastrointestinal: Negative for abdominal distention, abdominal pain, blood in stool, constipation, diarrhea, nausea and vomiting.   Endocrine: Negative.    Genitourinary: Positive for genital sores (new lesion to left vulva). Negative for dyspareunia, dysuria, frequency, hematuria, pelvic pain, urgency, vaginal bleeding, vaginal discharge and vaginal pain.   Musculoskeletal: Negative for arthralgias, gait problem and joint swelling.   Neurological: Negative for dizziness, seizures, syncope, weakness, light-headedness, numbness and headaches.   Hematological: Negative for adenopathy.   Psychiatric/Behavioral: Negative.          Physical Exam  Vital Signs: /74   Pulse 90   Temp 97.1 °F (36.2 °C) (Temporal)   Resp 12   Wt 76.7 kg (169 lb)   LMP  (LMP Unknown)   SpO2 98%   BMI 31.93 kg/m²   Vitals:    12/17/20 1433   PainSc: 0-No pain           General Appearance:  alert, cooperative, no apparent distress and appears stated age   Neurologic/Psychiatric: A&O x 3, gait steady, appropriate affect   HEENT:  Normocephalic, without obvious abnormality, mucous membranes moist   Lungs:   Clear to auscultation bilaterally; respirations regular, even, and unlabored bilaterally   Heart:  Regular rate and rhythm, no murmurs appreciated   Breasts:  deferred   Abdomen:   Soft, non-tender, non-distended and no organomegaly   Lymph nodes: No inguinal adenopathy noted   Extremities: Normal, atraumatic; no clubbing, cyanosis, or edema    Pelvic: External Genitalia   changes consistent with prior excision, diffuse lichen sclerosus changes  with labial fusion. No visible lesions to right vulva. no excoriation. Previous rough patch to perineum resolved. New 1 cm lesion noted to left vulva, white in appearance with irregular borders, rough and slightly raised.   Vagina  is pink, moist, without lesions.  and small introitus  Vaginal Cuff  Female Vaginal Cuff: smooth, intact and without visible lesions  Uterus  surgically absent  Ovaries  without palpable masses or fullness  Parametria  smooth  Rectovaginal  Female rectovaginal: deferred     ECOG Performance Status: (0) Fully Active - Able to Carry On All Pre-disease Performance Without Restriction    Procedure Note:  After discussion of procedure and obtaining consent a vulvar punch biopsy was performed. Lesion located upon middle left vulva, approx 1 cm in size. Site cleaned with betadine in the usual fashion. 3 mL lidocaine without epinephrine injected beneath lesion for local anesthesia. Punch biopsy performed and tissue sample cut away from underlying tissue. Specimen placed into appropriate container and labeled at bedside. Bleeding was Minimal. Silver nitrate used to achieve hemostasis. Patient tolerated the procedure well.         Assessment and Plan:  Diagnoses and all orders for this visit:    1. Vulvar lesion (Primary)  -     Tissue Pathology Exam    2. Vulvar cancer, carcinoma (CMS/HCC)          Previous irregularity at perineum resolved. However, new lesion at left vulva suspicious and biopsied. Procedure tolerated well. Patient will be notified of results upon their return. No other masses or suspicious lesions apparent. Follow-up to be determined upon return of biopsy results.     Pain assessment was performed today as a part of patient’s care.  For patients with pain related to surgery, gynecologic malignancy or cancer treatment, the plan is as noted in the assessment/plan.  For patients with pain not related to these issues, they are to seek any further needed care from a more appropriate  provider, such as PCP.      Return to clinic to be determined upon return of biopsy result.       Electronically signed by ISAIAH Feliciano on 12/18/20 at 15:22 EST

## 2020-12-21 LAB
CYTO UR: NORMAL
LAB AP CASE REPORT: NORMAL
LAB AP CLINICAL INFORMATION: NORMAL
PATH REPORT.FINAL DX SPEC: NORMAL
PATH REPORT.GROSS SPEC: NORMAL

## 2020-12-23 ENCOUNTER — TELEPHONE (OUTPATIENT)
Dept: GYNECOLOGIC ONCOLOGY | Facility: CLINIC | Age: 50
End: 2020-12-23

## 2020-12-23 NOTE — TELEPHONE ENCOUNTER
PT: SELF    PT CALLING TO SEE IF HER TEST RESULTS OF HER BIOPSY ON Thursday.  PLEASE ADVISE    PT #: [phone]

## 2020-12-23 NOTE — TELEPHONE ENCOUNTER
I called patient and let her know results.     Lichen sclerosus.    No cancer or pre cancer.     I will let Yamile know that I have told her results and see what we need to do for follow up.   She v/u.

## 2020-12-29 NOTE — TELEPHONE ENCOUNTER
I called patient.    She did not answer.     I left a message that the bx was still consistent as previously discussed. Yamile wants her to follow up in 6-9 months. I have made the appointment and told she can view that on mychart. If she needs to change it she can call and do so.

## 2021-01-14 DIAGNOSIS — I10 HYPERTENSION, ESSENTIAL, BENIGN: ICD-10-CM

## 2021-01-14 DIAGNOSIS — I87.2 VENOUS INSUFFICIENCY OF BOTH LOWER EXTREMITIES: ICD-10-CM

## 2021-01-14 RX ORDER — TORSEMIDE 10 MG/1
TABLET ORAL
Qty: 30 TABLET | Refills: 0 | Status: SHIPPED | OUTPATIENT
Start: 2021-01-14 | End: 2021-03-08

## 2021-02-22 RX ORDER — ESTRADIOL 0.1 MG/G
CREAM VAGINAL
Qty: 43 G | Refills: 0 | Status: SHIPPED | OUTPATIENT
Start: 2021-02-22 | End: 2021-05-25 | Stop reason: SDUPTHER

## 2021-03-03 DIAGNOSIS — I10 HYPERTENSION, ESSENTIAL, BENIGN: ICD-10-CM

## 2021-03-03 RX ORDER — LISINOPRIL 10 MG/1
TABLET ORAL
Qty: 90 TABLET | Refills: 3 | Status: SHIPPED | OUTPATIENT
Start: 2021-03-03 | End: 2022-03-07

## 2021-03-05 DIAGNOSIS — I87.2 VENOUS INSUFFICIENCY OF BOTH LOWER EXTREMITIES: ICD-10-CM

## 2021-03-05 DIAGNOSIS — I10 HYPERTENSION, ESSENTIAL, BENIGN: ICD-10-CM

## 2021-03-08 RX ORDER — TORSEMIDE 10 MG/1
TABLET ORAL
Qty: 30 TABLET | Refills: 2 | Status: SHIPPED | OUTPATIENT
Start: 2021-03-08 | End: 2021-07-15

## 2021-04-14 RX ORDER — CONJUGATED ESTROGENS 0.62 MG/1
TABLET, FILM COATED ORAL
Qty: 90 TABLET | Refills: 0 | OUTPATIENT
Start: 2021-04-14

## 2021-04-27 ENCOUNTER — TELEPHONE (OUTPATIENT)
Dept: OBSTETRICS AND GYNECOLOGY | Facility: CLINIC | Age: 51
End: 2021-04-27

## 2021-04-27 NOTE — TELEPHONE ENCOUNTER
Pt called and stated that her pharmacy sent in a refill request from [facility] but it was declined. I explained to her that it may be because she has not had an annual in over a year. I went ahead and scheduled her for annual on May 25, with Dr. Fong. She want a refill on her premarin and was hoping she can get it before her annual.

## 2021-05-04 ENCOUNTER — TELEPHONE (OUTPATIENT)
Dept: OBSTETRICS AND GYNECOLOGY | Facility: CLINIC | Age: 51
End: 2021-05-04

## 2021-05-06 NOTE — TELEPHONE ENCOUNTER
Pt lvm and state she got a script on her premarin and her insurance won't cover it. She was told by her pharmacy that we should be able to take care of it. She said to reach out to her and especially if there Is anything she needs to do

## 2021-05-07 RX ORDER — ESTRADIOL 0.5 MG/1
0.5 TABLET ORAL DAILY
Qty: 30 TABLET | Refills: 11 | Status: SHIPPED | OUTPATIENT
Start: 2021-05-07 | End: 2022-05-18

## 2021-05-07 NOTE — TELEPHONE ENCOUNTER
PA was denied, insurance requiring her to try estradiol tablets first. She has never been on these before. She is ok with trying these. Will ok with KS and send in. Instructed pt if she does not do well with them to call and we can retry the PA. She VU and agreed.

## 2021-05-25 ENCOUNTER — OFFICE VISIT (OUTPATIENT)
Dept: OBSTETRICS AND GYNECOLOGY | Facility: CLINIC | Age: 51
End: 2021-05-25

## 2021-05-25 VITALS — HEIGHT: 61 IN | WEIGHT: 170 LBS | BODY MASS INDEX: 32.1 KG/M2

## 2021-05-25 DIAGNOSIS — Z01.419 WOMEN'S ANNUAL ROUTINE GYNECOLOGICAL EXAMINATION: Primary | ICD-10-CM

## 2021-05-25 DIAGNOSIS — C51.9 VULVAR CANCER, CARCINOMA (HCC): ICD-10-CM

## 2021-05-25 PROCEDURE — 99396 PREV VISIT EST AGE 40-64: CPT | Performed by: OBSTETRICS & GYNECOLOGY

## 2021-05-25 RX ORDER — ESTRADIOL 0.1 MG/G
CREAM VAGINAL
Qty: 43 G | Refills: 5 | Status: SHIPPED | OUTPATIENT
Start: 2021-05-25 | End: 2022-01-10 | Stop reason: SDUPTHER

## 2021-05-25 NOTE — PROGRESS NOTES
GYN Annual Exam     CC - Here for annual exam.     Subjective   HPI  Edyta Franco is a 50 y.o. female, , who presents for annual well woman exam.  She is postmenopausal. Her last LMP was No LMP recorded (lmp unknown). Patient has had a hysterectomy..    Patient reports problems with: hot flashes.  Partner Status: Marital Status: .  New Partners since last visit: no Desires STD Screening: no      S/p radical vulvectomy 2017 for vulvar ca by alex.  She had a biopsy in dec in [facility] for a lesion that was benign.   She reports she has a new spot today that is bothering her some. She is also followed by Gabriela balderrama at [facility] for her lichen. She is using the premarin cream and clobetesol to manage at this time. Dr balderrama had offered other options, but she had declined so far.    Last mammogram: 2019  Last Completed Mammogram          Ordered - MAMMOGRAM (Every 2 Years) Ordered on 2019  Mammo Screening Digital Tomosynthesis Bilateral With CAD              Last colonoscopy:  unsure of the month per pt normal results   Last Completed Colonoscopy     This patient has no relevant Health Maintenance data.        Last DEXA: never per pt    Last Pap : 2017 normal   Last Completed Pap Smear          Ordered - PAP SMEAR (Every 3 Years) Ordered on 2021    11/15/2019  SCANNED - PAP SMEAR    2017  Done              History of abnormal Pap smear: no    Additional OB/GYN History   Current contraception: contraceptive methods: hysterectomy   Desires to: do not start contraception  Family history of uterine, colon, breast, or ovarian cancer: yes - mother ovarian cancer   Performs monthly Self-Breast Exam: no  Parental Hip Fracture: no  Exercises Regularly: no  Feelings of Anxiety or Depression: no    Tobacco Usage?: No   OB History        2    Para   2    Term   2            AB        Living   2       SAB        TAB        Ectopic        Molar      "   Multiple        Live Births                    Health Maintenance   Topic Date Due   • COVID-19 Vaccine (1) Never done   • HEPATITIS C SCREENING  Never done   • LIPID PANEL  02/19/2019   • ZOSTER VACCINE (1 of 2) Never done   • ANNUAL PHYSICAL  02/26/2021   • INFLUENZA VACCINE  08/01/2021   • MAMMOGRAM  12/13/2021   • Annual Gynecologic Pelvic and Breast Exam  05/26/2022   • PAP SMEAR  11/15/2022   • TDAP/TD VACCINES (2 - Td or Tdap) 03/01/2030   • COLORECTAL CANCER SCREENING  03/23/2030   • Pneumococcal Vaccine 0-64  Aged Out       The additional following portions of the patient's history were reviewed and updated as appropriate: current medications, past family history, past medical history, past social history, past surgical history and problem list.    Review of Systems   Constitutional: Negative.    HENT: Negative.    Eyes: Negative.    Respiratory: Negative.    Cardiovascular: Negative.    Gastrointestinal: Negative.    Endocrine: Negative.    Genitourinary: Negative.    Musculoskeletal: Negative.    Skin: Negative.    Allergic/Immunologic: Negative.    Neurological: Negative.    Hematological: Negative.    Psychiatric/Behavioral: Negative.        I have reviewed and agree with the HPI, ROS, and historical information as entered above. Abigail Fong MD    Objective   Ht 154.9 cm (61\")   Wt 77.1 kg (170 lb)   LMP  (LMP Unknown)   Breastfeeding No   BMI 32.12 kg/m²     Physical Exam  Vitals and nursing note reviewed. Exam conducted with a chaperone present.   Constitutional:       Appearance: Normal appearance. She is well-developed.   HENT:      Head: Normocephalic and atraumatic.   Eyes:      Pupils: Pupils are equal, round, and reactive to light.   Pulmonary:      Effort: Pulmonary effort is normal.   Chest:      Breasts:         Right: No mass, nipple discharge or skin change.         Left: No mass, nipple discharge or skin change.   Abdominal:      General: There is no distension.      Palpations: " Abdomen is soft.      Tenderness: There is no abdominal tenderness. There is no guarding.   Genitourinary:     General: Normal vulva.      Exam position: Lithotomy position.      Vagina: Normal. No vaginal discharge, bleeding or lesions.      Adnexa:         Right: No tenderness.          Left: No tenderness.        Rectum: Normal.   Musculoskeletal:         General: Normal range of motion.      Cervical back: Normal range of motion and neck supple.      Right lower leg: No edema.      Left lower leg: No edema.   Skin:     General: Skin is warm and dry.   Neurological:      Mental Status: She is alert and oriented to person, place, and time.   Psychiatric:         Behavior: Behavior normal.     lichen sclerosis changes. On her right inner labia, there is an area that is more excoriated and red, and this is the spot where she is more tender.       Assessment/Plan     Encounter Diagnoses   Name Primary?   • Women's annual routine gynecological examination Yes   • Vulvar cancer, carcinoma (CMS/[facility])        Recommended use of Vitamin D replacement and getting adequate calcium in her diet. (1500mg)  Reviewed monthly self breast exams.  Instructed to call with lumps, pain, or breast discharge.    Continue yearly mammography  Reviewed HPV guidelines.  Reviewed exercise as a preventative health measures.   Reviewed risks/benefits of ERT including the lack of evidence estrogen alone increases the risk of breast cancer or stroke and decreases risk of hip fracture, colon cancer and all cause mortality.  However there is concern slight increase risk of pulmonary embolism.   Patient strongly desires to stay on or start HRT.  She understands she will use the lowest dose that adequately controls her symptoms.     She is going to try and increase the estrogen cream in the next couple weeks on this tender area, but she already has a sched FU with dr johnson in the next few weeks. She will reeval that spot. Otherwise her lichen  changes have been stable.     Abigail Fong MD   05/25/2021

## 2021-06-01 DIAGNOSIS — C51.9 VULVAR CANCER, CARCINOMA (HCC): ICD-10-CM

## 2021-06-01 DIAGNOSIS — Z01.419 WOMEN'S ANNUAL ROUTINE GYNECOLOGICAL EXAMINATION: ICD-10-CM

## 2021-06-10 ENCOUNTER — TELEPHONE (OUTPATIENT)
Dept: GYNECOLOGIC ONCOLOGY | Facility: CLINIC | Age: 51
End: 2021-06-10

## 2021-06-10 NOTE — TELEPHONE ENCOUNTER
Caller: Edyta Franco    Relationship to patient: Self    Best call back number: [phone]    Chief complaint: PT CALLED TO RESCHEDULE APPT FOR 6/30/21 AS SHE WILL BE ON VACATION.    IN ADDITION, SHE RECENTLY SAW DR. DUNN WHO FOUND A NEW LESION AND HAS GIVEN HER A TOPICAL MEDICATION TO USE UNTIL SHE CAN BE SEEN.  PATIENT WOULD LIKE TO GET IN AS SOON AS POSSIBLE DUE TO THIS NEW LESION.      Type of visit: FU    Requested date: ANY DAY EXCEPT 6/15/21     If rescheduling, when is the original appointment: 6/30/21    Additional notes: PLEASE CONTACT PATIENT TO ADVISE. PATIENT IS AVAILABLE AT NUMBER LISTED.

## 2021-06-15 NOTE — PROGRESS NOTES
GYN ONCOLOGY CANCER SURVEILLANCE FOLLOW-UP    Edyta Franco  [id number]  1970    Chief Complaint: Follow-up (6 month fu, lichen schlerosus)        History of present illness:  Edyta Franco is a 50 y.o. year old female who is here today for ongoing surveillance of Vulvar Cancer, see Cancer History.   She also has a significant history of symptomatic lichen sclerosus.  Patient notes lesion of right vulva.  She saw Dr. Fong and was started on estradiol topical 3-1/2 weeks ago.  She thinks there has been some minimal improvement.  She also uses clobetasol for her lichen sclerosus on a pretty regular basis.  She reports that it is a painful lesion and has been present for few months.  She has no other complaints today.      She had a Covid infection November 2020 and has not undergone vaccination.     Cancer History:   Oncology/Hematology History   Vulvar cancer, carcinoma (CMS/HCC)   8/14/2017 Biopsy    History of lichens sclerosus, on clobetasol therapy. Pt presented to Dr. Abigail Fong with c/o painful ulcerative left vulvar lesion. Biopsy revealed invasive SCC, extending into deep margin at least 3.5 mm. Referred to Gyn Oncology     8/30/2017 Surgery    Modified radical vulvectomy, left inguinofemoral sentinel lymph node dissection, right inguinofemoral lymph node dissection, and mapping biopsies of the vulva. Surgery by Dr. Nassar and Dr. Bree Russell.     Pathology revealed SCC, focally keratinizing with 0.3 cm invasion. LVSI negative, lymph nodes negative, mapping biopsies negative. Stage IB grade 2       9/24/2017 - 9/27/2017 Other Event    Inpatient hospital admission for inguinal incision cellulitis, inguinal fluid collection, and SIRS. Treated with IV antibiotics and sent home on oral antibiotics.      10/26/2017 - 10/27/2017 Other Event    2 day inpatient admission for recurrent cellulitis, resolved with antibiotic management         Past Medical History:   Diagnosis Date   • Acute upper  respiratory infection    • Body piercing 2020    ears   • Elevated cholesterol    • Epilepsy (CMS/HCC) 2020    Patient reported she was taken off her medication for this apx 10 years ago.  Reported no seizure activity for apx. 8 years ago.     • Fatigue    • H/O exercise stress test 2020    Patient reported done 3-4 years ago and that all was wnl's at that time   • History of bronchitis 2020   • History of foot fracture 2020    Hx left foot - no surgery required   • History of pneumonia 2020   • Hypercholesteremia    • Hypertension    • Influenza    • Lichen sclerosus of female genitalia    • Ovarian follicular cyst    • Rectal bleeding 2020   • Sinusitis    • Sore throat    • UTI (urinary tract infection) 10/2/2017   • Vulva cancer (CMS/HCC)    • Wears contact lenses 2020    Patient advised contacts will need to be removed DOS       Past Surgical History:   Procedure Laterality Date   •  SECTION     • COLONOSCOPY N/A 3/23/2020    Procedure: COLONOSCOPY WITH BIOPSY;  Surgeon: Eleni Russell MD;  Location: [facility] ENDOSCOPY;  Service: Gastroenterology;  Laterality: N/A;   • HAND SURGERY Right 2014   • LUMBAR LAMINECTOMY  2011    HEMILAMI RT-C7-T1, RT C7-T1 MEDIAL FACET & CT-T1 DISC (Dr. Cabrera   • ORIF ANKLE FRACTURE Right    • TOTAL ABDOMINAL HYSTERECTOMY WITH SALPINGO OOPHORECTOMY      Dr. Richard Armendariz - [place] , [place]    • VULVECTOMY N/A 2017    Procedure: MODIFIED RADICAL VULVECTOMY, BILATERAL SENTINAL LYMPH NODE DISSECTION, MAPPING BIOPSIES OF VULVA;  Surgeon: Isabela Nassar MD;  Location: [facility] OR;  Service:        MEDICATIONS: The current medication list was reviewed and reconciled.     Allergies:  is allergic to penicillins.    Family History   Problem Relation Age of Onset   • Diabetes Mother    • Congenital heart disease Mother    • Hypertension Mother    • Stroke Mother    • Hyperlipidemia Mother    • Diabetes Father    •  "Congenital heart disease Father    • Hypertension Father    • Hyperlipidemia Father    • Heart attack Other    • Hyperlipidemia Other    • Cancer Neg Hx    • Breast cancer Neg Hx        Last imaging study was CT abdomen pelvis 10/26/2017.     Review of Systems   Constitutional: Negative for appetite change, chills, fatigue, fever and unexpected weight change.   Respiratory: Negative for cough, shortness of breath and wheezing.    Cardiovascular: Negative for chest pain, palpitations and leg swelling.   Gastrointestinal: Negative for abdominal distention, abdominal pain, blood in stool, constipation, diarrhea, nausea and vomiting.   Endocrine: Negative.    Genitourinary: Positive for genital sores (new lesion to left vulva). Negative for dyspareunia, dysuria, frequency, hematuria, pelvic pain, urgency, vaginal bleeding, vaginal discharge and vaginal pain.   Musculoskeletal: Negative for arthralgias, gait problem and joint swelling.   Neurological: Negative for dizziness, seizures, syncope, weakness, light-headedness, numbness and headaches.   Hematological: Negative for adenopathy.   Psychiatric/Behavioral: Negative.          Physical Exam  Vital Signs: /90   Pulse 80   Resp 18   Ht 154.9 cm (60.98\")   Wt 77.2 kg (170 lb 4.8 oz)   LMP  (LMP Unknown)   SpO2 98%   BMI 32.19 kg/m²   Vitals:    06/16/21 1453   PainSc:   7           General Appearance:  alert, cooperative, no apparent distress and appears stated age   Neurologic/Psychiatric: A&O x 3, gait steady, appropriate affect   HEENT:  Normocephalic, without obvious abnormality, mucous membranes moist   Lungs:    Deferred   Heart:   Deferred   Breasts:  deferred   Abdomen:   Soft, non-tender, non-distended and no organomegaly   Lymph nodes: No inguinal adenopathy noted   Extremities: Normal, atraumatic; no clubbing, cyanosis, or edema    Pelvic: External Genitalia   Show changes consistent with lichen sclerosis that is diffuse.  This involves the " clitoral middleton, medial labium majora, and labia minora.  There is agglutination of the labia minora.  Area of prior biopsy at the left anterior introitus/labia minora was noted.  Surrounding area of somewhat thickened white epithelium consistent with lichen sclerosis versus [place].  This area was not biopsied due to prior biopsy showing lichen sclerosis only.  On the right, there is a 1 cm lesion with erosion of the epithelium.  This is not a girma ulcer.  This was tender on palpation with Q-tip.  There are no other significant findings.  Prior scar was noted.     ECOG Performance Status: (0) Fully Active - Able to Carry On All Pre-disease Performance Without Restriction    Procedure Note:  None        Assessment and Plan:  Diagnoses and all orders for this visit:    1. Vulvar cancer, carcinoma (CMS/HCC) (Primary)    2. Lichen sclerosus et atrophicus    3. Vulvar lesion          History of multiple biopsy showing lichen sclerosis.  Now with new lesion and 3.5 weeks of estrogen.  I recommended continuation of the estrogen as I think this short timeframe is too early to see her response.  I recommended follow-up in 3 months.  However, patient was encouraged to call if she has persistent symptoms that do not improve.  She understands she may need a biopsy in the future.  She was also advised to call if the lesion changed and became any bigger.  She verbalized understanding.      Pain assessment was performed today as a part of patient’s care.  For patients with pain related to surgery, gynecologic malignancy or cancer treatment, the plan is as noted in the assessment/plan.  For patients with pain not related to these issues, they are to seek any further needed care from a more appropriate provider, such as PCP.      RTC: 3 months, sooner if symptoms worsen or any other concerns.      Electronically signed by Isabela Nassar MD on 06/16/21 at 15:36 EDT

## 2021-06-16 ENCOUNTER — OFFICE VISIT (OUTPATIENT)
Dept: GYNECOLOGIC ONCOLOGY | Facility: CLINIC | Age: 51
End: 2021-06-16

## 2021-06-16 VITALS
HEIGHT: 61 IN | WEIGHT: 170.3 LBS | RESPIRATION RATE: 18 BRPM | SYSTOLIC BLOOD PRESSURE: 140 MMHG | BODY MASS INDEX: 32.15 KG/M2 | OXYGEN SATURATION: 98 % | HEART RATE: 80 BPM | DIASTOLIC BLOOD PRESSURE: 90 MMHG

## 2021-06-16 DIAGNOSIS — C51.9 VULVAR CANCER, CARCINOMA (HCC): Primary | ICD-10-CM

## 2021-06-16 DIAGNOSIS — N90.89 VULVAR LESION: ICD-10-CM

## 2021-06-16 DIAGNOSIS — L90.0 LICHEN SCLEROSUS ET ATROPHICUS: ICD-10-CM

## 2021-06-16 PROCEDURE — 99213 OFFICE O/P EST LOW 20 MIN: CPT | Performed by: OBSTETRICS & GYNECOLOGY

## 2021-07-14 DIAGNOSIS — I87.2 VENOUS INSUFFICIENCY OF BOTH LOWER EXTREMITIES: ICD-10-CM

## 2021-07-14 DIAGNOSIS — E78.2 MIXED DYSLIPIDEMIA: ICD-10-CM

## 2021-07-14 DIAGNOSIS — I10 HYPERTENSION, ESSENTIAL, BENIGN: ICD-10-CM

## 2021-07-15 RX ORDER — FENOFIBRATE 54 MG/1
TABLET ORAL
Qty: 90 TABLET | Refills: 2 | Status: SHIPPED | OUTPATIENT
Start: 2021-07-15 | End: 2022-07-18

## 2021-07-15 RX ORDER — TORSEMIDE 10 MG/1
TABLET ORAL
Qty: 90 TABLET | Refills: 2 | Status: SHIPPED | OUTPATIENT
Start: 2021-07-15 | End: 2022-08-08

## 2021-07-22 ENCOUNTER — OFFICE VISIT (OUTPATIENT)
Dept: CARDIOLOGY | Facility: CLINIC | Age: 51
End: 2021-07-22

## 2021-07-22 VITALS
BODY MASS INDEX: 32.98 KG/M2 | DIASTOLIC BLOOD PRESSURE: 74 MMHG | OXYGEN SATURATION: 96 % | HEIGHT: 60 IN | WEIGHT: 168 LBS | SYSTOLIC BLOOD PRESSURE: 104 MMHG | HEART RATE: 89 BPM

## 2021-07-22 DIAGNOSIS — E78.1 HIGH BLOOD TRIGLYCERIDES: ICD-10-CM

## 2021-07-22 DIAGNOSIS — R00.2 PALPITATIONS: Primary | ICD-10-CM

## 2021-07-22 DIAGNOSIS — E78.5 HYPERLIPIDEMIA, UNSPECIFIED HYPERLIPIDEMIA TYPE: ICD-10-CM

## 2021-07-22 PROCEDURE — 93000 ELECTROCARDIOGRAM COMPLETE: CPT | Performed by: INTERNAL MEDICINE

## 2021-07-22 PROCEDURE — 99244 OFF/OP CNSLTJ NEW/EST MOD 40: CPT | Performed by: INTERNAL MEDICINE

## 2021-07-22 NOTE — PROGRESS NOTES
"     [facility] Cardiology OP Consult Note    Edyta Franco  [id number]  2021    Referred By: Divina Sanz APRN    Chief Complaint: Palpitations    History of Present Illness:   Mrs. Edyta Franco is a 51 y.o. female who presents to the Cardiology Clinic for evaluation of palpitations.  The patient has a past medical history significant for hypertension, hyperlipidemia, venous insufficiency with bilateral lower extremity swelling, and obesity.  She has a past cardiac history significant for palpitations.  She has previously undergone multiple outpatient cardiac monitors, with the last being in  with no significant arrhythmias or ectopy at that time.  She presents the cardiology clinic today for reevaluation of palpitations.  The patient reports her last episode of palpitations was approximately 2 weeks ago.  At that time, she reports a brief \"fluttering\" sensation which lasted 1 to 2 seconds before resolving spontaneously.  She denies any sustained episodes.  No associated dizziness, lightheadedness, or presyncopal symptoms.  No history of syncope.  At this point time, her symptoms do not significantly inhibit her daily activities.  No other specific complaints.    Past Cardiac Testin. Last Coronary Angio: None  2. Prior Stress Testing: None  3. Last Echo: Exercise stress echocardiogram    1.  No evidence of inducible ischemia  4. Prior Holter Monitor: 7-day Holter 2020   1.  The predominant rhythm is sinus rhythm with an average heart rate 66 bpm.   2.  Normal atrioventricular conduction.   3.  No sustained supraventricular or ventricular arrhythmias.   4.  No ectopy.   5.  No symptom diary reported.    Review of Systems:   Review of Systems   Constitutional: Negative for activity change, appetite change, chills, diaphoresis, fatigue, fever, unexpected weight gain and unexpected weight loss.   Eyes: Negative for blurred vision and double vision.   Respiratory: " Negative for cough, chest tightness, shortness of breath and wheezing.    Cardiovascular: Positive for palpitations. Negative for chest pain and leg swelling.   Gastrointestinal: Negative for abdominal pain, anal bleeding, blood in stool and GERD.   Endocrine: Negative for cold intolerance and heat intolerance.   Genitourinary: Negative for hematuria.   Neurological: Negative for dizziness, syncope, weakness and light-headedness.   Hematological: Does not bruise/bleed easily.   Psychiatric/Behavioral: Negative for depressed mood and stress. The patient is not nervous/anxious.        Past Medical History:   Past Medical History:   Diagnosis Date   • Acute upper respiratory infection    • Body piercing 2020    ears   • Elevated cholesterol    • Epilepsy (CMS/HCC) 2020    Patient reported she was taken off her medication for this apx 10 years ago.  Reported no seizure activity for apx. 8 years ago.     • Fatigue    • H/O exercise stress test 2020    Patient reported done 3-4 years ago and that all was wnl's at that time   • History of bronchitis 2020   • History of foot fracture 2020    Hx left foot - no surgery required   • History of pneumonia 2020   • Hypercholesteremia    • Hypertension    • Influenza    • Lichen sclerosus of female genitalia    • Ovarian follicular cyst    • Rectal bleeding 2020   • Sinusitis    • Sore throat    • UTI (urinary tract infection) 10/2/2017   • Vulva cancer (CMS/[facility])    • Wears contact lenses 2020    Patient advised contacts will need to be removed DOS       Past Surgical History:   Past Surgical History:   Procedure Laterality Date   •  SECTION     • COLONOSCOPY N/A 3/23/2020    Procedure: COLONOSCOPY WITH BIOPSY;  Surgeon: Eleni Russell MD;  Location: [facility] ENDOSCOPY;  Service: Gastroenterology;  Laterality: N/A;   • HAND SURGERY Right    • LUMBAR LAMINECTOMY  2011    HEMILAMI RT-C7-T1, RT C7-T1 MEDIAL FACET &  CT-T1 DISC (Dr. Cabrera   • ORIF ANKLE FRACTURE Right    • TOTAL ABDOMINAL HYSTERECTOMY WITH SALPINGO OOPHORECTOMY  2001    Dr. Richard Armendariz - [place] , [place]    • VULVECTOMY N/A 8/30/2017    Procedure: MODIFIED RADICAL VULVECTOMY, BILATERAL SENTINAL LYMPH NODE DISSECTION, MAPPING BIOPSIES OF VULVA;  Surgeon: Isabela Nassar MD;  Location: [facility];  Service:        Family History:   Family History   Problem Relation Age of Onset   • Diabetes Mother    • Congenital heart disease Mother    • Hypertension Mother    • Stroke Mother    • Hyperlipidemia Mother    • Diabetes Father    • Congenital heart disease Father    • Hypertension Father    • Hyperlipidemia Father    • Heart attack Other    • Hyperlipidemia Other    • Cancer Neg Hx    • Breast cancer Neg Hx        Social History:   Social History     Socioeconomic History   • Marital status:      Spouse name: Not on file   • Number of children: 2   • Years of education: Not on file   • Highest education level: Not on file   Tobacco Use   • Smoking status: Never Smoker   • Smokeless tobacco: Never Used   Substance and Sexual Activity   • Alcohol use: Yes     Comment: Reported rare use, no history of abuse   • Drug use: No   • Sexual activity: Yes     Partners: Male       Medications:     Current Outpatient Medications:   •  estradiol (ESTRACE) 0.1 MG/GM vaginal cream, Place small amount cream on vulvar lesion 2-3 times per week, Disp: 43 g, Rfl: 5  •  estradiol (ESTRACE) 0.5 MG tablet, Take 1 tablet by mouth Daily., Disp: 30 tablet, Rfl: 11  •  fenofibrate (TRICOR) 54 MG tablet, Take 1 tablet by mouth once daily, Disp: 90 tablet, Rfl: 2  •  lisinopril (PRINIVIL,ZESTRIL) 10 MG tablet, Take 1 tablet by mouth once daily, Disp: 90 tablet, Rfl: 3  •  rosuvastatin (CRESTOR) 40 MG tablet, TAKE 1 TABLET BY MOUTH AT NIGHT, Disp: 90 tablet, Rfl: 1  •  torsemide (DEMADEX) 10 MG tablet, Take 1 tablet by mouth once daily, Disp: 90 tablet, Rfl: 2  •  Fluticasone  "Furoate-Vilanterol (BREO ELLIPTA) 200-25 MCG/INH inhaler, Inhale 1 puff Daily., Disp: 1 each, Rfl: 0    Allergies:   Allergies   Allergen Reactions   • Penicillins Hives       Physical Exam:  Vital Signs:   Vitals:    07/22/21 1431 07/22/21 1437 07/22/21 1438   BP: 122/70 120/64 104/74   BP Location: Right arm Left arm Left arm   Patient Position: Sitting Sitting Sitting   Cuff Size: Adult Adult Adult   Pulse: 89     SpO2: 96%     Weight: 76.2 kg (168 lb)     Height: 152.4 cm (60\")         Physical Exam  Constitutional:       General: She is not in acute distress.     Appearance: Normal appearance. She is well-developed. She is not diaphoretic.   HENT:      Head: Normocephalic and atraumatic.   Eyes:      General: No scleral icterus.     Pupils: Pupils are equal, round, and reactive to light.   Neck:      Trachea: No tracheal deviation.   Cardiovascular:      Rate and Rhythm: Normal rate and regular rhythm.      Heart sounds: Normal heart sounds. No murmur heard.   No friction rub. No gallop.       Comments: Normal JVD.  Pulmonary:      Effort: Pulmonary effort is normal. No respiratory distress.      Breath sounds: Normal breath sounds. No stridor. No wheezing or rales.   Chest:      Chest wall: No tenderness.   Abdominal:      General: Bowel sounds are normal. There is no distension.      Palpations: Abdomen is soft.      Tenderness: There is no abdominal tenderness. There is no guarding or rebound.   Musculoskeletal:         General: No swelling. Normal range of motion.      Cervical back: Neck supple. No tenderness.   Lymphadenopathy:      Cervical: No cervical adenopathy.   Skin:     General: Skin is warm and dry.      Findings: No erythema.   Neurological:      General: No focal deficit present.      Mental Status: She is alert and oriented to person, place, and time.   Psychiatric:         Mood and Affect: Mood normal.         Behavior: Behavior normal.         Results Review:   I reviewed the patient's new " clinical results.  I personally viewed and interpreted the patient's EKG/Telemetry data      ECG 12 Lead    Date/Time: 7/22/2021 3:38 PM  Performed by: Owen Urban MD  Authorized by: Owen Urban MD   Comparison: not compared with previous ECG   Rhythm: sinus rhythm  Rate: normal  QRS axis: normal  Other findings comments: Nonspecific T wave abnormality  Clinical impression comment: Borderline ECG              Assessment / Plan:     1. Palpitations  --Long history of palpitations, with symptoms suspicious for symptomatic ectopy  --Last cardiac monitor in 11/20 showed no significant arrhythmias or ectopy, however cardiac monitor 2016 did demonstrate PACs  --TSH in 10/2020 within normal limits  --Discussed a trial of low-dose metoprolol XL for suppression of ectopy, however given the patient's palpitations are currently mild and do not inhibit daily activities, deferred initiation of metoprolol for now  --Should palpitations increase in frequency, would start a trial of low-dose metoprolol  --Follow-up in 6 months to reevaluate symptoms    2.  Hypertension  --Controlled with current antihypertensives    3. Hyperlipidemia  --On statin      Follow Up:   Return in about 6 months (around 1/22/2022).      Thank you for allowing me to participate in the care of your patient. Please to not hesitate to contact me with additional questions or concerns.     EDUARDO Urban MD  Interventional Cardiology   07/22/2021  14:32 EDT

## 2021-08-04 ENCOUNTER — TELEPHONE (OUTPATIENT)
Dept: FAMILY MEDICINE CLINIC | Facility: CLINIC | Age: 51
End: 2021-08-04

## 2021-08-04 NOTE — TELEPHONE ENCOUNTER
Caller: SYLVIE OLVERA    Relationship to patient: Emergency Contact    Best call back number: [phone]    Characteristics of symptom/severity: FEVER, CHILLS, VOMITING AND BODY ACHS     How long have you been experiencing symptoms: ONE DAY     PATIENT RECEIVED THE COVID VACCINE YESTERDAY AND LAST NIGHT AND THIS MORNING THE PATIENT IS SICK- WANTS TO KNOW IF THIS IS NORMAL AFTER THE FIRST VACCINE. WANTS TO KNOW WHAT HE CAN DO FOR HIS WIFE.     If a prescription is needed, what is your preferred pharmacy:   [facility] [address] [place] [address] - [address] [place] [address] - [phone] [facility] [phone] [address] [address]  Phone: [phone] Fax: [phone]    [facility] [place] [address]  Phone: [phone] Fax: [phone]

## 2021-08-06 DIAGNOSIS — E78.01 FAMILIAL HYPERCHOLESTEROLEMIA: ICD-10-CM

## 2021-08-06 RX ORDER — ROSUVASTATIN CALCIUM 40 MG/1
TABLET, COATED ORAL
Qty: 90 TABLET | Refills: 0 | Status: SHIPPED | OUTPATIENT
Start: 2021-08-06 | End: 2021-11-22

## 2021-09-15 ENCOUNTER — OFFICE VISIT (OUTPATIENT)
Dept: GYNECOLOGIC ONCOLOGY | Facility: CLINIC | Age: 51
End: 2021-09-15

## 2021-09-15 VITALS
TEMPERATURE: 97.3 F | SYSTOLIC BLOOD PRESSURE: 121 MMHG | DIASTOLIC BLOOD PRESSURE: 68 MMHG | HEIGHT: 61 IN | WEIGHT: 170 LBS | HEART RATE: 62 BPM | OXYGEN SATURATION: 98 % | RESPIRATION RATE: 18 BRPM | BODY MASS INDEX: 32.1 KG/M2

## 2021-09-15 DIAGNOSIS — L29.2 VULVAR PRURITUS: ICD-10-CM

## 2021-09-15 DIAGNOSIS — L90.0 LICHEN SCLEROSUS ET ATROPHICUS: Primary | ICD-10-CM

## 2021-09-15 DIAGNOSIS — C51.9 VULVAR CANCER, CARCINOMA (HCC): ICD-10-CM

## 2021-09-15 PROCEDURE — 99212 OFFICE O/P EST SF 10 MIN: CPT | Performed by: OBSTETRICS & GYNECOLOGY

## 2021-09-15 RX ORDER — CLOBETASOL PROPIONATE 0.5 MG/G
1 CREAM TOPICAL AS NEEDED
COMMUNITY
End: 2022-01-10 | Stop reason: SDUPTHER

## 2021-09-15 NOTE — PROGRESS NOTES
"GYN ONCOLOGY CANCER SURVEILLANCE FOLLOW-UP    Edyta Franco  [id number]  1970    Chief Complaint: Follow-up (Vulvar Cancer)        History of present illness:  Edyta Franco is a 51 y.o. year old female who is here today for ongoing surveillance of Vulvar Cancer and significant history of symptomatic lichen sclerosus. States previous right vulvar lesion has resolved. Does report new pain in right vulva that she says feels like it is \"deep inside the wall.\" Has not noticed any new lesions there but does notice worsening pain when she contracts to void. States that this pain has only been present for a few days but is similar to prior musculoskeletal pain she has had in the area. Has going to pelvic floor PT which has really helped her symptoms. Is considering returning again. Denies any abnormal vaginal bleeding/discharge. Has been using clobetasol TID with lichen sclerosis flares and topical estrogen as needed. Doing well otherwise.       Cancer History:   Oncology/Hematology History   Vulvar cancer, carcinoma (CMS/HCC)   8/14/2017 Biopsy    History of lichens sclerosus, on clobetasol therapy. Pt presented to Dr. Abigail Fong with c/o painful ulcerative left vulvar lesion. Biopsy revealed invasive SCC, extending into deep margin at least 3.5 mm. Referred to Gyn Oncology     8/30/2017 Surgery    Modified radical vulvectomy, left inguinofemoral sentinel lymph node dissection, right inguinofemoral lymph node dissection, and mapping biopsies of the vulva. Surgery by Dr. Nassar and Dr. Bree Russell.     Pathology revealed SCC, focally keratinizing with 0.3 cm invasion. LVSI negative, lymph nodes negative, mapping biopsies negative. Stage IB grade 2       9/24/2017 - 9/27/2017 Other Event    Inpatient hospital admission for inguinal incision cellulitis, inguinal fluid collection, and SIRS. Treated with IV antibiotics and sent home on oral antibiotics.      10/26/2017 - 10/27/2017 Other Event    2 day " inpatient admission for recurrent cellulitis, resolved with antibiotic management         Past Medical History:   Diagnosis Date   • Acute upper respiratory infection    • Body piercing 2020    ears   • Elevated cholesterol    • Epilepsy (CMS/HCC) 2020    Patient reported she was taken off her medication for this apx 10 years ago.  Reported no seizure activity for apx. 8 years ago.     • Fatigue    • H/O exercise stress test 2020    Patient reported done 3-4 years ago and that all was wnl's at that time   • History of bronchitis 2020   • History of foot fracture 2020    Hx left foot - no surgery required   • History of pneumonia 2020   • Hypercholesteremia    • Hypertension    • Influenza    • Lichen sclerosus of female genitalia    • Ovarian follicular cyst    • Rectal bleeding 2020   • Sinusitis    • Sore throat    • UTI (urinary tract infection) 10/2/2017   • Vulva cancer (CMS/[facility])    • Wears contact lenses 2020    Patient advised contacts will need to be removed DOS       Past Surgical History:   Procedure Laterality Date   •  SECTION     • COLONOSCOPY N/A 3/23/2020    Procedure: COLONOSCOPY WITH BIOPSY;  Surgeon: Eleni Russell MD;  Location: [facility] ENDOSCOPY;  Service: Gastroenterology;  Laterality: N/A;   • HAND SURGERY Right 2014   • LUMBAR LAMINECTOMY  2011    HEMILAMI RT-C7-T1, RT C7-T1 MEDIAL FACET & CT-T1 DISC (Dr. Cabrera   • ORIF ANKLE FRACTURE Right    • TOTAL ABDOMINAL HYSTERECTOMY WITH SALPINGO OOPHORECTOMY      Dr. Richard Armendariz - [facility]    • VULVECTOMY N/A 2017    Procedure: MODIFIED RADICAL VULVECTOMY, BILATERAL SENTINAL LYMPH NODE DISSECTION, MAPPING BIOPSIES OF VULVA;  Surgeon: Isabela Nassar MD;  Location: [facility] OR;  Service:        MEDICATIONS: The current medication list was reviewed and reconciled.     Allergies:  is allergic to penicillins.    Family History   Problem Relation Age of Onset   • Diabetes  "Mother    • Congenital heart disease Mother    • Hypertension Mother    • Stroke Mother    • Hyperlipidemia Mother    • Diabetes Father    • Congenital heart disease Father    • Hypertension Father    • Hyperlipidemia Father    • Heart attack Other    • Hyperlipidemia Other    • Cancer Neg Hx    • Breast cancer Neg Hx        Last imaging study was CT abdomen pelvis 10/26/2017.     Review of Systems   Constitutional: Negative for appetite change, chills, fatigue, fever and unexpected weight change.   HENT: Negative for congestion and sore throat.    Eyes: Negative for visual disturbance.   Respiratory: Negative for cough, shortness of breath and wheezing.    Cardiovascular: Negative for chest pain, palpitations and leg swelling.   Gastrointestinal: Negative for abdominal distention, abdominal pain, blood in stool, constipation, diarrhea, nausea and vomiting.   Endocrine: Negative.    Genitourinary: Positive for dysuria, genital sores (new lesion to left vulva) and vaginal pain. Negative for dyspareunia, frequency, hematuria, pelvic pain, urgency, vaginal bleeding and vaginal discharge.   Musculoskeletal: Negative for arthralgias, gait problem and joint swelling.   Neurological: Negative for dizziness, seizures, syncope, weakness, light-headedness, numbness and headaches.   Psychiatric/Behavioral: Negative.  Negative for behavioral problems.         Physical Exam  Genitourinary:         Comments: #1 is within a linear distribution of white scar as noted in red      Vital Signs: /68 Comment: LUE  Pulse 62   Temp 97.3 °F (36.3 °C) (Infrared)   Resp 18   Ht 154.9 cm (61\")   Wt 77.1 kg (170 lb)   LMP  (LMP Unknown)   SpO2 98% Comment: RA  BMI 32.12 kg/m²   Vitals:    09/15/21 1518   PainSc: 6  Comment: Vulva           General Appearance:  alert, cooperative, no apparent distress and appears stated age   Neurologic/Psychiatric: A&O x 3, gait steady, appropriate affect   HEENT:  Normocephalic, without obvious " abnormality, mucous membranes moist   Lungs:    Deferred   Heart:   Deferred   Breasts:  deferred   Abdomen:   Soft, non-tender, non-distended and no organomegaly   Lymph nodes: No inguinal adenopathy noted   Extremities: Normal, atraumatic; no clubbing, cyanosis, or edema    Pelvic: External Genitalia   Show changes consistent with lichen sclerosis that is diffuse.  This involves the clitoral middleton, medial labium majora, and labia minora.  There is agglutination of the labia minora.  Refer to diagram for more detail.     ECOG Performance Status: (0) Fully Active - Able to Carry On All Pre-disease Performance Without Restriction    Procedure Note:  None        Assessment and Plan:  51 y.o. with history of vulvar cancer and lichen sclerosis here for follow up    There are no diagnoses linked to this encounter.    History of Vulvar Cancer  Lichen Sclerosis   - no new lesions visualized on exam today  - continue topical estrogen and clobetasol as needed  - encouraged patient to call if she has persistent symptoms that do not improve    Pain assessment was performed today as a part of patient’s care.  For patients with pain related to surgery, gynecologic malignancy or cancer treatment, the plan is as noted in the assessment/plan.  For patients with pain not related to these issues, they are to seek any further needed care from a more appropriate provider, such as PCP.      RTC: 3 months to see Yamile Robison, anticipate patient would continue annual examinations with Dr. Fong    Patient was seen and examined with Dr. John,  resident, who performed portions of the examination and documentation for this patient's care under my direct supervision.  I agree with the above documentation and plan.    Isabela Nassar MD  09/15/21  16:36 EDT

## 2021-10-18 ENCOUNTER — APPOINTMENT (OUTPATIENT)
Dept: CT IMAGING | Facility: HOSPITAL | Age: 51
End: 2021-10-18

## 2021-10-18 ENCOUNTER — HOSPITAL ENCOUNTER (EMERGENCY)
Facility: HOSPITAL | Age: 51
Discharge: HOME | End: 2021-10-18
Attending: EMERGENCY MEDICINE | Admitting: EMERGENCY MEDICINE

## 2021-10-18 VITALS
BODY MASS INDEX: 30.36 KG/M2 | DIASTOLIC BLOOD PRESSURE: 65 MMHG | TEMPERATURE: 98.8 F | WEIGHT: 165 LBS | HEIGHT: 62 IN | SYSTOLIC BLOOD PRESSURE: 118 MMHG | OXYGEN SATURATION: 98 % | HEART RATE: 71 BPM | RESPIRATION RATE: 18 BRPM

## 2021-10-18 DIAGNOSIS — S70.02XA HEMATOMA OF LEFT HIP, INITIAL ENCOUNTER: Primary | ICD-10-CM

## 2021-10-18 LAB
ALBUMIN SERPL-MCNC: 4.7 G/DL (ref 3.5–5.2)
ALBUMIN/GLOB SERPL: 2 G/DL
ALP SERPL-CCNC: 52 U/L (ref 39–117)
ALT SERPL W P-5'-P-CCNC: 25 U/L (ref 1–33)
ANION GAP SERPL CALCULATED.3IONS-SCNC: 12.2 MMOL/L (ref 5–15)
AST SERPL-CCNC: 25 U/L (ref 1–32)
BASOPHILS # BLD AUTO: 0.07 10*3/MM3 (ref 0–0.2)
BASOPHILS NFR BLD AUTO: 0.8 % (ref 0–1.5)
BILIRUB SERPL-MCNC: 0.3 MG/DL (ref 0–1.2)
BILIRUB UR QL STRIP: NEGATIVE
BUN SERPL-MCNC: 11 MG/DL (ref 6–20)
BUN/CREAT SERPL: 13.9 (ref 7–25)
CALCIUM SPEC-SCNC: 8.8 MG/DL (ref 8.6–10.5)
CHLORIDE SERPL-SCNC: 102 MMOL/L (ref 98–107)
CLARITY UR: CLEAR
CO2 SERPL-SCNC: 26.8 MMOL/L (ref 22–29)
COLOR UR: YELLOW
CREAT SERPL-MCNC: 0.79 MG/DL (ref 0.57–1)
DEPRECATED RDW RBC AUTO: 39.9 FL (ref 37–54)
EOSINOPHIL # BLD AUTO: 0.13 10*3/MM3 (ref 0–0.4)
EOSINOPHIL NFR BLD AUTO: 1.5 % (ref 0.3–6.2)
ERYTHROCYTE [DISTWIDTH] IN BLOOD BY AUTOMATED COUNT: 12 % (ref 12.3–15.4)
GFR SERPL CREATININE-BSD FRML MDRD: 77 ML/MIN/1.73
GLOBULIN UR ELPH-MCNC: 2.4 GM/DL
GLUCOSE SERPL-MCNC: 91 MG/DL (ref 65–99)
GLUCOSE UR STRIP-MCNC: NEGATIVE MG/DL
HCT VFR BLD AUTO: 42.6 % (ref 34–46.6)
HGB BLD-MCNC: 14.3 G/DL (ref 12–15.9)
HGB UR QL STRIP.AUTO: NEGATIVE
HOLD SPECIMEN: NORMAL
HOLD SPECIMEN: NORMAL
IMM GRANULOCYTES # BLD AUTO: 0.02 10*3/MM3 (ref 0–0.05)
IMM GRANULOCYTES NFR BLD AUTO: 0.2 % (ref 0–0.5)
KETONES UR QL STRIP: NEGATIVE
LEUKOCYTE ESTERASE UR QL STRIP.AUTO: NEGATIVE
LIPASE SERPL-CCNC: 24 U/L (ref 13–60)
LYMPHOCYTES # BLD AUTO: 2.49 10*3/MM3 (ref 0.7–3.1)
LYMPHOCYTES NFR BLD AUTO: 29.1 % (ref 19.6–45.3)
MCH RBC QN AUTO: 30.8 PG (ref 26.6–33)
MCHC RBC AUTO-ENTMCNC: 33.6 G/DL (ref 31.5–35.7)
MCV RBC AUTO: 91.8 FL (ref 79–97)
MONOCYTES # BLD AUTO: 0.52 10*3/MM3 (ref 0.1–0.9)
MONOCYTES NFR BLD AUTO: 6.1 % (ref 5–12)
NEUTROPHILS NFR BLD AUTO: 5.33 10*3/MM3 (ref 1.7–7)
NEUTROPHILS NFR BLD AUTO: 62.3 % (ref 42.7–76)
NITRITE UR QL STRIP: NEGATIVE
NRBC BLD AUTO-RTO: 0 /100 WBC (ref 0–0.2)
PH UR STRIP.AUTO: 7 [PH] (ref 5–8)
PLATELET # BLD AUTO: 253 10*3/MM3 (ref 140–450)
PMV BLD AUTO: 9.6 FL (ref 6–12)
POTASSIUM SERPL-SCNC: 4 MMOL/L (ref 3.5–5.2)
PROT SERPL-MCNC: 7.1 G/DL (ref 6–8.5)
PROT UR QL STRIP: NEGATIVE
RBC # BLD AUTO: 4.64 10*6/MM3 (ref 3.77–5.28)
SODIUM SERPL-SCNC: 141 MMOL/L (ref 136–145)
SP GR UR STRIP: 1.02 (ref 1–1.03)
UROBILINOGEN UR QL STRIP: NORMAL
WBC # BLD AUTO: 8.56 10*3/MM3 (ref 3.4–10.8)
WHOLE BLOOD HOLD SPECIMEN: NORMAL
WHOLE BLOOD HOLD SPECIMEN: NORMAL

## 2021-10-18 PROCEDURE — 25010000002 IOPAMIDOL 61 % SOLUTION: Performed by: EMERGENCY MEDICINE

## 2021-10-18 PROCEDURE — 83690 ASSAY OF LIPASE: CPT | Performed by: PHYSICIAN ASSISTANT

## 2021-10-18 PROCEDURE — 85025 COMPLETE CBC W/AUTO DIFF WBC: CPT | Performed by: PHYSICIAN ASSISTANT

## 2021-10-18 PROCEDURE — 99283 EMERGENCY DEPT VISIT LOW MDM: CPT

## 2021-10-18 PROCEDURE — 81003 URINALYSIS AUTO W/O SCOPE: CPT | Performed by: PHYSICIAN ASSISTANT

## 2021-10-18 PROCEDURE — 74177 CT ABD & PELVIS W/CONTRAST: CPT

## 2021-10-18 PROCEDURE — 96360 HYDRATION IV INFUSION INIT: CPT

## 2021-10-18 PROCEDURE — 80053 COMPREHEN METABOLIC PANEL: CPT | Performed by: PHYSICIAN ASSISTANT

## 2021-10-18 RX ORDER — SODIUM CHLORIDE 0.9 % (FLUSH) 0.9 %
10 SYRINGE (ML) INJECTION AS NEEDED
Status: DISCONTINUED | OUTPATIENT
Start: 2021-10-18 | End: 2021-10-18 | Stop reason: HOSPADM

## 2021-10-18 RX ADMIN — IOPAMIDOL 100 ML: 612 INJECTION, SOLUTION INTRAVENOUS at 15:47

## 2021-10-18 RX ADMIN — SODIUM CHLORIDE 1000 ML: 9 INJECTION, SOLUTION INTRAVENOUS at 14:35

## 2021-10-18 NOTE — ED PROVIDER NOTES
Subjective   51-year-old female presents with a bruise in her left groin..  She states she woke up with a small bruise on her left hip and groin area, no injury.  She does have a history of vulvar cancer, has had surgery in that area and lymph node removal.  This was several years ago, she states yesterday it was uneventful no known injury she just woke up with a bruise and went to urgent treatment center, due to her history this recommended she come here for scans.  She is not on blood thinners.      History provided by:  Patient   used: No        Review of Systems   Skin:        Bruise in left hip and groin.   All other systems reviewed and are negative.      Past Medical History:   Diagnosis Date   • Acute upper respiratory infection    • Body piercing 2020    ears   • Elevated cholesterol    • Epilepsy (HCC) 2020    Patient reported she was taken off her medication for this apx 10 years ago.  Reported no seizure activity for apx. 8 years ago.     • Fatigue    • H/O exercise stress test 2020    Patient reported done 3-4 years ago and that all was wnl's at that time   • History of bronchitis 2020   • History of foot fracture 2020    Hx left foot - no surgery required   • History of pneumonia 2020   • Hypercholesteremia    • Hypertension    • Influenza    • Lichen sclerosus of female genitalia    • Ovarian follicular cyst    • Rectal bleeding 2020   • Sinusitis    • Sore throat    • UTI (urinary tract infection) 10/2/2017   • Vulva cancer (HCC)    • Wears contact lenses 2020    Patient advised contacts will need to be removed DOS       Allergies   Allergen Reactions   • Penicillins Hives       Past Surgical History:   Procedure Laterality Date   •  SECTION     • COLONOSCOPY N/A 3/23/2020    Procedure: COLONOSCOPY WITH BIOPSY;  Surgeon: Eleni Russell MD;  Location: [facility] ENDOSCOPY;  Service: Gastroenterology;  Laterality: N/A;   • HAND  SURGERY Right 2014   • LUMBAR LAMINECTOMY  12/13/2011    HEMILAMI RT-C7-T1, RT C7-T1 MEDIAL FACET & CT-T1 DISC (Dr. Cabrera   • ORIF ANKLE FRACTURE Right    • TOTAL ABDOMINAL HYSTERECTOMY WITH SALPINGO OOPHORECTOMY  2001    Dr. Richard Armendariz - [facility]    • VULVECTOMY N/A 8/30/2017    Procedure: MODIFIED RADICAL VULVECTOMY, BILATERAL SENTINAL LYMPH NODE DISSECTION, MAPPING BIOPSIES OF VULVA;  Surgeon: Isabela Nasasr MD;  Location: [facility];  Service:        Family History   Problem Relation Age of Onset   • Diabetes Mother    • Congenital heart disease Mother    • Hypertension Mother    • Stroke Mother    • Hyperlipidemia Mother    • Diabetes Father    • Congenital heart disease Father    • Hypertension Father    • Hyperlipidemia Father    • Heart attack Other    • Hyperlipidemia Other    • Cancer Neg Hx    • Breast cancer Neg Hx        Social History     Socioeconomic History   • Marital status:    • Number of children: 2   Tobacco Use   • Smoking status: Never Smoker   • Smokeless tobacco: Never Used   Vaping Use   • Vaping Use: Never used   Substance and Sexual Activity   • Alcohol use: Yes     Comment: Reported rare use, no history of abuse   • Drug use: No   • Sexual activity: Yes     Partners: Male           Objective   Physical Exam  Vitals and nursing note reviewed.   Constitutional:       Appearance: She is well-developed.   HENT:      Head: Normocephalic.   Cardiovascular:      Rate and Rhythm: Normal rate and regular rhythm.   Pulmonary:      Effort: Pulmonary effort is normal.      Breath sounds: Normal breath sounds.   Abdominal:          Comments: 5 centimeter purple   Musculoskeletal:         General: Normal range of motion.      Cervical back: Normal range of motion and neck supple.   Skin:     General: Skin is warm and dry.   Neurological:      Mental Status: She is alert and oriented to person, place, and time.      Deep Tendon Reflexes: Reflexes are normal and symmetric.          Procedures           ED Course                                           MDM  Number of Diagnoses or Management Options  Hematoma of left hip, initial encounter: new and requires workup     Amount and/or Complexity of Data Reviewed  Clinical lab tests: reviewed  Tests in the radiology section of CPT®: reviewed    Risk of Complications, Morbidity, and/or Mortality  Presenting problems: minimal  Diagnostic procedures: minimal  Management options: minimal    Patient Progress  Patient progress: stable      Final diagnoses:   Hematoma of left hip, initial encounter       ED Disposition  ED Disposition     ED Disposition Condition Comment    Discharge Stable           [facility] Emergency Department  [address] [facility] [address]  [phone]    If symptoms worsen         Medication List      No changes were made to your prescriptions during this visit.          Shukri Jauregui Jr., PA-C  10/18/21 2808

## 2021-11-22 DIAGNOSIS — E78.01 FAMILIAL HYPERCHOLESTEROLEMIA: ICD-10-CM

## 2021-11-22 RX ORDER — ROSUVASTATIN CALCIUM 40 MG/1
TABLET, COATED ORAL
Qty: 90 TABLET | Refills: 0 | Status: SHIPPED | OUTPATIENT
Start: 2021-11-22 | End: 2022-04-27

## 2022-01-07 PROCEDURE — U0004 COV-19 TEST NON-CDC HGH THRU: HCPCS | Performed by: NURSE PRACTITIONER

## 2022-01-10 ENCOUNTER — TELEPHONE (OUTPATIENT)
Dept: GYNECOLOGIC ONCOLOGY | Facility: CLINIC | Age: 52
End: 2022-01-10

## 2022-01-10 NOTE — TELEPHONE ENCOUNTER
Caller: Edyta Franco    Relationship: Self    Best call back number: [phone]    Requested Prescriptions:   Requested Prescriptions     Pending Prescriptions Disp Refills   • clobetasol prop emollient base (TEMOVATE) 0.05 % emollient cream       Sig: Apply 1 application topically to the appropriate area as directed As Needed.   • estradiol (ESTRACE) 0.1 MG/GM vaginal cream 43 g 5     Sig: Place small amount cream on vulvar lesion 2-3 times per week        Pharmacy where request should be sent: [address] [phone] [facility] [phone] [facility]     Additional details provided by patient PATIENT IS OUT    Does the patient have less than a 3 day supply:  [x] Yes  [] No    Maciej Aguayo Rep   01/10/22 13:57 EST

## 2022-01-11 RX ORDER — CLOBETASOL PROPIONATE 0.5 MG/G
1 CREAM TOPICAL AS NEEDED
Qty: 30 G | Refills: 2 | Status: SHIPPED | OUTPATIENT
Start: 2022-01-11 | End: 2022-05-26 | Stop reason: SDUPTHER

## 2022-01-11 RX ORDER — ESTRADIOL 0.1 MG/G
CREAM VAGINAL
Qty: 43 G | Refills: 5 | Status: SHIPPED | OUTPATIENT
Start: 2022-01-11 | End: 2022-05-26 | Stop reason: SDUPTHER

## 2022-01-31 PROCEDURE — U0004 COV-19 TEST NON-CDC HGH THRU: HCPCS | Performed by: PERSONAL EMERGENCY RESPONSE ATTENDANT

## 2022-03-07 DIAGNOSIS — I10 HYPERTENSION, ESSENTIAL, BENIGN: ICD-10-CM

## 2022-03-07 RX ORDER — LISINOPRIL 10 MG/1
TABLET ORAL
Qty: 90 TABLET | Refills: 0 | Status: SHIPPED | OUTPATIENT
Start: 2022-03-07 | End: 2022-08-08

## 2022-04-27 DIAGNOSIS — E78.01 FAMILIAL HYPERCHOLESTEROLEMIA: ICD-10-CM

## 2022-04-27 RX ORDER — ROSUVASTATIN CALCIUM 40 MG/1
TABLET, COATED ORAL
Qty: 90 TABLET | Refills: 0 | Status: SHIPPED | OUTPATIENT
Start: 2022-04-27 | End: 2022-10-25

## 2022-05-06 ENCOUNTER — OFFICE VISIT (OUTPATIENT)
Dept: FAMILY MEDICINE CLINIC | Facility: CLINIC | Age: 52
End: 2022-05-06

## 2022-05-06 VITALS
DIASTOLIC BLOOD PRESSURE: 74 MMHG | WEIGHT: 174.2 LBS | BODY MASS INDEX: 32.89 KG/M2 | OXYGEN SATURATION: 98 % | TEMPERATURE: 98.2 F | SYSTOLIC BLOOD PRESSURE: 120 MMHG | HEART RATE: 68 BPM | HEIGHT: 61 IN

## 2022-05-06 DIAGNOSIS — G57.51 TARSAL TUNNEL SYNDROME OF RIGHT SIDE: Primary | ICD-10-CM

## 2022-05-06 DIAGNOSIS — M25.571 ARTHRALGIA OF ANKLE, RIGHT: ICD-10-CM

## 2022-05-06 PROCEDURE — 99213 OFFICE O/P EST LOW 20 MIN: CPT | Performed by: FAMILY MEDICINE

## 2022-05-09 ENCOUNTER — OFFICE VISIT (OUTPATIENT)
Dept: FAMILY MEDICINE CLINIC | Facility: CLINIC | Age: 52
End: 2022-05-09

## 2022-05-09 ENCOUNTER — TELEPHONE (OUTPATIENT)
Dept: FAMILY MEDICINE CLINIC | Facility: CLINIC | Age: 52
End: 2022-05-09

## 2022-05-09 VITALS
OXYGEN SATURATION: 98 % | SYSTOLIC BLOOD PRESSURE: 122 MMHG | BODY MASS INDEX: 32.85 KG/M2 | HEART RATE: 64 BPM | HEIGHT: 61 IN | DIASTOLIC BLOOD PRESSURE: 80 MMHG | TEMPERATURE: 100.1 F | WEIGHT: 174 LBS

## 2022-05-09 DIAGNOSIS — R05.9 COUGH: ICD-10-CM

## 2022-05-09 DIAGNOSIS — J40 BRONCHITIS: ICD-10-CM

## 2022-05-09 DIAGNOSIS — J02.9 ACUTE PHARYNGITIS, UNSPECIFIED ETIOLOGY: ICD-10-CM

## 2022-05-09 DIAGNOSIS — J02.9 SORE THROAT: ICD-10-CM

## 2022-05-09 DIAGNOSIS — R50.9 FEVER, UNSPECIFIED FEVER CAUSE: Primary | ICD-10-CM

## 2022-05-09 LAB
EXPIRATION DATE: NORMAL
EXPIRATION DATE: NORMAL
FLUAV AG UPPER RESP QL IA.RAPID: NOT DETECTED
FLUBV AG UPPER RESP QL IA.RAPID: NOT DETECTED
INTERNAL CONTROL: NORMAL
INTERNAL CONTROL: NORMAL
Lab: NORMAL
Lab: NORMAL
S PYO AG THROAT QL: NEGATIVE
SARS-COV-2 AG UPPER RESP QL IA.RAPID: NOT DETECTED

## 2022-05-09 PROCEDURE — 87428 SARSCOV & INF VIR A&B AG IA: CPT | Performed by: FAMILY MEDICINE

## 2022-05-09 PROCEDURE — 87880 STREP A ASSAY W/OPTIC: CPT | Performed by: FAMILY MEDICINE

## 2022-05-09 PROCEDURE — 99213 OFFICE O/P EST LOW 20 MIN: CPT | Performed by: FAMILY MEDICINE

## 2022-05-09 RX ORDER — DEXAMETHASONE 4 MG/1
4 TABLET ORAL
Qty: 3 TABLET | Refills: 0 | Status: SHIPPED | OUTPATIENT
Start: 2022-05-09 | End: 2022-05-12

## 2022-05-09 RX ORDER — DOXYCYCLINE 100 MG/1
100 TABLET ORAL 2 TIMES DAILY
Qty: 20 TABLET | Refills: 0 | Status: SHIPPED | OUTPATIENT
Start: 2022-05-09 | End: 2022-05-19

## 2022-05-09 NOTE — TELEPHONE ENCOUNTER
SUSANNAH FROM [facility] STATES THAT THE PHARMACY IS  UNABLE TO FILL RX TUSSIONEX.

## 2022-05-09 NOTE — TELEPHONE ENCOUNTER
Spoke with Pharmacy.  They do not have Tussionex.  It is on back order.  The pharmacist said he does not know when/if they will get it.  They have not had it this year.

## 2022-05-09 NOTE — PROGRESS NOTES
Subjective   Edyta Franco is a 51 y.o. female.     She c/o fever    Fever   This is a new problem. The current episode started yesterday. The problem occurs intermittently. The problem has been waxing and waning. The maximum temperature noted was 102 to 102.9 F. Associated symptoms include congestion, coughing, ear pain, headaches and a sore throat. Pertinent negatives include no abdominal pain, chest pain, diarrhea, muscle aches, nausea, rash, urinary pain, vomiting or wheezing. She has tried acetaminophen for the symptoms. The treatment provided mild relief.   Risk factors: sick contacts        The following portions of the patient's history were reviewed and updated as appropriate: allergies, current medications, past family history, past medical history, past social history, past surgical history and problem list.    Review of Systems   Constitutional: Positive for appetite change, fatigue and fever.   HENT: Positive for congestion, ear pain, postnasal drip and sore throat. Negative for mouth sores, nosebleeds, rhinorrhea, sinus pain and trouble swallowing.    Eyes: Negative for pain, discharge and redness.   Respiratory: Positive for cough and shortness of breath (mild). Negative for wheezing.    Cardiovascular: Negative for chest pain.   Gastrointestinal: Negative for abdominal pain, diarrhea, nausea and vomiting.   Genitourinary: Negative for dysuria.   Musculoskeletal: Positive for myalgias.   Skin: Negative for rash.   Neurological: Positive for weakness (generalized) and headaches.   Hematological: Positive for adenopathy (neck).   Psychiatric/Behavioral: Positive for sleep disturbance. Negative for confusion.       Objective    Vitals:    05/09/22 1047   BP: 122/80   Pulse: 64   Temp: 100.1 °F (37.8 °C)   SpO2: 98%     Body mass index is 32.88 kg/m².      05/09/22  1047   Weight: 78.9 kg (174 lb)       Physical Exam  Vitals and nursing note reviewed.   Constitutional:       General: She is not in  acute distress.     Appearance: She is well-developed and well-groomed. She is ill-appearing.   HENT:      Right Ear: Tympanic membrane, ear canal and external ear normal.      Left Ear: Tympanic membrane, ear canal and external ear normal.      Nose: Congestion present. No rhinorrhea.      Mouth/Throat:      Mouth: Mucous membranes are moist. No oral lesions.      Pharynx: Posterior oropharyngeal erythema present. No oropharyngeal exudate.   Eyes:      Conjunctiva/sclera: Conjunctivae normal.   Cardiovascular:      Rate and Rhythm: Normal rate and regular rhythm.      Pulses: Normal pulses.      Heart sounds: Normal heart sounds.   Pulmonary:      Effort: Pulmonary effort is normal. No respiratory distress (deep breathing triggers coughing spell particularly on expiration).      Breath sounds: Decreased breath sounds present. No wheezing, rhonchi or rales.   Musculoskeletal:      Right lower leg: No edema.      Left lower leg: No edema.   Lymphadenopathy:      Cervical: Cervical adenopathy (shoddy) present.      Right cervical: Superficial cervical adenopathy present.      Left cervical: Superficial cervical adenopathy present.   Skin:     General: Skin is warm and dry.      Findings: No rash.   Neurological:      Mental Status: She is alert and oriented to person, place, and time.      Gait: Gait is intact.   Psychiatric:         Behavior: Behavior normal. Behavior is cooperative.         Cognition and Memory: Cognition normal.       Recent Results (from the past 168 hour(s))   POCT SARS-CoV-2 Antigen RANJAN + Flu    Collection Time: 05/09/22 11:08 AM    Specimen: Swab   Result Value Ref Range    SARS Antigen Not Detected Not Detected, Presumptive Negative    Influenza A Antigen RANJAN Not Detected Not Detected    Influenza B Antigen RANJAN Not Detected Not Detected    Internal Control Passed Passed    Lot Number [id number]     Expiration Date 02/04/2023    POCT rapid strep A    Collection Time: 05/09/22 11:08 AM     Specimen: Swab   Result Value Ref Range    Rapid Strep A Screen Negative Negative, VALID, INVALID, Not Performed    Internal Control Passed Passed    Lot Number [id number]     Expiration Date 05/31/2022          Diagnoses and all orders for this visit:    1. Fever, unspecified fever cause (Primary)  -     POCT SARS-CoV-2 Antigen RANJAN + Flu  -     POCT rapid strep A    2. Cough  -     HYDROcod Polst-CPM Polst ER (Tussionex Pennkinetic ER) 10-8 MG/5ML ER suspension; Take 5 mL by mouth Every 12 (Twelve) Hours As Needed for Cough.  Dispense: 100 mL; Refill: 0    3. Sore throat    4. Acute pharyngitis, unspecified etiology    5. Bronchitis  -     HYDROcod Polst-CPM Polst ER (Tussionex Pennkinetic ER) 10-8 MG/5ML ER suspension; Take 5 mL by mouth Every 12 (Twelve) Hours As Needed for Cough.  Dispense: 100 mL; Refill: 0    Other orders  -     doxycycline (ADOXA) 100 MG tablet; Take 1 tablet by mouth 2 (Two) Times a Day for 10 days.  Dispense: 20 tablet; Refill: 0  -     dexamethasone (DECADRON) 4 MG tablet; Take 1 tablet by mouth Daily With Breakfast for 3 days.  Dispense: 3 tablet; Refill: 0       Clinically, symptoms appear consistent with group A beta-hemolytic strep.  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  Antibiotics as above based on her medication allergies/tolerance history.    Also appears to have concomitant acute bronchitis.  Symptom mgnt reviewed.    May require retesting for COVID prior to returning to work if symptoms persist, particularly fever.    Symptom management reviewed.  Work excuse provided.  Encouraged regular follow-up with PCP.  Otherwise follow-up as needed.  Patient was encouraged to keep me informed of any acute changes, lack of improvement, or any new concerning symptoms.  Pt is aware of reasons to seek emergent care.  Patient voiced understanding of all instructions and denied further questions.  As part of patient's treatment plan I am prescribing a controlled  substance.  The patient has been made aware of the appropriate use of such medications, including potential risk of somnolence, limited ability to drive and/or work safely, and potential for dependence and/or overdose.  It has also been made clear that these medications are for use by this patient only, without concomitant use of alcohol or other substances, unless prescribed.  GUERITA reviewed.    Please note that portions of this note may have been completed with a voice recognition program. Efforts were made to edit the dictations, but occasionally words are mistranscribed.

## 2022-05-18 RX ORDER — ESTRADIOL 0.5 MG/1
TABLET ORAL
Qty: 90 TABLET | Refills: 0 | Status: SHIPPED | OUTPATIENT
Start: 2022-05-18 | End: 2022-05-26 | Stop reason: SDUPTHER

## 2022-05-26 ENCOUNTER — OFFICE VISIT (OUTPATIENT)
Dept: OBSTETRICS AND GYNECOLOGY | Facility: CLINIC | Age: 52
End: 2022-05-26

## 2022-05-26 VITALS
DIASTOLIC BLOOD PRESSURE: 78 MMHG | HEIGHT: 61 IN | WEIGHT: 169 LBS | BODY MASS INDEX: 31.91 KG/M2 | SYSTOLIC BLOOD PRESSURE: 102 MMHG

## 2022-05-26 DIAGNOSIS — C51.9 VULVAR CANCER, CARCINOMA: ICD-10-CM

## 2022-05-26 DIAGNOSIS — Z01.419 WOMEN'S ANNUAL ROUTINE GYNECOLOGICAL EXAMINATION: ICD-10-CM

## 2022-05-26 DIAGNOSIS — C51.9 VULVAR CANCER: Primary | ICD-10-CM

## 2022-05-26 DIAGNOSIS — L90.0 LICHEN SCLEROSUS ET ATROPHICUS: ICD-10-CM

## 2022-05-26 DIAGNOSIS — Z79.890 HORMONE REPLACEMENT THERAPY (HRT): ICD-10-CM

## 2022-05-26 PROBLEM — L03.314 CELLULITIS OF GROIN, RIGHT: Status: RESOLVED | Noted: 2017-10-26 | Resolved: 2022-05-26

## 2022-05-26 PROBLEM — N39.0 UTI (URINARY TRACT INFECTION): Status: RESOLVED | Noted: 2017-10-02 | Resolved: 2022-05-26

## 2022-05-26 PROBLEM — N90.89 VULVAR LESION: Status: RESOLVED | Noted: 2021-06-16 | Resolved: 2022-05-26

## 2022-05-26 PROCEDURE — 99396 PREV VISIT EST AGE 40-64: CPT | Performed by: OBSTETRICS & GYNECOLOGY

## 2022-05-26 RX ORDER — ESTRADIOL 0.5 MG/1
0.5 TABLET ORAL DAILY
Qty: 90 TABLET | Refills: 3 | Status: SHIPPED | OUTPATIENT
Start: 2022-05-26

## 2022-05-26 RX ORDER — CLOBETASOL PROPIONATE 0.5 MG/G
1 OINTMENT TOPICAL 2 TIMES DAILY
Qty: 45 G | Refills: 3 | Status: SHIPPED | OUTPATIENT
Start: 2022-05-26 | End: 2023-03-29

## 2022-05-26 RX ORDER — ESTRADIOL 0.1 MG/G
CREAM VAGINAL
Qty: 43 G | Refills: 5 | Status: SHIPPED | OUTPATIENT
Start: 2022-05-26 | End: 2023-03-29

## 2022-05-26 NOTE — PROGRESS NOTES
GYN Annual Exam     CC - Here for annual exam.        HPI  Edyta Franco is a 51 y.o. female, , who presents for annual well woman exam.  She is postmenopausal.  Patient denies vaginal bleeding. ..  Patient reports problems with: dysparenuria. There were no changes to her medical or surgical history since her last visit.. Partner Status: Marital Status: .  New Partners since last visit: no.continues to follow with dr johnson after vulvectomy. Her lichen sclerosis is managed with topical clobetesol and estradiol cream. She continues PO estrace as well, no problems with hot flashes.     Additional OB/GYN History   Current contraception: contraceptive methods: AGUSTÍN-BSO   Desires to: do not start contraception  On HRT? Yes. Details: Estradiol tablet and cream  Last Pap : 2021. Results: negative  Last Completed Pap Smear          Ordered - PAP SMEAR (Every 3 Years) Ordered on 2021  SCANNED - PAP SMEAR    11/15/2019  SCANNED - PAP SMEAR    2017  Done              History of abnormal Pap smear: no  Family history of uterine, colon, breast, or ovarian cancer: no  Performs monthly Self-Breast Exam: no  Last mammogram: 2019. Done at - negative.    Last Completed Mammogram     This patient has no relevant Health Maintenance data.        Last colonoscopy: 2020- negative  Last Completed Colonoscopy          COLORECTAL CANCER SCREENING (COLONOSCOPY - Every 10 Years) Next due on 3/23/2030    2020  Surgical Procedure: COLONOSCOPY    2020  COLONOSCOPY              Last DEXA: None  Exercises Regularly: no  Feelings of Anxiety or Depression: no      Tobacco Usage?: No   OB History        2    Para   2    Term   2            AB        Living   2       SAB        IAB        Ectopic        Molar        Multiple        Live Births   2                Health Maintenance   Topic Date Due   • DXA SCAN  Never done   • COVID-19 Vaccine (1) Never done  "  • HEPATITIS C SCREENING  Never done   • LIPID PANEL  02/19/2019   • ZOSTER VACCINE (1 of 2) Never done   • ANNUAL PHYSICAL  02/26/2021   • MAMMOGRAM  12/13/2021   • Annual Gynecologic Pelvic and Breast Exam  05/26/2022   • INFLUENZA VACCINE  08/01/2022   • PAP SMEAR  05/25/2024   • TDAP/TD VACCINES (2 - Td or Tdap) 03/01/2030   • COLORECTAL CANCER SCREENING  03/23/2030   • Pneumococcal Vaccine 0-64  Aged Out       The additional following portions of the patient's history were reviewed and updated as appropriate: allergies, current medications, past family history, past medical history, past social history and past surgical history.    Review of Systems   Constitutional: Negative.    HENT: Negative.    Eyes: Negative.    Respiratory: Negative.    Cardiovascular: Negative.    Gastrointestinal: Negative.    Endocrine: Negative.    Genitourinary: Negative.    Musculoskeletal: Negative.    Skin: Negative.    Allergic/Immunologic: Negative.    Neurological: Negative.    Hematological: Negative.    Psychiatric/Behavioral: Negative.        I have reviewed and agree with the HPI, ROS, and historical information as entered above. Abigail Fong MD    Objective   /78   Ht 154.9 cm (61\")   Wt 76.7 kg (169 lb)   LMP  (LMP Unknown)   BMI 31.93 kg/m²     Physical Exam  Vitals and nursing note reviewed. Exam conducted with a chaperone present.   Constitutional:       Appearance: She is well-developed.   HENT:      Head: Normocephalic and atraumatic.   Eyes:      Pupils: Pupils are equal, round, and reactive to light.   Neck:      Thyroid: No thyroid mass or thyromegaly.   Pulmonary:      Effort: Pulmonary effort is normal. No retractions.   Chest:      Chest wall: No mass.   Breasts:      Right: Normal. No mass, nipple discharge, skin change or tenderness.      Left: Normal. No mass, nipple discharge, skin change or tenderness.       Abdominal:      General: Bowel sounds are normal.      Palpations: Abdomen is soft. " Abdomen is not rigid. There is no mass.      Tenderness: There is no abdominal tenderness. There is no guarding.      Hernia: No hernia is present. There is no hernia in the left inguinal area or right inguinal area.   Genitourinary:     Exam position: Lithotomy position.      Pubic Area: No rash.       Labia:         Right: No rash, tenderness or lesion.         Left: No rash, tenderness or lesion.       Urethra: No urethral pain or urethral swelling.      Vagina: Normal. No vaginal discharge or lesions.      Cervix: No cervical motion tenderness, discharge, lesion or cervical bleeding.      Uterus: Normal. Not enlarged, not fixed and not tender.       Adnexa:         Right: No mass, tenderness or fullness.          Left: No mass, tenderness or fullness.        Rectum: No external hemorrhoid.      Comments: +lichen sclerosis  Musculoskeletal:      Cervical back: Normal range of motion. No muscular tenderness.      Right lower leg: No edema.      Left lower leg: No edema.   Skin:     General: Skin is warm and dry.   Neurological:      Mental Status: She is alert and oriented to person, place, and time.      Motor: Motor function is intact.   Psychiatric:         Mood and Affect: Mood and affect normal.         Behavior: Behavior normal.            Assessment and Plan    Problem List Items Addressed This Visit        Gynecologic and Obstetric Problems    Vulvar cancer, carcinoma (HCC)    Relevant Orders    DEXA Bone Density Axial    Mammo Screening Digital Tomosynthesis Bilateral With CAD       Other    Lichen sclerosus et atrophicus    Relevant Orders    DEXA Bone Density Axial    Mammo Screening Digital Tomosynthesis Bilateral With CAD      Other Visit Diagnoses     Vulvar cancer (HCC)    -  Primary    Women's annual routine gynecological examination        Relevant Orders    DEXA Bone Density Axial    Mammo Screening Digital Tomosynthesis Bilateral With CAD    Hormone replacement therapy (HRT)        Relevant  Orders    DEXA Bone Density Axial    Mammo Screening Digital Tomosynthesis Bilateral With CAD          1. GYN annual well woman exam.   2. Reviewed monthly self breast exams.  Instructed to call with lumps, pain, or breast discharge.  Yearly mammograms ordered.  3. Ordered mammogram today.  4. Recommended use of Vitamin D and getting adequate calcium in her diet. (1500mg)  5. Osteoporosis screening ordered today.  6. Reviewed risks of ERT including increased risk of breast cancer, increased blood clots, increased heart disease.  Patient strongly desires to stay on or start ERT.  She understands we will use the lowest amount that adequately controls her symptoms.  7. RTC in 1 year or PRN with problems.  8. Return in about 1 year (around 5/26/2023) for Annual physical, Schedule DEXA with next annual, Schedule LYNDSEY.     Abigail Fong MD  05/26/2022

## 2022-06-22 ENCOUNTER — HOSPITAL ENCOUNTER (OUTPATIENT)
Dept: MAMMOGRAPHY | Facility: HOSPITAL | Age: 52
Discharge: HOME | End: 2022-06-22
Admitting: OBSTETRICS & GYNECOLOGY

## 2022-06-22 DIAGNOSIS — Z01.419 WOMEN'S ANNUAL ROUTINE GYNECOLOGICAL EXAMINATION: ICD-10-CM

## 2022-06-22 DIAGNOSIS — Z79.890 HORMONE REPLACEMENT THERAPY (HRT): ICD-10-CM

## 2022-06-22 DIAGNOSIS — C51.9 VULVAR CANCER, CARCINOMA: ICD-10-CM

## 2022-06-22 DIAGNOSIS — L90.0 LICHEN SCLEROSUS ET ATROPHICUS: ICD-10-CM

## 2022-06-22 PROCEDURE — 77067 SCR MAMMO BI INCL CAD: CPT

## 2022-06-22 PROCEDURE — 77063 BREAST TOMOSYNTHESIS BI: CPT

## 2022-06-22 PROCEDURE — 77063 BREAST TOMOSYNTHESIS BI: CPT | Performed by: RADIOLOGY

## 2022-06-22 PROCEDURE — 77067 SCR MAMMO BI INCL CAD: CPT | Performed by: RADIOLOGY

## 2022-07-12 RX ORDER — SCOLOPAMINE TRANSDERMAL SYSTEM 1 MG/1
1 PATCH, EXTENDED RELEASE TRANSDERMAL
Qty: 4 PATCH | Refills: 0 | Status: SHIPPED | OUTPATIENT
Start: 2022-07-12 | End: 2022-12-16

## 2022-07-15 DIAGNOSIS — E78.2 MIXED DYSLIPIDEMIA: ICD-10-CM

## 2022-07-18 RX ORDER — FENOFIBRATE 54 MG/1
TABLET ORAL
Qty: 90 TABLET | Refills: 0 | Status: SHIPPED | OUTPATIENT
Start: 2022-07-18 | End: 2023-03-20

## 2022-07-25 NOTE — TELEPHONE ENCOUNTER
EDGAR Last apt vulvar biopsy 12/2019 - sent to Dr. Ruffin Last pap 2017.   5-Fu Counseling: 5-Fluorouracil Counseling:  I discussed with the patient the risks of 5-fluorouracil including but not limited to erythema, scaling, itching, weeping, crusting, and pain.

## 2022-08-04 ENCOUNTER — CLINICAL SUPPORT (OUTPATIENT)
Dept: FAMILY MEDICINE CLINIC | Facility: CLINIC | Age: 52
End: 2022-08-04

## 2022-08-04 DIAGNOSIS — Z20.822 ENCOUNTER FOR LABORATORY TESTING FOR COVID-19 VIRUS: ICD-10-CM

## 2022-08-04 DIAGNOSIS — R05.9 COUGH: Primary | ICD-10-CM

## 2022-08-04 LAB
EXPIRATION DATE: NORMAL
FLUAV AG UPPER RESP QL IA.RAPID: NOT DETECTED
FLUBV AG UPPER RESP QL IA.RAPID: NOT DETECTED
INTERNAL CONTROL: NORMAL
Lab: NORMAL
SARS-COV-2 AG UPPER RESP QL IA.RAPID: NOT DETECTED

## 2022-08-04 PROCEDURE — 87428 SARSCOV & INF VIR A&B AG IA: CPT | Performed by: FAMILY MEDICINE

## 2022-08-08 ENCOUNTER — TELEPHONE (OUTPATIENT)
Dept: FAMILY MEDICINE CLINIC | Facility: CLINIC | Age: 52
End: 2022-08-08

## 2022-08-08 DIAGNOSIS — I87.2 VENOUS INSUFFICIENCY OF BOTH LOWER EXTREMITIES: ICD-10-CM

## 2022-08-08 DIAGNOSIS — I10 HYPERTENSION, ESSENTIAL, BENIGN: ICD-10-CM

## 2022-08-08 RX ORDER — LISINOPRIL 10 MG/1
TABLET ORAL
Qty: 90 TABLET | Refills: 0 | Status: SHIPPED | OUTPATIENT
Start: 2022-08-08 | End: 2022-10-06 | Stop reason: SINTOL

## 2022-08-08 RX ORDER — TORSEMIDE 10 MG/1
TABLET ORAL
Qty: 90 TABLET | Refills: 0 | Status: SHIPPED | OUTPATIENT
Start: 2022-08-08 | End: 2023-01-11

## 2022-08-08 NOTE — TELEPHONE ENCOUNTER
Caller: SYLVIE OLVERA    Relationship to patient: Emergency Contact    Best call back number: [phone]    Date of exposure: UNKNOWN    Date of positive COVID19 test: 8/7/2022 AT HOME    Date if possible COVID19 exposure: UNKNOWN    COVID19 symptoms: FEVER, HEADACHES, BODY ACHES, CONGESTION, COUGH, LACK OF TASTE    Date of initial quarantine: 8/1/2022    Additional information or concerns: PATIENT'S  CALLED TO SEE WHAT WE COULD PRESCRIBE TO HELP WITH THESE SYMPTOMS. PLEASE CALL TO DISCUSS THIS WITH PATIENT AS SOON AS POSSIBLE.     What is the patients preferred pharmacy: [facility] [address] [place] [address] [address] [place] [place] [address] [phone] [facility] [phone] FX

## 2022-08-09 RX ORDER — DEXAMETHASONE 0.5 MG/1
TABLET ORAL
Qty: 21 TABLET | Refills: 0 | Status: SHIPPED | OUTPATIENT
Start: 2022-08-09 | End: 2022-09-13

## 2022-08-09 RX ORDER — AZITHROMYCIN 250 MG/1
TABLET, FILM COATED ORAL
Qty: 6 TABLET | Refills: 0 | Status: SHIPPED | OUTPATIENT
Start: 2022-08-09 | End: 2022-09-13

## 2022-08-24 ENCOUNTER — OFFICE VISIT (OUTPATIENT)
Dept: OBSTETRICS AND GYNECOLOGY | Facility: CLINIC | Age: 52
End: 2022-08-24

## 2022-08-24 VITALS
DIASTOLIC BLOOD PRESSURE: 80 MMHG | SYSTOLIC BLOOD PRESSURE: 128 MMHG | WEIGHT: 169.2 LBS | BODY MASS INDEX: 31.95 KG/M2 | HEIGHT: 61 IN

## 2022-08-24 DIAGNOSIS — N63.21 MASS OF UPPER OUTER QUADRANT OF LEFT BREAST: Primary | ICD-10-CM

## 2022-08-24 PROBLEM — O24.414 INSULIN CONTROLLED GESTATIONAL DIABETES MELLITUS (GDM) DURING PREGNANCY, ANTEPARTUM: Status: ACTIVE | Noted: 2022-08-24

## 2022-08-24 PROBLEM — O24.414 INSULIN CONTROLLED GESTATIONAL DIABETES MELLITUS (GDM) DURING PREGNANCY, ANTEPARTUM: Status: RESOLVED | Noted: 2022-08-24 | Resolved: 2022-08-24

## 2022-08-24 PROCEDURE — 99212 OFFICE O/P EST SF 10 MIN: CPT | Performed by: NURSE PRACTITIONER

## 2022-08-24 NOTE — PROGRESS NOTES
OBGYN Office Note        Subjective     HPI  Edyta Franco is a 52 y.o. female, , who presents with breast complaints of a lump.  This is present in upper outer quadrant of left breast(s).    She states she has experienced this problem for 2 months, right after having her mammogram on 22. The problem has resolved since it began. The patient denies breast tenderness, changed mole, dryness, nipple discharge, pruritus, rash, skin color change, skin lesion(s). She has had a mammogram before. She had a mammogram on 22, she reports waking up with bruising to her right upper outer breast a few days prior to going in for her mammogram. She doesn't remember doing anything to cause this bruising and shortly after the mammogram she developed a lump in the area. They marked the bruising for her mammogram and it has since resolved.  She does not have a family history of breast cancer. Other concerns include: none    Her last LMP was No LMP recorded (lmp unknown). Patient has had a hysterectomy.    Current contraception: contraceptive methods: patient has had a hysterectomy      Last mammogram: 22- benign   Last Completed Mammogram          MAMMOGRAM (Every 2 Years) Next due on 2022  Mammo Screening Digital Tomosynthesis Bilateral With CAD    2019  Mammo Screening Digital Tomosynthesis Bilateral With CAD              Tobacco Usage?: No     Past Medical History:   Diagnosis Date   • Epilepsy (HCC) 2020    Patient reported she was taken off her medication for this apx 10 years ago.  Reported no seizure activity for apx. 8 years ago.     • Fatigue    • Hypercholesteremia    • Hypertension    • Lichen sclerosus of female genitalia    • Rectal bleeding 2020   • Vulva cancer (HCC)    • Wears contact lenses 2020    Patient advised contacts will need to be removed DOS       Past Surgical History:   Procedure Laterality Date   •  SECTION     • COLONOSCOPY  N/A 03/23/2020    Procedure: COLONOSCOPY WITH BIOPSY;  Surgeon: Eleni Russell MD;  Location: [facility] ENDOSCOPY;  Service: Gastroenterology;  Laterality: N/A;   • HAND SURGERY Right 2014   • LUMBAR LAMINECTOMY  12/13/2011    HEMILAMI RT-C7-T1, RT C7-T1 MEDIAL FACET & CT-T1 DISC (Dr. Cabrera   • ORIF ANKLE FRACTURE Right    • TOTAL ABDOMINAL HYSTERECTOMY WITH SALPINGO OOPHORECTOMY  2001    Dr. Richard Armendariz - [facility]    • VULVECTOMY N/A 08/30/2017    Procedure: MODIFIED RADICAL VULVECTOMY, BILATERAL SENTINAL LYMPH NODE DISSECTION, MAPPING BIOPSIES OF VULVA;  Surgeon: Isabela Nassar MD;  Location: [facility] OR;  Service:        Family History   Problem Relation Age of Onset   • Diabetes type II Father    • Congenital heart disease Father    • Hypertension Father    • Hyperlipidemia Father    • Diabetes type II Mother    • Congenital heart disease Mother    • Hypertension Mother    • Stroke Mother    • Hyperlipidemia Mother    • Heart attack Other    • Hyperlipidemia Other    • Breast cancer Neg Hx    • Colon cancer Neg Hx    • Uterine cancer Neg Hx    • Ovarian cancer Neg Hx           Current Outpatient Medications:   •  azithromycin (Zithromax Z-Saravanan) 250 MG tablet, Take 2 tablets by mouth on day 1, then 1 tablet daily on days 2-5, Disp: 6 tablet, Rfl: 0  •  clobetasol (TEMOVATE) 0.05 % ointment, Apply 1 application topically to the appropriate area as directed 2 (Two) Times a Day., Disp: 45 g, Rfl: 3  •  desmopressin (DDAVP) 0.2 MG tablet, , Disp: , Rfl:   •  dexamethasone (DECADRON) 0.5 MG tablet, 6 po x1d; then 5 po x 1d; then 4 po x 1d; then 3 po x 1d; then 2 po x 1d; then 1 po x1d; then STOP, Disp: 21 tablet, Rfl: 0  •  Diclofenac Sodium (VOLTAREN) 1 % gel gel, Apply 4 g topically to the appropriate area as directed 4 (Four) Times a Day As Needed (musculoskeletal pain)., Disp: 100 g, Rfl: 0  •  estradiol (ESTRACE) 0.1 MG/GM vaginal cream, Place small amount cream on vulvar lesion 2-3 times per week,  "Disp: 43 g, Rfl: 5  •  estradiol (ESTRACE) 0.5 MG tablet, Take 1 tablet by mouth Daily., Disp: 90 tablet, Rfl: 3  •  fenofibrate (TRICOR) 54 MG tablet, Take 1 tablet by mouth once daily, Disp: 90 tablet, Rfl: 0  •  HYDROcod Polst-CPM Polst ER (Tussionex Pennkinetic ER) 10-8 MG/5ML ER suspension, Take 5 mL by mouth Every 12 (Twelve) Hours As Needed for Cough., Disp: 100 mL, Rfl: 0  •  lisinopril (PRINIVIL,ZESTRIL) 10 MG tablet, Take 1 tablet by mouth once daily, Disp: 90 tablet, Rfl: 0  •  rosuvastatin (CRESTOR) 40 MG tablet, TAKE 1 TABLET BY MOUTH AT NIGHT, Disp: 90 tablet, Rfl: 0  •  Scopolamine (Transderm-Scop, 1.5 MG,) 1 MG/3DAYS patch, Place 1 patch on the skin as directed by provider Every 72 (Seventy-Two) Hours., Disp: 4 patch, Rfl: 0  •  torsemide (DEMADEX) 10 MG tablet, Take 1 tablet by mouth once daily, Disp: 90 tablet, Rfl: 0     Review of Systems   Constitutional: Negative.    Respiratory: Negative.    Cardiovascular: Negative.    Gastrointestinal: Negative.    Genitourinary: Positive for breast lump.       I have reviewed and agree with the HPI, ROS, and historical information as entered above. Marleny Rico, APRN    Objective   /80   Ht 154.9 cm (61\")   Wt 76.7 kg (169 lb 3.2 oz)   LMP  (LMP Unknown)   BMI 31.97 kg/m²     Physical Exam  Constitutional:       Appearance: Normal appearance.   Pulmonary:      Effort: Pulmonary effort is normal.   Chest:   Breasts:      Right: Normal. No swelling, inverted nipple, mass, nipple discharge, skin change or tenderness.      Left: Normal. No swelling, inverted nipple, mass, nipple discharge, skin change or tenderness.       Neurological:      Mental Status: She is alert.           Assessment & Plan     Assessment     Problem List Items Addressed This Visit    None     Visit Diagnoses     Mass of upper outer quadrant of left breast    -  Primary        She had a bruise followed by a breast lump with no known trauma. No other bruising without reason. " Now resolved. Mammogram negative 6/2022    Plan    Continue monthly breast exams and yearly mammogram  Return if symptoms worsen or fail to improve, for Annual physical.      Marleny Rico, APRN  08/24/2022

## 2022-09-13 ENCOUNTER — OFFICE VISIT (OUTPATIENT)
Dept: FAMILY MEDICINE CLINIC | Facility: CLINIC | Age: 52
End: 2022-09-13

## 2022-09-13 VITALS
DIASTOLIC BLOOD PRESSURE: 72 MMHG | SYSTOLIC BLOOD PRESSURE: 132 MMHG | HEIGHT: 61 IN | BODY MASS INDEX: 27.75 KG/M2 | TEMPERATURE: 97.8 F | WEIGHT: 147 LBS | HEART RATE: 79 BPM | RESPIRATION RATE: 15 BRPM | OXYGEN SATURATION: 98 %

## 2022-09-13 DIAGNOSIS — I10 HYPERTENSION, ESSENTIAL, BENIGN: Primary | ICD-10-CM

## 2022-09-13 DIAGNOSIS — I87.2 VENOUS INSUFFICIENCY OF BOTH LOWER EXTREMITIES: ICD-10-CM

## 2022-09-13 DIAGNOSIS — R73.01 IMPAIRED FASTING GLUCOSE: ICD-10-CM

## 2022-09-13 PROBLEM — A46 ERYSIPELAS: Status: RESOLVED | Noted: 2017-09-24 | Resolved: 2022-09-13

## 2022-09-13 LAB
EXPIRATION DATE: NORMAL
HBA1C MFR BLD: 5.7 %
Lab: NORMAL

## 2022-09-13 PROCEDURE — 83036 HEMOGLOBIN GLYCOSYLATED A1C: CPT | Performed by: FAMILY MEDICINE

## 2022-09-13 PROCEDURE — 99213 OFFICE O/P EST LOW 20 MIN: CPT | Performed by: FAMILY MEDICINE

## 2022-09-13 RX ORDER — NEBIVOLOL 5 MG/1
5 TABLET ORAL DAILY
Qty: 90 TABLET | Refills: 1 | Status: SHIPPED | OUTPATIENT
Start: 2022-09-13 | End: 2022-12-30

## 2022-10-05 ENCOUNTER — TELEPHONE (OUTPATIENT)
Dept: FAMILY MEDICINE CLINIC | Facility: CLINIC | Age: 52
End: 2022-10-05

## 2022-10-05 NOTE — TELEPHONE ENCOUNTER
Dr. Albrecht still covers his messages on Wednesday. My recommendation would be to stop the medication. She can be seen in clinic tomorrow or Friday. I do not know why it was started initially so hard for me to tell

## 2022-10-05 NOTE — TELEPHONE ENCOUNTER
It looks like the patient was recently started on Bystolic on 9/13/2022.    Please advise.    Divina,    I am attaching you to this message since Dr. Albrecht is round at the nursing home today.    Thank you.

## 2022-10-05 NOTE — TELEPHONE ENCOUNTER
Caller: Edyta Franco    Relationship: Self    Best call back number: [phone]     What medications are you currently taking:   Current Outpatient Medications on File Prior to Visit   Medication Sig Dispense Refill   • clobetasol (TEMOVATE) 0.05 % ointment Apply 1 application topically to the appropriate area as directed 2 (Two) Times a Day. 45 g 3   • desmopressin (DDAVP) 0.2 MG tablet      • Diclofenac Sodium (VOLTAREN) 1 % gel gel Apply 4 g topically to the appropriate area as directed 4 (Four) Times a Day As Needed (musculoskeletal pain). 100 g 0   • estradiol (ESTRACE) 0.1 MG/GM vaginal cream Place small amount cream on vulvar lesion 2-3 times per week 43 g 5   • estradiol (ESTRACE) 0.5 MG tablet Take 1 tablet by mouth Daily. 90 tablet 3   • fenofibrate (TRICOR) 54 MG tablet Take 1 tablet by mouth once daily 90 tablet 0   • HYDROcod Polst-CPM Polst ER (Tussionex Pennkinetic ER) 10-8 MG/5ML ER suspension Take 5 mL by mouth Every 12 (Twelve) Hours As Needed for Cough. 100 mL 0   • lisinopril (PRINIVIL,ZESTRIL) 10 MG tablet Take 1 tablet by mouth once daily 90 tablet 0   • nebivolol (Bystolic) 5 MG tablet Take 1 tablet by mouth Daily. 90 tablet 1   • rosuvastatin (CRESTOR) 40 MG tablet TAKE 1 TABLET BY MOUTH AT NIGHT 90 tablet 0   • Scopolamine (Transderm-Scop, 1.5 MG,) 1 MG/3DAYS patch Place 1 patch on the skin as directed by provider Every 72 (Seventy-Two) Hours. 4 patch 0   • torsemide (DEMADEX) 10 MG tablet Take 1 tablet by mouth once daily 90 tablet 0     No current facility-administered medications on file prior to visit.          When did you start taking these medications: ABOUT 2 WEEK AGO    Which medication are you concerned about: THE MOST RECENT BLOOD PRESSURE MEDICATION    Who prescribed you this medication: DR SHEPARD    What are your concerns: LOW HEART RATE, DIZZINESS, FEELS LIKE SHE COULD PASS OUT    How long have you had these concerns: IN THE LAST 3 TO 4 DAYS

## 2022-10-14 ENCOUNTER — TELEPHONE (OUTPATIENT)
Dept: GYNECOLOGIC ONCOLOGY | Facility: CLINIC | Age: 52
End: 2022-10-14

## 2022-10-14 RX ORDER — ANORECTAL OINTMENT 15.7; .44; 24; 20.6 G/100G; G/100G; G/100G; G/100G
1 OINTMENT TOPICAL 2 TIMES DAILY
Qty: 71 G | Refills: 0 | Status: SHIPPED | OUTPATIENT
Start: 2022-10-14 | End: 2022-10-18

## 2022-10-14 NOTE — TELEPHONE ENCOUNTER
Patient called and left a message on the nurse triage line.     She states her Lichens Sclerosus has been active and she has been using her clobetasol cream. Which has thinned her skin and caused significant discomfort and cracking. She has two new white places and would like to be seen.  She is also having difficulty with using the restroom because of the discomfort.       Will speak with MD to see if we can get her on       MD wants patient seen first available. She wants her to use Calmoseptine on her vulva and discontinue the clobetasol over the weekend.  I left this in a detailed message on patient self identifying voicemail. Also left her appointment date and time. She is to call if she needs further clarification.

## 2022-10-17 NOTE — PROGRESS NOTES
Edyta Franco  [id number]  1970      Reason for visit:  History of vulvar cancer, concern for new lesions    History of present illness:  The patient is a 52 y.o. year old female who presents today for treatment and evaluation of the above issues.    History of cancer as below and lichen sclerosis.  Was due for follow up 12/21 and missed follow up.    Today, notes bleeding from vulvar lesions.  She continues to use clobetasol.  She also uses Estrace orally and topically at the vulva.  She missed a follow-up appointment because she had COVID and did not end up rescheduling.  She has concerned about lesions although her symptoms are overall improved.  She is bradycardic today but notes that her physicians have been trying to regulate her blood pressure and pulse.  She has no other concerns.    Oncologic History:  Oncology/Hematology History   Vulvar cancer, carcinoma (HCC)   8/14/2017 Biopsy    History of lichens sclerosus, on clobetasol therapy. Pt presented to Dr. Abigail Fong with c/o painful ulcerative left vulvar lesion. Biopsy revealed invasive SCC, extending into deep margin at least 3.5 mm. Referred to Gyn Oncology     8/30/2017 Surgery    Modified radical vulvectomy, left inguinofemoral sentinel lymph node dissection, right inguinofemoral lymph node dissection, and mapping biopsies of the vulva. Surgery by Dr. Nassar and Dr. Bree Russell.     Pathology revealed SCC, focally keratinizing with 0.3 cm invasion. LVSI negative, lymph nodes negative, mapping biopsies negative. Stage IB grade 2       9/24/2017 - 9/27/2017 Other Event    Inpatient hospital admission for inguinal incision cellulitis, inguinal fluid collection, and SIRS. Treated with IV antibiotics and sent home on oral antibiotics.      10/26/2017 - 10/27/2017 Other Event    2 day inpatient admission for recurrent cellulitis, resolved with antibiotic management           Past Medical History:   Diagnosis Date   • Epilepsy (HCC) 03/20/2020     Patient reported she was taken off her medication for this apx 10 years ago.  Reported no seizure activity for apx. 8 years ago.     • Fatigue    • Hypercholesteremia    • Hypertension    • Lichen sclerosus of female genitalia    • Rectal bleeding 2020   • Vulva cancer (HCC)    • Wears contact lenses 2020    Patient advised contacts will need to be removed DOS       Past Surgical History:   Procedure Laterality Date   •  SECTION     • COLONOSCOPY N/A 2020    Procedure: COLONOSCOPY WITH BIOPSY;  Surgeon: Eleni Russell MD;  Location: [facility] ENDOSCOPY;  Service: Gastroenterology;  Laterality: N/A;   • HAND SURGERY Right 2014   • LUMBAR LAMINECTOMY  2011    HEMILAMI RT-C7-T1, RT C7-T1 MEDIAL FACET & CT-T1 DISC (Dr. Cabrera   • ORIF ANKLE FRACTURE Right    • TOTAL ABDOMINAL HYSTERECTOMY WITH SALPINGO OOPHORECTOMY      Dr. Richard Armendariz - [place] , [place]    • VULVECTOMY N/A 2017    Procedure: MODIFIED RADICAL VULVECTOMY, BILATERAL SENTINAL LYMPH NODE DISSECTION, MAPPING BIOPSIES OF VULVA;  Surgeon: Isabela Nassar MD;  Location: [facility] OR;  Service:        MEDICATIONS: The current medication list was reviewed with the patient and updated in the EMR this date per the Medical Assistant. Medication dosages and frequencies were confirmed to be accurate.      Allergies:  is allergic to penicillins.    Social History:   Social History     Socioeconomic History   • Marital status:    • Number of children: 2   Tobacco Use   • Smoking status: Never   • Smokeless tobacco: Never   Vaping Use   • Vaping Use: Never used   Substance and Sexual Activity   • Alcohol use: Yes     Comment: 2x month   • Drug use: Never   • Sexual activity: Yes     Partners: Male     Birth control/protection: Surgical       Family History:    Family History   Problem Relation Age of Onset   • Diabetes type II Father    • Congenital heart disease Father    • Hypertension Father    • Hyperlipidemia  Father    • Diabetes type II Mother    • Congenital heart disease Mother    • Hypertension Mother    • Stroke Mother    • Hyperlipidemia Mother    • Heart attack Other    • Hyperlipidemia Other    • Breast cancer Neg Hx    • Colon cancer Neg Hx    • Uterine cancer Neg Hx    • Ovarian cancer Neg Hx        Health Maintenance:    Health Maintenance   Topic Date Due   • DXA SCAN  Never done   • COVID-19 Vaccine (1) Never done   • HEPATITIS C SCREENING  Never done   • LIPID PANEL  02/19/2019   • ZOSTER VACCINE (1 of 2) Never done   • ANNUAL PHYSICAL  02/26/2021   • INFLUENZA VACCINE  08/01/2022   • PAP SMEAR  05/25/2024   • MAMMOGRAM  06/22/2024   • TDAP/TD VACCINES (2 - Td or Tdap) 03/01/2030   • COLORECTAL CANCER SCREENING  03/23/2030   • Pneumococcal Vaccine 0-64  Aged Out     Review of Systems  Please refer to HPI, remainder of ROS negative.    Physical Exam    Vitals:    10/18/22 1331   BP: 150/85   Pulse: (!) 47   Resp: 16   Temp: 97.5 °F (36.4 °C)   SpO2: 98%   Weight: 77 kg (169 lb 11.2 oz)   PainSc: 0-No pain       Body mass index is 32.06 kg/m².  Wt Readings from Last 3 Encounters:   10/18/22 77 kg (169 lb 11.2 oz)   09/13/22 66.7 kg (147 lb)   08/24/22 76.7 kg (169 lb 3.2 oz)     GENERAL: Alert, well-appearing female appearing her stated age who is in no apparent distress.  Patient is obese by BMI criteria.  HEENT: Sclera anicteric. Head normocephalic, atraumatic. Mucus membranes moist.   NECK: Trachea midline, supple, without masses.  No thyromegaly.   BREASTS: Deferred  CARDIOVASCULAR: bradycardia, regular rhythm, no murmurs, rubs, or gallops.  No peripheral edema.  RESPIRATORY: Clear to auscultation bilaterally, normal respiratory effort  BACK:  No CVA tenderness, no vertebral tenderness on palpation  GASTROINTESTINAL:  Abdomen is soft, non-tender, non-distended, no rebound or guarding, no masses, or hernias. No HSM.   SKIN:  Warm, dry, well-perfused.  All visible areas intact.  No rashes, lesions,  ulcers.  PSYCHIATRIC: AO x3, with appropriate affect, normal thought processes.  NEUROLOGIC: No focal deficits.  Moves extremities well.  MUSCULOSKELETAL: Normal gait and station.   EXTREMITIES:   No cyanosis, clubbing, symmetric.  LYMPHATICS:  No cervical or inguinal adenopathy noted.     PELVIC exam:    Physical Exam  Genitourinary:      Genitourinary Comments: Diffuse lichen sclerosis change with more thickened epithelium at the anterior left labia minora and punctate lesions of uncertain etiology at the right mid/posterior vaginal introitus.  These are areas of biopsy today.                 ECOG PS 0    PROCEDURES:  fter obtaining informed consent, vulvar biopsy was performed and the 2 areas noted above.  Area was prepped with Betadine.  One percent lidocaine was injected for a total of 1 mL.  3 mm punch biopsy was performed.  Specimen was sent for permanent pathology.  Hemostasis was achieved with silver nitrate.  Procedure was well tolerated.  There are no immediate complications.  There was minimal blood loss.    Diagnostic Data:      Lab Results   Component Value Date    WBC 8.56 10/18/2021    HGB 14.3 10/18/2021    HCT 42.6 10/18/2021    MCV 91.8 10/18/2021     10/18/2021    NEUTROABS 5.33 10/18/2021    GLUCOSE 91 10/18/2021    BUN 11 10/18/2021    CREATININE 0.79 10/18/2021    EGFRIFNONA 77 10/18/2021    EGFRIFAFRI 84 10/28/2020     10/18/2021    K 4.0 10/18/2021     10/18/2021    CO2 26.8 10/18/2021    MG 2.0 11/11/2016    CALCIUM 8.8 10/18/2021    ALBUMIN 4.70 10/18/2021    AST 25 10/18/2021    ALT 25 10/18/2021    BILITOT 0.3 10/18/2021     No results found for:       Assessment & Plan   This is a 52 y.o. woman with history of vulvar cancer, diffuse lichen sclerosis.  Exam remarkable for the above lesions.  Patient called to be evaluated due to concerns.  Encounter Diagnoses   Name Primary?   • Vulvar cancer, carcinoma (HCC) Yes   • Lichen sclerosus et atrophicus      Await  biopsy results.  Resume steroids, topical estrogen after appropriate healing from biopsy.    Pain assessment was performed today as a part of patient’s care.  For patients with pain related to surgery, gynecologic malignancy or cancer treatment, the plan is as noted in the assessment/plan.  For patients with pain not related to these issues, they are to seek any further needed care from a more appropriate provider, such as PCP.      No orders of the defined types were placed in this encounter.    FOLLOW UP: 6 months    I spent 25 minutes caring for Edyta on this date of service. This time includes time spent by me in the following activities: preparing for the visit, reviewing tests, performing a medically appropriate examination and/or evaluation, counseling and educating the patient/family/caregiver, ordering medications, tests, or procedures, referring and communicating with other health care professionals and documenting information in the medical record  I spent 5 minutes on the separately reported service of vulvar biopsy x2. This time is not included in the time used to support the E/M service also reported today.    Electronically Signed by: Isabela Nassar MD  Date: 10/18/2022

## 2022-10-18 ENCOUNTER — OFFICE VISIT (OUTPATIENT)
Dept: GYNECOLOGIC ONCOLOGY | Facility: CLINIC | Age: 52
End: 2022-10-18

## 2022-10-18 ENCOUNTER — TELEPHONE (OUTPATIENT)
Dept: GYNECOLOGIC ONCOLOGY | Facility: CLINIC | Age: 52
End: 2022-10-18

## 2022-10-18 VITALS
DIASTOLIC BLOOD PRESSURE: 85 MMHG | TEMPERATURE: 97.5 F | HEART RATE: 47 BPM | OXYGEN SATURATION: 98 % | RESPIRATION RATE: 16 BRPM | WEIGHT: 169.7 LBS | BODY MASS INDEX: 32.06 KG/M2 | SYSTOLIC BLOOD PRESSURE: 150 MMHG

## 2022-10-18 DIAGNOSIS — R10.2 VULVAR PAIN: ICD-10-CM

## 2022-10-18 DIAGNOSIS — C51.9 VULVAR CANCER, CARCINOMA: Primary | ICD-10-CM

## 2022-10-18 DIAGNOSIS — L90.0 LICHEN SCLEROSUS ET ATROPHICUS: ICD-10-CM

## 2022-10-18 DIAGNOSIS — N90.89 VULVAR LESION: ICD-10-CM

## 2022-10-18 PROCEDURE — 99213 OFFICE O/P EST LOW 20 MIN: CPT | Performed by: OBSTETRICS & GYNECOLOGY

## 2022-10-18 PROCEDURE — 56605 BIOPSY OF VULVA/PERINEUM: CPT | Performed by: OBSTETRICS & GYNECOLOGY

## 2022-10-18 PROCEDURE — 88305 TISSUE EXAM BY PATHOLOGIST: CPT | Performed by: OBSTETRICS & GYNECOLOGY

## 2022-10-18 PROCEDURE — 56606 BIOPSY OF VULVA/PERINEUM: CPT | Performed by: OBSTETRICS & GYNECOLOGY

## 2022-10-18 RX ORDER — TRAMADOL HYDROCHLORIDE 50 MG/1
50 TABLET ORAL EVERY 6 HOURS PRN
Qty: 10 TABLET | Refills: 0 | Status: SHIPPED | OUTPATIENT
Start: 2022-10-18 | End: 2022-12-16

## 2022-10-18 RX ORDER — LIDOCAINE 50 MG/G
1 OINTMENT TOPICAL 3 TIMES DAILY
Qty: 50 G | Refills: 0 | Status: SHIPPED | OUTPATIENT
Start: 2022-10-18 | End: 2023-03-29

## 2022-10-18 NOTE — TELEPHONE ENCOUNTER
Patient's  called and states patient is in severe pain driving home.   I asked APRN in office today what to do. She can use ice as soon as she gets home. Lidocaine ointment and ultram was called by APRN. If these things do not work they were encouraged to call the on call physician tonight.  worried if bleeding does not stop like last time that they will wind up in the ED and get cauterized again and states that was a very painful experience for the patient. I told him if bleeding was that severe to call the office first for what to do or if it is so severe for ED they need to consult Dr. Nassar before cautery is done. He verbalized understanding.

## 2022-10-21 ENCOUNTER — TELEPHONE (OUTPATIENT)
Dept: GYNECOLOGIC ONCOLOGY | Facility: CLINIC | Age: 52
End: 2022-10-21

## 2022-10-21 NOTE — TELEPHONE ENCOUNTER
Caller: SYLVIE OLVERA    Relationship: Emergency Contact    Best call back number: [phone]    What is the best time to reach you: ANYTIME    Who are you requesting to speak with (clinical staff, provider,  specific staff member): DR ALAMO OR NURSE    What was the call regarding: PLEASE CALL SYLVIE WITH PTS BIOPSY RESULTS.    Do you require a callback: YES

## 2022-10-21 NOTE — TELEPHONE ENCOUNTER
Called and spoke with . Notified of results. Lichen sclerosus with chronic inflammatory changes. No dysplasia or cancer in the biopsy. I told him I would need to speak with Dr. Nassar about plan of care.     Spoke with Dr. Nassar. Patient is to stay off clobetasol until biopsy site heal. She may use the calmoseptine until then. Once the biopsy sites heal we can discuss her coming for intradermal steroid injections instead of the clobetasol since the skin became thin and cracked.

## 2022-10-25 DIAGNOSIS — E78.01 FAMILIAL HYPERCHOLESTEROLEMIA: ICD-10-CM

## 2022-10-25 RX ORDER — ROSUVASTATIN CALCIUM 40 MG/1
TABLET, COATED ORAL
Qty: 90 TABLET | Refills: 0 | Status: SHIPPED | OUTPATIENT
Start: 2022-10-25 | End: 2023-03-20

## 2022-11-01 ENCOUNTER — DOCUMENTATION (OUTPATIENT)
Dept: GYNECOLOGIC ONCOLOGY | Facility: CLINIC | Age: 52
End: 2022-11-01

## 2022-11-01 NOTE — PROGRESS NOTES
I reviewed the literature on intradermal injection of steroids for lichen sclerosis.    Per uptodate:    Intralesional corticosteroids -- Thickened hypertrophic plaques may respond poorly to topical corticosteroids. In these cases, we suggest injection of triamcinolone hexacetonide or triamcinolone acetonide directly into the area of involvement once per month for three months [89]. Pretreatment with a small amount of a topical anesthetic (eg, eutectic mixture of lidocaine and prilocaine) and use of a small gauge needle help to minimize patient discomfort.  For small lesions (no more than 2 x 2 cm), we add 2 mL saline to 1 mL of triamcinolone (10 mg/mL) to make a solution of 3.3 mg/mL. We then inject 0.5 to 1 mL intralesionally with a 25 to 30 gauge needle to treat a lesion of 1 to 2 cm. For lesions covering a larger area, we perform several injections using the same concentration. We do not inject more than 3 mL per treatment session.     I would like to start with 2 mL as the area is >[id number].    She should continue topical clobetasol ointment also (max dose of twice a day for 12 weeks then taper).    Risk of hypopigmentation and dermal atrophy with any steroid use.    I called pt to discuss.  Left voice message that we would be pursuing an injection to help with her symptoms.  Advised that office would be reaching out with appointment dates and that she should call if she has any questions to address prior to procedure.    Electronically signed by Isabela Nassar MD, 11/01/22, 3:18 PM EDT.

## 2022-11-09 ENCOUNTER — PROCEDURE VISIT (OUTPATIENT)
Dept: GYNECOLOGIC ONCOLOGY | Facility: CLINIC | Age: 52
End: 2022-11-09

## 2022-11-09 VITALS
HEIGHT: 61 IN | BODY MASS INDEX: 31.91 KG/M2 | DIASTOLIC BLOOD PRESSURE: 92 MMHG | HEART RATE: 59 BPM | WEIGHT: 169 LBS | SYSTOLIC BLOOD PRESSURE: 147 MMHG | TEMPERATURE: 97.1 F | RESPIRATION RATE: 15 BRPM

## 2022-11-09 DIAGNOSIS — L29.2 VULVAR PRURITUS: ICD-10-CM

## 2022-11-09 DIAGNOSIS — L90.0 LICHEN SCLEROSUS ET ATROPHICUS: Primary | ICD-10-CM

## 2022-11-09 PROCEDURE — 96372 THER/PROPH/DIAG INJ SC/IM: CPT | Performed by: OBSTETRICS & GYNECOLOGY

## 2022-11-09 NOTE — PROGRESS NOTES
Procedure Visit    Patient: Edyta Franco   : 1970  Date of Procedure: 22 11:49 EST    Procedure: Intradermal vulvar corticosteroid injection  Indications: This is a 52-year-old with lichen sclerosus, which has been refractory to topical steroid management.  Plan was made for intradermal steroid injection.  Findings: Lichen sclerosis throughout the labia minora and inner portion of the labia majora.  Thickened area of the left anterior base of labia minora.  Erythema at right posterior introitus.  Description of Procedure: After informed consent was obtained, Emla cream was applied to the vulva.  After adequate timeframe, cream was removed and area was prepped with Betadine.  A solution of 3.3 mg/mL triamcinolone was mixed.  A total of 2 mL was injected intradermally at the right posterior introitus were patient had the most complaints of discomfort without issue.  Patient tolerated procedure appropriately but was very uncomfortable.  No complications.    She will continue clobetasol topically and return in 1 month for repeat injection.    Follow up: 1 month    I participated in all key aspects of this patient's surgical care.  The resident acted under my direct supervision for the entirety of the procedure.    Isabela Nassar MD  22

## 2022-11-27 ENCOUNTER — TELEMEDICINE (OUTPATIENT)
Dept: FAMILY MEDICINE CLINIC | Facility: TELEHEALTH | Age: 52
End: 2022-11-27

## 2022-11-27 DIAGNOSIS — J22 LOWER RESPIRATORY INFECTION (E.G., BRONCHITIS, PNEUMONIA, PNEUMONITIS, PULMONITIS): ICD-10-CM

## 2022-11-27 DIAGNOSIS — J11.1 INFLUENZA: Primary | ICD-10-CM

## 2022-11-27 PROCEDURE — 99213 OFFICE O/P EST LOW 20 MIN: CPT | Performed by: NURSE PRACTITIONER

## 2022-11-27 RX ORDER — BENZONATATE 200 MG/1
200 CAPSULE ORAL 3 TIMES DAILY PRN
Qty: 28 CAPSULE | Refills: 0 | OUTPATIENT
Start: 2022-11-27 | End: 2023-01-07

## 2022-11-27 RX ORDER — AZITHROMYCIN 250 MG/1
TABLET, FILM COATED ORAL
Qty: 6 TABLET | Refills: 0 | Status: SHIPPED | OUTPATIENT
Start: 2022-11-27 | End: 2022-12-16

## 2022-11-27 RX ORDER — PREDNISONE 10 MG/1
TABLET ORAL DAILY
Qty: 21 EACH | Refills: 0 | Status: SHIPPED | OUTPATIENT
Start: 2022-11-27 | End: 2022-12-03

## 2022-11-27 NOTE — PATIENT INSTRUCTIONS
Continue previously prescribed cough medicine and albuterol as needed.  You may try Mucinex over-the-counter for chest congestion.    Tylenol for pain and fever, increase fluids and rest.    If symptoms worsen or do not improve follow up with your PCP or visit your nearest Urgent Care Center or ER.

## 2022-11-27 NOTE — PROGRESS NOTES
Subjective   Chief Complaint   Patient presents with   • Influenza       Edyta Franco is a 52 y.o. female.     History of Present Illness  Pt reports testing positve for FLU 5 days ago with symptoms starting 6 days ago. She states body aches, congestion, runny nose etc have improved but her cough is worsening. Cough is deep, affecting sleep and is associated with wheezing and SOA. Cough is productive but she has not looked at the sputum.  She states she has had bronchitis and pneumonia in the past and she is concerned about her progressing cough.  Influenza  This is a new problem. Episode onset: 6 days. The problem occurs constantly. Associated symptoms include coughing, fatigue and a fever (99). Pertinent negatives include no abdominal pain, anorexia, arthralgias, change in bowel habit, chest pain, chills, congestion, diaphoresis, myalgias, nausea, sore throat or vomiting. Treatments tried: tamiflu, albuterol, promethazine dm.        Allergies   Allergen Reactions   • Penicillins Hives       Past Medical History:   Diagnosis Date   • Epilepsy (HCC) 2020    Patient reported she was taken off her medication for this apx 10 years ago.  Reported no seizure activity for apx. 8 years ago.     • Fatigue    • Hypercholesteremia    • Hypertension    • Lichen sclerosus of female genitalia    • Rectal bleeding 2020   • Vulva cancer (HCC)    • Wears contact lenses 2020    Patient advised contacts will need to be removed DOS       Past Surgical History:   Procedure Laterality Date   •  SECTION     • COLONOSCOPY N/A 2020    Procedure: COLONOSCOPY WITH BIOPSY;  Surgeon: Eleni Russell MD;  Location: [facility] ENDOSCOPY;  Service: Gastroenterology;  Laterality: N/A;   • HAND SURGERY Right 2014   • LUMBAR LAMINECTOMY  2011    HEMILAMI RT-C7-T1, RT C7-T1 MEDIAL FACET & CT-T1 DISC (Dr. Cabrera   • ORIF ANKLE FRACTURE Right    • TOTAL ABDOMINAL HYSTERECTOMY WITH SALPINGO OOPHORECTOMY       Dr. Richard Armendariz - [place] , [place]    • VULVECTOMY N/A 08/30/2017    Procedure: MODIFIED RADICAL VULVECTOMY, BILATERAL SENTINAL LYMPH NODE DISSECTION, MAPPING BIOPSIES OF VULVA;  Surgeon: Isabela Nassar MD;  Location: [facility];  Service:        Social History     Socioeconomic History   • Marital status:    • Number of children: 2   Tobacco Use   • Smoking status: Never   • Smokeless tobacco: Never   Vaping Use   • Vaping Use: Never used   Substance and Sexual Activity   • Alcohol use: Yes     Comment: 2x month   • Drug use: Never   • Sexual activity: Yes     Partners: Male     Birth control/protection: Surgical       Family History   Problem Relation Age of Onset   • Diabetes type II Father    • Congenital heart disease Father    • Hypertension Father    • Hyperlipidemia Father    • Diabetes type II Mother    • Congenital heart disease Mother    • Hypertension Mother    • Stroke Mother    • Hyperlipidemia Mother    • Heart attack Other    • Hyperlipidemia Other    • Breast cancer Neg Hx    • Colon cancer Neg Hx    • Uterine cancer Neg Hx    • Ovarian cancer Neg Hx          Current Outpatient Medications:   •  albuterol sulfate  (90 Base) MCG/ACT inhaler, Inhale 2 puffs Every 4 (Four) Hours As Needed for Wheezing or Shortness of Air., Disp: 8 g, Rfl: 0  •  azithromycin (Zithromax Z-Saravanan) 250 MG tablet, Take 2 tablets by mouth on day 1, then 1 tablet daily on days 2-5, Disp: 6 tablet, Rfl: 0  •  benzonatate (TESSALON) 200 MG capsule, Take 1 capsule by mouth 3 (Three) Times a Day As Needed for Cough., Disp: 28 capsule, Rfl: 0  •  clobetasol (TEMOVATE) 0.05 % ointment, Apply 1 application topically to the appropriate area as directed 2 (Two) Times a Day., Disp: 45 g, Rfl: 3  •  estradiol (ESTRACE) 0.1 MG/GM vaginal cream, Place small amount cream on vulvar lesion 2-3 times per week, Disp: 43 g, Rfl: 5  •  estradiol (ESTRACE) 0.5 MG tablet, Take 1 tablet by mouth Daily., Disp: 90 tablet, Rfl: 3  •   fenofibrate (TRICOR) 54 MG tablet, Take 1 tablet by mouth once daily, Disp: 90 tablet, Rfl: 0  •  lidocaine (XYLOCAINE) 5 % ointment, Apply 1 application topically to the appropriate area as directed 3 (Three) Times a Day., Disp: 50 g, Rfl: 0  •  nebivolol (Bystolic) 5 MG tablet, Take 1 tablet by mouth Daily., Disp: 90 tablet, Rfl: 1  •  oseltamivir (Tamiflu) 75 MG capsule, Take 1 capsule by mouth 2 (Two) Times a Day for 5 days., Disp: 10 capsule, Rfl: 0  •  predniSONE (DELTASONE) 10 MG (21) dose pack, Take  by mouth Daily for 6 days., Disp: 21 each, Rfl: 0  •  promethazine-dextromethorphan (PROMETHAZINE-DM) 6.25-15 MG/5ML syrup, Take 5 mL by mouth At Night As Needed for Cough., Disp: 118 mL, Rfl: 0  •  rosuvastatin (CRESTOR) 40 MG tablet, TAKE 1 TABLET BY MOUTH AT NIGHT, Disp: 90 tablet, Rfl: 0  •  Scopolamine (Transderm-Scop, 1.5 MG,) 1 MG/3DAYS patch, Place 1 patch on the skin as directed by provider Every 72 (Seventy-Two) Hours., Disp: 4 patch, Rfl: 0  •  torsemide (DEMADEX) 10 MG tablet, Take 1 tablet by mouth once daily, Disp: 90 tablet, Rfl: 0  •  traMADol (ULTRAM) 50 MG tablet, Take 1 tablet by mouth Every 6 (Six) Hours As Needed for Moderate Pain., Disp: 10 tablet, Rfl: 0      Review of Systems   Constitutional: Positive for fatigue and fever (99). Negative for chills and diaphoresis.   HENT: Negative for congestion, rhinorrhea and sore throat.    Respiratory: Positive for cough, shortness of breath and wheezing. Negative for chest tightness.    Cardiovascular: Negative for chest pain.   Gastrointestinal: Negative for abdominal pain, anorexia, change in bowel habit, nausea and vomiting.   Musculoskeletal: Negative for arthralgias and myalgias.   Neurological: Negative for headache.        There were no vitals filed for this visit.    Objective   Physical Exam  Constitutional:       General: She is not in acute distress.     Appearance: She is ill-appearing. She is not toxic-appearing or diaphoretic.   HENT:       Head: Normocephalic.      Mouth/Throat:      Lips: Pink.      Mouth: Mucous membranes are moist.   Pulmonary:      Effort: Pulmonary effort is normal.      Comments: Frequent deep cough during exam  Neurological:      Mental Status: She is alert and oriented to person, place, and time.   Psychiatric:         Mood and Affect: Mood normal.         Behavior: Behavior normal.          Procedures     Assessment & Plan   Diagnoses and all orders for this visit:    1. Influenza (Primary)  -     benzonatate (TESSALON) 200 MG capsule; Take 1 capsule by mouth 3 (Three) Times a Day As Needed for Cough.  Dispense: 28 capsule; Refill: 0    2. Lower respiratory infection (e.g., bronchitis, pneumonia, pneumonitis, pulmonitis)  -     benzonatate (TESSALON) 200 MG capsule; Take 1 capsule by mouth 3 (Three) Times a Day As Needed for Cough.  Dispense: 28 capsule; Refill: 0  -     azithromycin (Zithromax Z-Saravanan) 250 MG tablet; Take 2 tablets by mouth on day 1, then 1 tablet daily on days 2-5  Dispense: 6 tablet; Refill: 0  -     predniSONE (DELTASONE) 10 MG (21) dose pack; Take  by mouth Daily for 6 days.  Dispense: 21 each; Refill: 0    Continue previously prescribed cough medicine and albuterol as needed.  You may try Mucinex over-the-counter for chest congestion.    Tylenol for pain and fever, increase fluids and rest.    If symptoms worsen or do not improve follow up with your PCP or visit your nearest Urgent Care Center or ER.    Results for orders placed or performed during the hospital encounter of 11/22/22   Covid-19 + Flu A&B AG, [id number]    Specimen: Swab   Result Value Ref Range    COVID19 Not Detected     Influenza A Antigen RANJAN Detected (A)     Influenza B Antigen RANJAN Not Detected     Internal Control Passed     Lot Number [id number]     Expiration Date 2,212,023    POC Rapid Strep A    Specimen: Swab   Result Value Ref Range    Rapid Strep A Screen Negative     Internal Control Passed     Lot Number [id number]      Expiration Date 7,122,024        PLAN: Discussed dosing, side effects, recommended other symptomatic care.  Patient should follow up with primary care provider, Urgent Care or ER if symptoms worsen, fail to resolve or other symptoms need attention. Patient/family agree to the above.         ISAIAH Dexter     The use of a video visit has been reviewed with the patient and verbal informed consent has been obtained. Myself and Edyta Franco participated in this visit. The patient is located at [address] [place] [address]. I am located in [place], [place]. Mychart and Zoom were utilized.        This visit was performed via Telehealth.  This patient has been instructed to follow-up with their primary care provider if their symptoms worsen or the treatment provided does not resolve their illness.

## 2022-12-14 ENCOUNTER — PATIENT MESSAGE (OUTPATIENT)
Dept: GYNECOLOGIC ONCOLOGY | Facility: CLINIC | Age: 52
End: 2022-12-14

## 2022-12-16 ENCOUNTER — PROCEDURE VISIT (OUTPATIENT)
Dept: GYNECOLOGIC ONCOLOGY | Facility: CLINIC | Age: 52
End: 2022-12-16

## 2022-12-16 VITALS
RESPIRATION RATE: 14 BRPM | BODY MASS INDEX: 29.87 KG/M2 | DIASTOLIC BLOOD PRESSURE: 87 MMHG | OXYGEN SATURATION: 96 % | SYSTOLIC BLOOD PRESSURE: 163 MMHG | HEIGHT: 61 IN | TEMPERATURE: 97.1 F | HEART RATE: 70 BPM

## 2022-12-16 DIAGNOSIS — L90.0 LICHEN SCLEROSUS ET ATROPHICUS: Primary | ICD-10-CM

## 2022-12-16 PROCEDURE — 99212 OFFICE O/P EST SF 10 MIN: CPT | Performed by: OBSTETRICS & GYNECOLOGY

## 2022-12-16 NOTE — PROGRESS NOTES
Edyta Franco  [id number]  1970      Reason for visit:  History of vulvar cancer, concern for new lesions    History of present illness:  The patient is a 52 y.o. year old female who presents today for treatment and evaluation of the above issues.    Today, patient continues to have significant irritation at the vulvovaginal area.'s were some days than others.  Today is a good day.  She had spotting yesterday.  She states that the pain is so bad she has difficulty urinating.  She has tried to increase liquids and attempt to dilute her urine and see if that makes urinating more tolerable, but has not found this to be particularly successful.  She is wondering about neck steps.  She has had difficulty tolerating her clobetasol.  Oncologic History:  Oncology/Hematology History   Vulvar cancer, carcinoma (HCC)   8/14/2017 Biopsy    History of lichens sclerosus, on clobetasol therapy. Pt presented to Dr. Abigail Fong with c/o painful ulcerative left vulvar lesion. Biopsy revealed invasive SCC, extending into deep margin at least 3.5 mm. Referred to Gyn Oncology     8/30/2017 Surgery    Modified radical vulvectomy, left inguinofemoral sentinel lymph node dissection, right inguinofemoral lymph node dissection, and mapping biopsies of the vulva. Surgery by Dr. Nassar and Dr. Bree Russell.     Pathology revealed SCC, focally keratinizing with 0.3 cm invasion. LVSI negative, lymph nodes negative, mapping biopsies negative. Stage IB grade 2       9/24/2017 - 9/27/2017 Other Event    Inpatient hospital admission for inguinal incision cellulitis, inguinal fluid collection, and SIRS. Treated with IV antibiotics and sent home on oral antibiotics.      10/26/2017 - 10/27/2017 Other Event    2 day inpatient admission for recurrent cellulitis, resolved with antibiotic management           Past Medical History:   Diagnosis Date   • Epilepsy (HCC) 03/20/2020    Patient reported she was taken off her medication for this apx  10 years ago.  Reported no seizure activity for apx. 8 years ago.     • Fatigue    • Hypercholesteremia    • Hypertension    • Lichen sclerosus of female genitalia    • Rectal bleeding 2020   • Vulva cancer (HCC)    • Wears contact lenses 2020    Patient advised contacts will need to be removed DOS       Past Surgical History:   Procedure Laterality Date   •  SECTION     • COLONOSCOPY N/A 2020    Procedure: COLONOSCOPY WITH BIOPSY;  Surgeon: Eleni Russell MD;  Location: [facility] ENDOSCOPY;  Service: Gastroenterology;  Laterality: N/A;   • HAND SURGERY Right 2014   • LUMBAR LAMINECTOMY  2011    HEMILAMI RT-C7-T1, RT C7-T1 MEDIAL FACET & CT-T1 DISC (Dr. Cabrera   • ORIF ANKLE FRACTURE Right    • TOTAL ABDOMINAL HYSTERECTOMY WITH SALPINGO OOPHORECTOMY      Dr. Richard Armendariz - [place]    • VULVECTOMY N/A 2017    Procedure: MODIFIED RADICAL VULVECTOMY, BILATERAL SENTINAL LYMPH NODE DISSECTION, MAPPING BIOPSIES OF VULVA;  Surgeon: Isabela Nassar MD;  Location: [facility] OR;  Service:        MEDICATIONS: The current medication list was reviewed with the patient and updated in the EMR this date per the Medical Assistant. Medication dosages and frequencies were confirmed to be accurate.      Allergies:  is allergic to penicillins.    Social History:   Social History     Socioeconomic History   • Marital status:    • Number of children: 2   Tobacco Use   • Smoking status: Never   • Smokeless tobacco: Never   Vaping Use   • Vaping Use: Never used   Substance and Sexual Activity   • Alcohol use: Yes     Comment: 2x month   • Drug use: Never   • Sexual activity: Yes     Partners: Male     Birth control/protection: Surgical       Family History:    Family History   Problem Relation Age of Onset   • Diabetes type II Father    • Congenital heart disease Father    • Hypertension Father    • Hyperlipidemia Father    • Diabetes type II Mother    • Congenital heart disease  "Mother    • Hypertension Mother    • Stroke Mother    • Hyperlipidemia Mother    • Heart attack Other    • Hyperlipidemia Other    • Breast cancer Neg Hx    • Colon cancer Neg Hx    • Uterine cancer Neg Hx    • Ovarian cancer Neg Hx        Health Maintenance:    Health Maintenance   Topic Date Due   • DXA SCAN  Never done   • COVID-19 Vaccine (1) Never done   • HEPATITIS C SCREENING  Never done   • LIPID PANEL  02/19/2019   • ZOSTER VACCINE (1 of 2) Never done   • ANNUAL PHYSICAL  02/25/2021   • INFLUENZA VACCINE  08/01/2022   • PAP SMEAR  05/25/2024   • MAMMOGRAM  06/22/2024   • TDAP/TD VACCINES (2 - Td or Tdap) 03/01/2030   • COLORECTAL CANCER SCREENING  03/23/2030   • Pneumococcal Vaccine 0-64  Aged Out     Review of Systems  Please refer to HPI, remainder of ROS negative.    Physical Exam    Vitals:    12/16/22 1519   BP: 163/87   Pulse: 70   Resp: 14   Temp: 97.1 °F (36.2 °C)   TempSrc: Temporal   SpO2: 96%   Height: 154.9 cm (60.98\")   PainSc:   4       Body mass index is 29.87 kg/m².  Wt Readings from Last 3 Encounters:   11/22/22 71.7 kg (158 lb)   11/09/22 76.7 kg (169 lb)   10/18/22 77 kg (169 lb 11.2 oz)     GENERAL: Alert, well-appearing female appearing her stated age who is in no apparent distress.    HEENT: Sclera anicteric. Head normocephalic, atraumatic.  CARDIOVASCULAR:  no peripheral edema.  RESPIRATORY:normal respiratory effort  SKIN:  Warm, dry, well-perfused.  All visible areas intact.  No rashes, lesions, ulcers.  PSYCHIATRIC: AO x3, with appropriate affect, normal thought processes.  NEUROLOGIC: No focal deficits.  Moves extremities well.  MUSCULOSKELETAL: Normal gait and station.   EXTREMITIES:   No cyanosis, clubbing, symmetric.     PELVIC exam:    External genitalia consistent with diffuse lichen sclerosis.  Thickened area at base of left labia minora and anterior vaginal introitus, stable.  Possible minimal response to steroid injection.   at right introitus.    ECOG PS " 0    PROCEDURES: None    Diagnostic Data:      Lab Results   Component Value Date    WBC 8.56 10/18/2021    HGB 14.3 10/18/2021    HCT 42.6 10/18/2021    MCV 91.8 10/18/2021     10/18/2021    NEUTROABS 5.33 10/18/2021    GLUCOSE 91 10/18/2021    BUN 11 10/18/2021    CREATININE 0.79 10/18/2021    EGFRIFNONA 77 10/18/2021    EGFRIFAFRI 84 10/28/2020     10/18/2021    K 4.0 10/18/2021     10/18/2021    CO2 26.8 10/18/2021    MG 2.0 11/11/2016    CALCIUM 8.8 10/18/2021    ALBUMIN 4.70 10/18/2021    AST 25 10/18/2021    ALT 25 10/18/2021    BILITOT 0.3 10/18/2021     No results found for:       Assessment & Plan   This is a 52 y.o. woman with history of vulvar cancer, diffuse lichen sclerosis.    Encounter Diagnosis   Name Primary?   • Lichen sclerosus et atrophicus Yes     -Status post intradermal steroid injection without significant improvement  -We will try topical compound including ketamine and lidocaine  -We discussed that surgery would be a last effort.    Pain assessment was performed today as a part of patient’s care.  For patients with pain related to surgery, gynecologic malignancy or cancer treatment, the plan is as noted in the assessment/plan.  For patients with pain not related to these issues, they are to seek any further needed care from a more appropriate provider, such as PCP.      No orders of the defined types were placed in this encounter.    FOLLOW UP: 2 months    I spent 15 minutes caring for Edyta on this date of service. This time includes time spent by me in the following activities: preparing for the visit, reviewing tests, performing a medically appropriate examination and/or evaluation, counseling and educating the patient/family/caregiver, ordering medications, tests, or procedures and documenting information in the medical record      Electronically Signed by: Isabela Nassar MD  Date: 12/16/2022

## 2022-12-30 ENCOUNTER — OFFICE VISIT (OUTPATIENT)
Dept: FAMILY MEDICINE CLINIC | Facility: CLINIC | Age: 52
End: 2022-12-30

## 2022-12-30 VITALS
RESPIRATION RATE: 16 BRPM | OXYGEN SATURATION: 98 % | SYSTOLIC BLOOD PRESSURE: 160 MMHG | DIASTOLIC BLOOD PRESSURE: 100 MMHG | HEIGHT: 61 IN | TEMPERATURE: 97.3 F | BODY MASS INDEX: 33.12 KG/M2 | HEART RATE: 53 BPM | WEIGHT: 175.4 LBS

## 2022-12-30 DIAGNOSIS — I10 HYPERTENSION, ESSENTIAL, BENIGN: Primary | ICD-10-CM

## 2022-12-30 DIAGNOSIS — R00.2 PALPITATIONS: ICD-10-CM

## 2022-12-30 DIAGNOSIS — E55.9 VITAMIN D DEFICIENCY: ICD-10-CM

## 2022-12-30 DIAGNOSIS — E66.09 CLASS 1 OBESITY DUE TO EXCESS CALORIES WITH SERIOUS COMORBIDITY AND BODY MASS INDEX (BMI) OF 33.0 TO 33.9 IN ADULT: ICD-10-CM

## 2022-12-30 DIAGNOSIS — K21.9 GASTROESOPHAGEAL REFLUX DISEASE, UNSPECIFIED WHETHER ESOPHAGITIS PRESENT: ICD-10-CM

## 2022-12-30 DIAGNOSIS — R06.83 LOUD SNORING: ICD-10-CM

## 2022-12-30 DIAGNOSIS — R53.83 OTHER FATIGUE: ICD-10-CM

## 2022-12-30 PROCEDURE — 99214 OFFICE O/P EST MOD 30 MIN: CPT | Performed by: NURSE PRACTITIONER

## 2022-12-30 RX ORDER — MELOXICAM 15 MG/1
TABLET ORAL
COMMUNITY
Start: 2022-12-21 | End: 2023-01-07

## 2022-12-30 RX ORDER — NEBIVOLOL 10 MG/1
10 TABLET ORAL DAILY
Qty: 30 TABLET | Refills: 2 | Status: SHIPPED | OUTPATIENT
Start: 2022-12-30 | End: 2023-01-09 | Stop reason: ALTCHOICE

## 2022-12-30 RX ORDER — OMEPRAZOLE 40 MG/1
40 CAPSULE, DELAYED RELEASE ORAL DAILY
Qty: 30 CAPSULE | Refills: 2 | Status: SHIPPED | OUTPATIENT
Start: 2022-12-30

## 2022-12-30 NOTE — PROGRESS NOTES
Established Patient        Chief Complaint:   Chief Complaint   Patient presents with   • Headache   • Hypertension         History of Present Illness:    Edyta Franco is a 52 y.o. female who presents today for concerns of uncontrolled blood pressure. Patient was previously on Lisinopril. At time of last appointment, patient was concerned regarding worsening headaches and continued blood pressure issues. Nebivolol was added at that time. Patient states that this combination was causing bradycardia with HR in 30s. Lisinopril was discontinued at that time. Patient states that she has been monitoring blood pressure at home, and has been anywhere from 130s/90s-180s/110s. Patient reports chest discomfort/achiness, headaches, SOA. Patient also states that her  has been waking patient up at night for several months due to snoring. Reports that she has been wheezing more frequently lately. Patient is nonsmoker. Patient also reports severe heartburn regardless of what she eats. Patient is taking TUMS very frequently.     Subjective     The following portions of the patient's history were reviewed and updated as appropriate: allergies, current medications, past family history, past medical history, past social history, past surgical history and problem list.    ALLERGIES  Allergies   Allergen Reactions   • Penicillins Hives       ROS  Review of Systems   Constitutional: Positive for fatigue. Negative for fever.   HENT: Negative for congestion.    Respiratory: Positive for cough, shortness of breath and wheezing.    Cardiovascular: Positive for chest pain and palpitations.   Gastrointestinal: Negative for diarrhea, nausea and vomiting.   Neurological: Positive for headaches. Negative for dizziness and weakness.   Psychiatric/Behavioral: Positive for sleep disturbance.   All other systems reviewed and are negative.      Objective     Vital Signs:   /100   Pulse 53   Temp 97.3 °F (36.3  "°C)   Resp 16   Ht 154.9 cm (60.98\")   Wt 79.6 kg (175 lb 6.4 oz)   LMP  (LMP Unknown)   SpO2 98%   BMI 33.16 kg/m²     Physical Exam   Physical Exam  Vitals and nursing note reviewed.   Constitutional:       Appearance: She is obese.   HENT:      Head: Normocephalic.      Nose: Nose normal.      Mouth/Throat:      Lips: Pink.   Eyes:      General: Lids are normal.      Conjunctiva/sclera: Conjunctivae normal.      Pupils: Pupils are equal, round, and reactive to light.   Cardiovascular:      Rate and Rhythm: Normal rate.      Heart sounds: Normal heart sounds.   Pulmonary:      Effort: Pulmonary effort is normal.      Breath sounds: Normal breath sounds.   Abdominal:      General: Bowel sounds are normal.   Musculoskeletal:         General: Normal range of motion.   Skin:     General: Skin is warm and dry.   Neurological:      Mental Status: She is alert and oriented to person, place, and time.      Gait: Gait is intact.   Psychiatric:         Attention and Perception: Attention normal.         Mood and Affect: Mood and affect normal.         Speech: Speech normal.         Behavior: Behavior normal. Behavior is cooperative.         Assessment and Plan      Assessment/Plan:   Diagnoses and all orders for this visit:    1. Hypertension, essential, benign (Primary)  -     nebivolol (BYSTOLIC) 10 MG tablet; Take 1 tablet by mouth Daily.  Dispense: 30 tablet; Refill: 2  -     CBC w AUTO Differential  -     Comprehensive metabolic panel  -     Lipid Panel  -     Ambulatory Referral to Sleep Medicine    2. Vitamin D deficiency  -     Vitamin D 25 hydroxy    3. Other fatigue  -     TSH Rfx On Abnormal To Free T4  -     Vitamin B12  -     Ambulatory Referral to Sleep Medicine    4. Loud snoring  -     Ambulatory Referral to Sleep Medicine    5. Palpitations  -     C-reactive protein  -     BNP ([facility] Only)  -     Holter Monitor - 72 Hour Up To 15 Days; Future    6. Class 1 obesity due to excess calories with serious " comorbidity and body mass index (BMI) of 33.0 to 33.9 in adult  -     Hemoglobin A1c    7. Gastroesophageal reflux disease, unspecified whether esophagitis present  -     omeprazole (priLOSEC) 40 MG capsule; Take 1 capsule by mouth Daily.  Dispense: 30 capsule; Refill: 2        Discussion Summary:  Discussed plan of care in detail with pt today; pt verb understanding and agrees.    Follow up:  Return in about 3 months (around 3/30/2023).     Patient Education:  There are no Patient Instructions on file for this visit.    ISAIAH Gonzales  12/30/22  12:08 EST          Please note that portions of this note may have been completed with a voice recognition program.

## 2022-12-31 LAB
25(OH)D3+25(OH)D2 SERPL-MCNC: 18.3 NG/ML (ref 30–100)
ALBUMIN SERPL-MCNC: 4.4 G/DL (ref 3.5–5.2)
ALBUMIN/GLOB SERPL: 1.8 G/DL
ALP SERPL-CCNC: 48 U/L (ref 39–117)
ALT SERPL-CCNC: 27 U/L (ref 1–33)
AST SERPL-CCNC: 18 U/L (ref 1–32)
BASOPHILS # BLD AUTO: 0.06 10*3/MM3 (ref 0–0.2)
BASOPHILS NFR BLD AUTO: 0.9 % (ref 0–1.5)
BILIRUB SERPL-MCNC: 0.3 MG/DL (ref 0–1.2)
BNP SERPL-MCNC: 24.3 PG/ML (ref 0–100)
BUN SERPL-MCNC: 11 MG/DL (ref 6–20)
BUN/CREAT SERPL: 14.9 (ref 7–25)
CALCIUM SERPL-MCNC: 9.5 MG/DL (ref 8.6–10.5)
CHLORIDE SERPL-SCNC: 101 MMOL/L (ref 98–107)
CHOLEST SERPL-MCNC: 279 MG/DL (ref 0–200)
CO2 SERPL-SCNC: 29 MMOL/L (ref 22–29)
CREAT SERPL-MCNC: 0.74 MG/DL (ref 0.57–1)
CRP SERPL-MCNC: 3 MG/L (ref 0–10)
EGFRCR SERPLBLD CKD-EPI 2021: 97.5 ML/MIN/1.73
EOSINOPHIL # BLD AUTO: 0.15 10*3/MM3 (ref 0–0.4)
EOSINOPHIL NFR BLD AUTO: 2.2 % (ref 0.3–6.2)
ERYTHROCYTE [DISTWIDTH] IN BLOOD BY AUTOMATED COUNT: 12.4 % (ref 12.3–15.4)
GLOBULIN SER CALC-MCNC: 2.4 GM/DL
GLUCOSE SERPL-MCNC: 98 MG/DL (ref 65–99)
HBA1C MFR BLD: 5.8 % (ref 4.8–5.6)
HCT VFR BLD AUTO: 43.3 % (ref 34–46.6)
HDLC SERPL-MCNC: 54 MG/DL (ref 40–60)
HGB BLD-MCNC: 14.9 G/DL (ref 12–15.9)
IMM GRANULOCYTES # BLD AUTO: 0.02 10*3/MM3 (ref 0–0.05)
IMM GRANULOCYTES NFR BLD AUTO: 0.3 % (ref 0–0.5)
LDLC SERPL CALC-MCNC: 150 MG/DL (ref 0–100)
LYMPHOCYTES # BLD AUTO: 1.82 10*3/MM3 (ref 0.7–3.1)
LYMPHOCYTES NFR BLD AUTO: 26.5 % (ref 19.6–45.3)
MCH RBC QN AUTO: 30.1 PG (ref 26.6–33)
MCHC RBC AUTO-ENTMCNC: 34.4 G/DL (ref 31.5–35.7)
MCV RBC AUTO: 87.5 FL (ref 79–97)
MONOCYTES # BLD AUTO: 0.46 10*3/MM3 (ref 0.1–0.9)
MONOCYTES NFR BLD AUTO: 6.7 % (ref 5–12)
NEUTROPHILS # BLD AUTO: 4.37 10*3/MM3 (ref 1.7–7)
NEUTROPHILS NFR BLD AUTO: 63.4 % (ref 42.7–76)
NRBC BLD AUTO-RTO: 0 /100 WBC (ref 0–0.2)
PLATELET # BLD AUTO: 257 10*3/MM3 (ref 140–450)
POTASSIUM SERPL-SCNC: 4.5 MMOL/L (ref 3.5–5.2)
PROT SERPL-MCNC: 6.8 G/DL (ref 6–8.5)
RBC # BLD AUTO: 4.95 10*6/MM3 (ref 3.77–5.28)
SODIUM SERPL-SCNC: 141 MMOL/L (ref 136–145)
TRIGL SERPL-MCNC: 403 MG/DL (ref 0–150)
TSH SERPL DL<=0.005 MIU/L-ACNC: 2.73 UIU/ML (ref 0.27–4.2)
VIT B12 SERPL-MCNC: 290 PG/ML (ref 211–946)
VLDLC SERPL CALC-MCNC: 75 MG/DL (ref 5–40)
WBC # BLD AUTO: 6.88 10*3/MM3 (ref 3.4–10.8)

## 2023-01-06 ENCOUNTER — HOSPITAL ENCOUNTER (EMERGENCY)
Facility: HOSPITAL | Age: 53
Discharge: HOME | End: 2023-01-07
Attending: EMERGENCY MEDICINE | Admitting: EMERGENCY MEDICINE
Payer: COMMERCIAL

## 2023-01-06 ENCOUNTER — APPOINTMENT (OUTPATIENT)
Dept: GENERAL RADIOLOGY | Facility: HOSPITAL | Age: 53
End: 2023-01-06
Payer: COMMERCIAL

## 2023-01-06 DIAGNOSIS — M79.601 PAIN IN BOTH UPPER EXTREMITIES: ICD-10-CM

## 2023-01-06 DIAGNOSIS — R68.84 JAW PAIN: Primary | ICD-10-CM

## 2023-01-06 DIAGNOSIS — M79.602 PAIN IN BOTH UPPER EXTREMITIES: ICD-10-CM

## 2023-01-06 LAB
ALBUMIN SERPL-MCNC: 4.3 G/DL (ref 3.5–5.2)
ALBUMIN/GLOB SERPL: 1.5 G/DL
ALP SERPL-CCNC: 48 U/L (ref 39–117)
ALT SERPL W P-5'-P-CCNC: 19 U/L (ref 1–33)
ANION GAP SERPL CALCULATED.3IONS-SCNC: 11.3 MMOL/L (ref 5–15)
AST SERPL-CCNC: 18 U/L (ref 1–32)
BASOPHILS # BLD AUTO: 0.06 10*3/MM3 (ref 0–0.2)
BASOPHILS NFR BLD AUTO: 0.6 % (ref 0–1.5)
BILIRUB SERPL-MCNC: 0.2 MG/DL (ref 0–1.2)
BUN SERPL-MCNC: 13 MG/DL (ref 6–20)
BUN/CREAT SERPL: 17.3 (ref 7–25)
CALCIUM SPEC-SCNC: 9.4 MG/DL (ref 8.6–10.5)
CHLORIDE SERPL-SCNC: 101 MMOL/L (ref 98–107)
CO2 SERPL-SCNC: 24.7 MMOL/L (ref 22–29)
CREAT SERPL-MCNC: 0.75 MG/DL (ref 0.57–1)
DEPRECATED RDW RBC AUTO: 40.9 FL (ref 37–54)
EGFRCR SERPLBLD CKD-EPI 2021: 95.9 ML/MIN/1.73
EOSINOPHIL # BLD AUTO: 0.15 10*3/MM3 (ref 0–0.4)
EOSINOPHIL NFR BLD AUTO: 1.4 % (ref 0.3–6.2)
ERYTHROCYTE [DISTWIDTH] IN BLOOD BY AUTOMATED COUNT: 12.5 % (ref 12.3–15.4)
GLOBULIN UR ELPH-MCNC: 2.8 GM/DL
GLUCOSE SERPL-MCNC: 169 MG/DL (ref 65–99)
HCT VFR BLD AUTO: 41.3 % (ref 34–46.6)
HGB BLD-MCNC: 14.1 G/DL (ref 12–15.9)
IMM GRANULOCYTES # BLD AUTO: 0.03 10*3/MM3 (ref 0–0.05)
IMM GRANULOCYTES NFR BLD AUTO: 0.3 % (ref 0–0.5)
LYMPHOCYTES # BLD AUTO: 2.01 10*3/MM3 (ref 0.7–3.1)
LYMPHOCYTES NFR BLD AUTO: 19.2 % (ref 19.6–45.3)
MAGNESIUM SERPL-MCNC: 2.1 MG/DL (ref 1.6–2.6)
MCH RBC QN AUTO: 30.7 PG (ref 26.6–33)
MCHC RBC AUTO-ENTMCNC: 34.1 G/DL (ref 31.5–35.7)
MCV RBC AUTO: 89.8 FL (ref 79–97)
MONOCYTES # BLD AUTO: 0.6 10*3/MM3 (ref 0.1–0.9)
MONOCYTES NFR BLD AUTO: 5.7 % (ref 5–12)
NEUTROPHILS NFR BLD AUTO: 7.6 10*3/MM3 (ref 1.7–7)
NEUTROPHILS NFR BLD AUTO: 72.8 % (ref 42.7–76)
NRBC BLD AUTO-RTO: 0 /100 WBC (ref 0–0.2)
PLATELET # BLD AUTO: 225 10*3/MM3 (ref 140–450)
PMV BLD AUTO: 9.6 FL (ref 6–12)
POTASSIUM SERPL-SCNC: 4 MMOL/L (ref 3.5–5.2)
PROT SERPL-MCNC: 7.1 G/DL (ref 6–8.5)
RBC # BLD AUTO: 4.6 10*6/MM3 (ref 3.77–5.28)
SODIUM SERPL-SCNC: 137 MMOL/L (ref 136–145)
TROPONIN T SERPL-MCNC: <0.01 NG/ML (ref 0–0.03)
WBC NRBC COR # BLD: 10.45 10*3/MM3 (ref 3.4–10.8)

## 2023-01-06 PROCEDURE — 25010000002 KETOROLAC TROMETHAMINE PER 15 MG

## 2023-01-06 PROCEDURE — 25010000002 METHYLPREDNISOLONE PER 125 MG

## 2023-01-06 PROCEDURE — 85025 COMPLETE CBC W/AUTO DIFF WBC: CPT

## 2023-01-06 PROCEDURE — 93005 ELECTROCARDIOGRAM TRACING: CPT

## 2023-01-06 PROCEDURE — 36415 COLL VENOUS BLD VENIPUNCTURE: CPT

## 2023-01-06 PROCEDURE — 80053 COMPREHEN METABOLIC PANEL: CPT

## 2023-01-06 PROCEDURE — 99283 EMERGENCY DEPT VISIT LOW MDM: CPT

## 2023-01-06 PROCEDURE — 96375 TX/PRO/DX INJ NEW DRUG ADDON: CPT

## 2023-01-06 PROCEDURE — 93005 ELECTROCARDIOGRAM TRACING: CPT | Performed by: EMERGENCY MEDICINE

## 2023-01-06 PROCEDURE — 99284 EMERGENCY DEPT VISIT MOD MDM: CPT

## 2023-01-06 PROCEDURE — 84484 ASSAY OF TROPONIN QUANT: CPT

## 2023-01-06 PROCEDURE — 83735 ASSAY OF MAGNESIUM: CPT

## 2023-01-06 PROCEDURE — 96374 THER/PROPH/DIAG INJ IV PUSH: CPT

## 2023-01-06 PROCEDURE — 71045 X-RAY EXAM CHEST 1 VIEW: CPT

## 2023-01-06 RX ORDER — KETOROLAC TROMETHAMINE 30 MG/ML
15 INJECTION, SOLUTION INTRAMUSCULAR; INTRAVENOUS ONCE
Status: COMPLETED | OUTPATIENT
Start: 2023-01-06 | End: 2023-01-06

## 2023-01-06 RX ORDER — SODIUM CHLORIDE 0.9 % (FLUSH) 0.9 %
10 SYRINGE (ML) INJECTION AS NEEDED
Status: DISCONTINUED | OUTPATIENT
Start: 2023-01-06 | End: 2023-01-07 | Stop reason: HOSPADM

## 2023-01-06 RX ORDER — METHYLPREDNISOLONE SODIUM SUCCINATE 125 MG/2ML
125 INJECTION, POWDER, LYOPHILIZED, FOR SOLUTION INTRAMUSCULAR; INTRAVENOUS ONCE
Status: COMPLETED | OUTPATIENT
Start: 2023-01-06 | End: 2023-01-06

## 2023-01-06 RX ADMIN — METHYLPREDNISOLONE SODIUM SUCCINATE 125 MG: 125 INJECTION, POWDER, FOR SOLUTION INTRAMUSCULAR; INTRAVENOUS at 23:52

## 2023-01-06 RX ADMIN — KETOROLAC TROMETHAMINE 15 MG: 30 INJECTION, SOLUTION INTRAMUSCULAR; INTRAVENOUS at 23:52

## 2023-01-07 VITALS
HEIGHT: 61 IN | HEART RATE: 88 BPM | BODY MASS INDEX: 33.57 KG/M2 | WEIGHT: 177.8 LBS | RESPIRATION RATE: 16 BRPM | SYSTOLIC BLOOD PRESSURE: 141 MMHG | DIASTOLIC BLOOD PRESSURE: 78 MMHG | TEMPERATURE: 98 F | OXYGEN SATURATION: 99 %

## 2023-01-07 LAB
HOLD SPECIMEN: NORMAL
HOLD SPECIMEN: NORMAL
TROPONIN T SERPL-MCNC: <0.01 NG/ML (ref 0–0.03)
WHOLE BLOOD HOLD COAG: NORMAL
WHOLE BLOOD HOLD SPECIMEN: NORMAL

## 2023-01-07 PROCEDURE — 96375 TX/PRO/DX INJ NEW DRUG ADDON: CPT

## 2023-01-07 PROCEDURE — 84484 ASSAY OF TROPONIN QUANT: CPT

## 2023-01-07 PROCEDURE — 25010000002 ORPHENADRINE CITRATE PER 60 MG

## 2023-01-07 RX ORDER — METAXALONE 800 MG/1
800 TABLET ORAL 3 TIMES DAILY PRN
Qty: 15 TABLET | Refills: 0 | Status: SHIPPED | OUTPATIENT
Start: 2023-01-07 | End: 2023-03-29

## 2023-01-07 RX ORDER — ORPHENADRINE CITRATE 30 MG/ML
60 INJECTION INTRAMUSCULAR; INTRAVENOUS ONCE
Status: COMPLETED | OUTPATIENT
Start: 2023-01-07 | End: 2023-01-07

## 2023-01-07 RX ADMIN — ORPHENADRINE CITRATE 60 MG: 30 INJECTION INTRAMUSCULAR; INTRAVENOUS at 00:54

## 2023-01-07 NOTE — ED PROVIDER NOTES
Subjective  History of Present Illness:    Patient is a 52-year-old female here today with jaw pain, arm pain, and abdominal pain.  She states that the jaw pain started at around 7 PM tonight, feels like she cannot open her mouth completely or will have worsening pain.  Also has pain when she clenches her mouth.  Around the same time she started to experience pain to both antecubital areas.  She describes them as feeling heavy and will have improvement when she flexes her elbows.  At that time she thought it might be related to her blood pressure and check it at home with a reading of 150/97.  Then the patient started to notice lower abdominal pain that seems to have localized in the right lower quadrant.  She was recently seen at her primary provider's office for her blood pressure on  and had her nebivolol increased.  Patient denies nausea, vomiting, diarrhea and headache as well as vision changes and chest pain.  She does feel short of breath and lightheaded.      Nurses Notes reviewed and agree, including vitals, allergies, social history and prior medical history.     REVIEW OF SYSTEMS: All systems reviewed and not pertinent unless noted.  Review of Systems    Past Medical History:   Diagnosis Date   • Epilepsy (HCC) 2020    Patient reported she was taken off her medication for this apx 10 years ago.  Reported no seizure activity for apx. 8 years ago.     • Fatigue    • Hypercholesteremia    • Hypertension    • Lichen sclerosus of female genitalia    • Rectal bleeding 2020   • Vulva cancer (HCC)    • Wears contact lenses 2020    Patient advised contacts will need to be removed DOS       Allergies:    Penicillins      Past Surgical History:   Procedure Laterality Date   •  SECTION     • COLONOSCOPY N/A 2020    Procedure: COLONOSCOPY WITH BIOPSY;  Surgeon: Eleni Russell MD;  Location: [facility] ENDOSCOPY;  Service: Gastroenterology;  Laterality: N/A;   • HAND SURGERY  "Right 2014   • LUMBAR LAMINECTOMY  12/13/2011    HEMILAMI RT-C7-T1, RT C7-T1 MEDIAL FACET & CT-T1 DISC (Dr. Cabrera   • ORIF ANKLE FRACTURE Right    • TOTAL ABDOMINAL HYSTERECTOMY WITH SALPINGO OOPHORECTOMY  2001    Dr. Richard Armendariz - [place]    • VULVECTOMY N/A 08/30/2017    Procedure: MODIFIED RADICAL VULVECTOMY, BILATERAL SENTINAL LYMPH NODE DISSECTION, MAPPING BIOPSIES OF VULVA;  Surgeon: Isabela Nassar MD;  Location: [facility];  Service:          Social History     Socioeconomic History   • Marital status:    • Number of children: 2   Tobacco Use   • Smoking status: Never     Passive exposure: Never   • Smokeless tobacco: Never   Vaping Use   • Vaping Use: Never used   Substance and Sexual Activity   • Alcohol use: Yes     Comment: 2x month   • Drug use: Never   • Sexual activity: Yes     Partners: Male     Birth control/protection: Surgical         Family History   Problem Relation Age of Onset   • Diabetes type II Father    • Congenital heart disease Father    • Hypertension Father    • Hyperlipidemia Father    • Diabetes type II Mother    • Congenital heart disease Mother    • Hypertension Mother    • Stroke Mother    • Hyperlipidemia Mother    • Heart attack Other    • Hyperlipidemia Other    • Breast cancer Neg Hx    • Colon cancer Neg Hx    • Uterine cancer Neg Hx    • Ovarian cancer Neg Hx        Objective  Physical Exam:  /75   Pulse 62   Temp 98 °F (36.7 °C) (Oral)   Resp 18   Ht 154.9 cm (61\")   Wt 80.6 kg (177 lb 12.8 oz)   LMP  (LMP Unknown)   SpO2 97%   BMI 33.60 kg/m²      Physical Exam  Vitals and nursing note reviewed.   Constitutional:       Appearance: Normal appearance.   HENT:      Head: Normocephalic and atraumatic.      Right Ear: Tympanic membrane, ear canal and external ear normal.      Left Ear: Tympanic membrane, ear canal and external ear normal.      Nose: Nose normal.      Mouth/Throat:      Mouth: Mucous membranes are moist.      Pharynx: Oropharynx " is clear.   Eyes:      Extraocular Movements: Extraocular movements intact.      Conjunctiva/sclera: Conjunctivae normal.      Pupils: Pupils are equal, round, and reactive to light.   Neck:      Vascular: No carotid bruit.   Cardiovascular:      Rate and Rhythm: Normal rate and regular rhythm.      Pulses: Normal pulses.      Heart sounds: Normal heart sounds.   Pulmonary:      Effort: Pulmonary effort is normal.      Breath sounds: Normal breath sounds.   Abdominal:      General: Abdomen is flat. Bowel sounds are normal. There is no distension.      Palpations: Abdomen is soft.      Tenderness: There is no abdominal tenderness.   Musculoskeletal:      Cervical back: Neck supple. No tenderness.      Right lower leg: No edema.      Left lower leg: No edema.   Lymphadenopathy:      Cervical: No cervical adenopathy.   Skin:     General: Skin is warm and dry.      Capillary Refill: Capillary refill takes less than 2 seconds.   Neurological:      General: No focal deficit present.      Mental Status: She is alert and oriented to person, place, and time.   Psychiatric:         Mood and Affect: Mood normal.         Behavior: Behavior normal.         Procedures    ED Course:    ED Course as of 01/07/23 0228 Fri Jan 06, 2023 2311 EKG interpreted by me reveals sinus rhythm with rate of 53 bpm.  There are few nonspecific T wave changes.  This is an atypical appearing EKG. [TB]      ED Course User Index  [TB] Marla Vallejo MD       Lab Results (last 24 hours)     Procedure Component Value Units Date/Time    CBC & Differential [[id number]]  (Abnormal) Collected: 01/06/23 2308    Specimen: Blood Updated: 01/06/23 2322    Narrative:      The following orders were created for panel order CBC & Differential.  Procedure                               Abnormality         Status                     ---------                               -----------         ------                     CBC Auto Differential[[id number]]         Abnormal            Final result                 Please view results for these tests on the individual orders.    Comprehensive Metabolic Panel [[id number]]  (Abnormal) Collected: 01/06/23 2308    Specimen: Blood Updated: 01/06/23 2338     Glucose 169 mg/dL      BUN 13 mg/dL      Creatinine 0.75 mg/dL      Sodium 137 mmol/L      Potassium 4.0 mmol/L      Comment: Slight hemolysis detected by analyzer. Results may be affected.        Chloride 101 mmol/L      CO2 24.7 mmol/L      Calcium 9.4 mg/dL      Total Protein 7.1 g/dL      Albumin 4.3 g/dL      ALT (SGPT) 19 U/L      AST (SGOT) 18 U/L      Alkaline Phosphatase 48 U/L      Total Bilirubin 0.2 mg/dL      Globulin 2.8 gm/dL      A/G Ratio 1.5 g/dL      BUN/Creatinine Ratio 17.3     Anion Gap 11.3 mmol/L      eGFR 95.9 mL/min/1.73      Comment: National Kidney Foundation and American Society of Nephrology (ASN) Task Force recommended calculation based on the Chronic Kidney Disease Epidemiology Collaboration (CKD-EPI) equation refit without adjustment for race.       Narrative:      GFR Normal >60  Chronic Kidney Disease <60  Kidney Failure <15      Troponin [[id number]]  (Normal) Collected: 01/06/23 2308    Specimen: Blood Updated: 01/06/23 2340     Troponin T <0.010 ng/mL     Narrative:      Troponin T Reference Range:  <= 0.03 ng/mL-   Negative for AMI  >0.03 ng/mL-     Abnormal for myocardial necrosis.  Clinicians would have to utilize clinical acumen, EKG, Troponin and serial changes to determine if it is an Acute Myocardial Infarction or myocardial injury due to an underlying chronic condition.       Results may be falsely decreased if patient taking Biotin.      CBC Auto Differential [[id number]]  (Abnormal) Collected: 01/06/23 2308    Specimen: Blood Updated: 01/06/23 2322     WBC 10.45 10*3/mm3      RBC 4.60 10*6/mm3      Hemoglobin 14.1 g/dL      Hematocrit 41.3 %      MCV 89.8 fL      MCH 30.7 pg      MCHC 34.1 g/dL      RDW 12.5 %      RDW-SD 40.9 fl       MPV 9.6 fL      Platelets 225 10*3/mm3      Neutrophil % 72.8 %      Lymphocyte % 19.2 %      Monocyte % 5.7 %      Eosinophil % 1.4 %      Basophil % 0.6 %      Immature Grans % 0.3 %      Neutrophils, Absolute 7.60 10*3/mm3      Lymphocytes, Absolute 2.01 10*3/mm3      Monocytes, Absolute 0.60 10*3/mm3      Eosinophils, Absolute 0.15 10*3/mm3      Basophils, Absolute 0.06 10*3/mm3      Immature Grans, Absolute 0.03 10*3/mm3      nRBC 0.0 /100 WBC     Magnesium [[id number]]  (Normal) Collected: 01/06/23 2308    Specimen: Blood Updated: 01/06/23 2344     Magnesium 2.1 mg/dL     Troponin [[id number]]  (Normal) Collected: 01/07/23 0140    Specimen: Blood Updated: 01/07/23 0202     Troponin T <0.010 ng/mL     Narrative:      Troponin T Reference Range:  <= 0.03 ng/mL-   Negative for AMI  >0.03 ng/mL-     Abnormal for myocardial necrosis.  Clinicians would have to utilize clinical acumen, EKG, Troponin and serial changes to determine if it is an Acute Myocardial Infarction or myocardial injury due to an underlying chronic condition.       Results may be falsely decreased if patient taking Biotin.             No radiology results from the last 24 hrs       MDM    Initial impression of presenting illness: Jaw pain, arm pain, right lower abdominal pain    DDX: includes but is not limited to: TMJ, headache, UTI, ACS, anxiety    Patient arrives hemodynamically stable, hypertensive, with vitals interpreted by myself.     Pertinent features from physical exam: Tenderness to palpation to bilateral TMJ.  No neurological deficits noted.    Initial diagnostic plan: IV, Solu-Medrol, Toradol, CBC, CMP, troponin, magnesium, chest x-ray, and EKG.  UA added.    Results from initial plan were reviewed and interpreted by me revealing labs within their normal ranges.  Initial troponin is negative.    Diagnostic information from other sources: Medical record    Interventions / Re-evaluation: Patient received a dose of Solu-Medrol and Toradol  initially.  She said provement in her arm heaviness, and some improvement in her jaw.  Added Norflex.    Results/clinical rationale were discussed with Dr. Vallejo    Consultations/Discussion of results with other physicians: None    Disposition plan: Patient is a 52-year-old female here today for pain to her jaw and bilateral arms.  She was initially hypertensive, but this slowly improved on its own during her visit.  She does report having her blood pressure medication recently increased.  She was found to have normal labs, including 2 negative troponins.  Initially given Solu-Medrol and Toradol, followed by Norflex to help with her symptoms.  She seems to think that the Norflex seem to be the most beneficial.  Because of this, I suspect that her pain is most likely muscular in nature.  Although she was having bilateral jaw pain, she seemed to be experiencing TMJ and I have provided her prescriptions to treat this as the probable cause.  I do recommend her to monitor her symptoms and to follow-up with her primary provider for reevaluation.  She can return to the ER for any new or worsening symptoms.  Patient is agreeable to the plan of care and is ready for discharge  -----    Final diagnoses:   Jaw pain   Pain in both upper extremities        Yoshi Zhang, APRN  01/07/23 0228

## 2023-01-07 NOTE — DISCHARGE INSTRUCTIONS
Your work-up today is normal, including cardiac labs. Your pain seems to be more muscular or joint related since you had improvement with the muscle relaxer. Take the prescribed medication as directed and follow-up with your PCP for a reevaluation.

## 2023-01-09 ENCOUNTER — OFFICE VISIT (OUTPATIENT)
Dept: FAMILY MEDICINE CLINIC | Facility: CLINIC | Age: 53
End: 2023-01-09
Payer: COMMERCIAL

## 2023-01-09 ENCOUNTER — TELEPHONE (OUTPATIENT)
Dept: FAMILY MEDICINE CLINIC | Facility: CLINIC | Age: 53
End: 2023-01-09

## 2023-01-09 VITALS
WEIGHT: 175 LBS | HEART RATE: 58 BPM | OXYGEN SATURATION: 98 % | RESPIRATION RATE: 15 BRPM | HEIGHT: 61 IN | TEMPERATURE: 98 F | BODY MASS INDEX: 33.04 KG/M2 | SYSTOLIC BLOOD PRESSURE: 122 MMHG | DIASTOLIC BLOOD PRESSURE: 74 MMHG

## 2023-01-09 DIAGNOSIS — R07.89 CHEST DISCOMFORT: ICD-10-CM

## 2023-01-09 DIAGNOSIS — Z82.49 FAMILY HISTORY OF HEART DISEASE: ICD-10-CM

## 2023-01-09 DIAGNOSIS — I10 HYPERTENSION, ESSENTIAL: Primary | ICD-10-CM

## 2023-01-09 PROCEDURE — 99215 OFFICE O/P EST HI 40 MIN: CPT | Performed by: FAMILY MEDICINE

## 2023-01-09 RX ORDER — LISINOPRIL 20 MG/1
20 TABLET ORAL DAILY
Qty: 90 TABLET | Refills: 0 | Status: SHIPPED | OUTPATIENT
Start: 2023-01-09

## 2023-01-09 RX ORDER — HYDROCHLOROTHIAZIDE 12.5 MG/1
12.5 TABLET ORAL DAILY
Qty: 90 TABLET | Refills: 0 | Status: SHIPPED | OUTPATIENT
Start: 2023-01-09

## 2023-01-09 NOTE — TELEPHONE ENCOUNTER
Patients  called to get a same day appointment. She saw a different provider last time, and would like her to be seen by PCP. He is very concerned that his wife has been having symptoms of an oncoming heart attack. She has heart issues on both sides of her family and he wants to address this asap. She recently went to the ER with pain in linn jaws, radiating into both arms, and sharp abdomen pain. She is currently having chest discomfort. He says that last night she was struggling to breath. I reiterated the need to go to the ER with these symptoms. He doesn't want to take her back to the [facility]. She hasn't seen a cardiologist in [facility] and he wasn't sure which one it was. I also asked that he call back to check for last minute cancellations today. Please advise.

## 2023-01-10 NOTE — PROGRESS NOTES
Established Patient        Chief Complaint:   Chief Complaint   Patient presents with   • Abdominal Pain   • Jaw Pain        Edyta Franco is a 52 y.o. female    History of Present Illness:   Here today for an emergency room follow-up visit.  She presented to the emergency room after developing acute onset chest pressure/discomfort, radiating to her left jaw and bilateral antecubital areas.  She denies any preceding trauma or injuries otherwise.  Denied any initial onset of shortness of breath, however did become more anxious and panicked as her symptoms persisted.  She described a sensation of eventually myalgia, no overlying rashes or other abnormalities.  Denies epistaxis or hemoptysis.  She denies aspiration/dysphagia.  Denies BRB/BTS.    Despite no acute cardiac abnormalities identified in the emergency room evaluation, she continues to have symptoms, although to a milder degree.  She does report recently undergoing an increase in the dosing of her nebivolol as a result of variability to her blood pressure control.     I have reviewed labs/imaging/records from this most recent ER visit; staff and admitting/attending physicians H/P's and progress notes to establish a comprehensive understanding of this patient's clinical hospital course, as well as to establish a transition of care appropriately.      Subjective     The following portions of the patient's history were reviewed and updated as appropriate: allergies, current medications, past family history, past medical history, past social history, past surgical history and problem list.    Allergies   Allergen Reactions   • Penicillins Hives       Review of Systems  1. Constitutional: Negative for fever. Negative for chills, diaphoresis; malaise/fatigue of mild nature.  2. HENT: No dysphagia; no changes to vision/hearing/smell/taste; no epistaxis  3. Eyes: Negative for redness and visual disturbance.   4. Respiratory: negative for  shortness of breath. Negative for chest pain . Negative for cough and chest tightness.   5. Cardiovascular: As per above.  6. Gastrointestinal: Negative for abdominal distention, abdominal pain and blood in stool.   7. Endocrine: Negative for cold intolerance and heat intolerance.   8. Genitourinary: Negative for difficulty urinating, dysuria and frequency.   9. Musculoskeletal: As per above, arthralgias, without back pain and myalgias.   10. Skin: Negative for color change, rash and wound.   11. Neurological: Negative for syncope, weakness and headaches.   12. Hematological: Negative for adenopathy. Does not bruise/bleed easily.   13. Psychiatric/Behavioral: Negative for confusion. The patient is not nervous/anxious.    Objective     Physical Exam   Vital Signs: /74   Pulse 58   Temp 98 °F (36.7 °C)   Resp 15   Ht 154.9 cm (61\")   Wt 79.4 kg (175 lb)   LMP  (LMP Unknown)   SpO2 98%   BMI 33.07 kg/m²   BMI is >= 30 and <35. (Class 1 Obesity). The following options were offered after discussion;: none (medical contraindication)    General Appearance: alert, oriented x 3, no acute distress.  Skin: warm and dry.   HEENT: Atraumatic.  pupils round and reactive to light and accommodation, oral mucosa pink and moist.  Nares patent without epistaxis.  External auditory canals are patent tympanic membranes intact.  Neck: supple, no JVD, trachea midline.  No thyromegaly  Lungs: CTA, unlabored breathing effort.  Heart: RRR, normal S1 and S2, no S3, no rub.  Abdomen: soft, non-tender, no palpable bladder, present bowel sounds to auscultation ×4.  No guarding or rigidity.  Extremities: no clubbing, cyanosis or edema.  Good range of motion actively and passively.  Symmetric muscle strength and development  Neuro: normal speech and mental status.  Cranial nerves II through XII intact.  No anosmia. DTR 2+; proprioception intact.  No focal motor/sensory deficits.    Assessment and Plan      Assessment/Plan:    Diagnoses and all orders for this visit:    1. Hypertension, essential (Primary)  -     lisinopril (PRINIVIL,ZESTRIL) 20 MG tablet; Take 1 tablet by mouth Daily.  Dispense: 90 tablet; Refill: 0  -     hydroCHLOROthiazide (HYDRODIURIL) 12.5 MG tablet; Take 1 tablet by mouth Daily.  Dispense: 90 tablet; Refill: 0  -     Ambulatory Referral to Cardiology    2. Chest discomfort  -     Ambulatory Referral to Cardiology    3. Family history of heart disease  -     Ambulatory Referral to Cardiology    Have recommended discontinuation of nebivolol.  Utilization of dual modality antihypertensive regimen, lisinopril and hydrochlorothiazide.  I have asked that patient routinely monitor blood pressure while at home.  Notify the office should she develop any ill effects to the above medications.  I am hopeful that dual modalities of therapy will aid in less variability to her blood pressure control.    I do suspect that the nebivolol dosing adjustment recently has contributed to the development of her symptoms.  Plan to follow clinically, however given her family history and symptoms, I would recommend restratification with a cardiology referral.    I would prefer her heart rate remain above 60, lessening the likelihood of symptomatic bradycardia.  Would also recommend her systolic blood pressure remain above 65 if able.        Discussion Summary:  I spent 40 minutes caring for Edyta on this date of service. This time includes time spent by me in the following activities:preparing for the visit, performing a medically appropriate examination and/or evaluation , counseling and educating the patient/family/caregiver, ordering medications, tests, or procedures, referring and communicating with other health care professionals , documenting information in the medical record and care coordination    Discussed plan of care in detail with pt, and her , today; they verb understanding and agree.  Follow up:  No follow-ups on  file.     There are no Patient Instructions on file for this visit.    Bill Albrecht,   01/09/23  21:24 EST

## 2023-01-11 DIAGNOSIS — I10 HYPERTENSION, ESSENTIAL, BENIGN: ICD-10-CM

## 2023-01-11 DIAGNOSIS — I87.2 VENOUS INSUFFICIENCY OF BOTH LOWER EXTREMITIES: ICD-10-CM

## 2023-01-11 RX ORDER — TORSEMIDE 10 MG/1
TABLET ORAL
Qty: 90 TABLET | Refills: 0 | Status: SHIPPED | OUTPATIENT
Start: 2023-01-11

## 2023-01-12 RX ORDER — ERGOCALCIFEROL 1.25 MG/1
50000 CAPSULE ORAL WEEKLY
Qty: 5 CAPSULE | Refills: 0 | Status: SHIPPED | OUTPATIENT
Start: 2023-01-12 | End: 2023-03-29

## 2023-01-24 ENCOUNTER — OFFICE VISIT (OUTPATIENT)
Dept: FAMILY MEDICINE CLINIC | Facility: CLINIC | Age: 53
End: 2023-01-24
Payer: COMMERCIAL

## 2023-01-24 VITALS
RESPIRATION RATE: 16 BRPM | HEART RATE: 71 BPM | SYSTOLIC BLOOD PRESSURE: 120 MMHG | HEIGHT: 61 IN | WEIGHT: 172 LBS | DIASTOLIC BLOOD PRESSURE: 62 MMHG | TEMPERATURE: 98 F | OXYGEN SATURATION: 95 % | BODY MASS INDEX: 32.47 KG/M2

## 2023-01-24 DIAGNOSIS — I87.2 VENOUS INSUFFICIENCY OF BOTH LOWER EXTREMITIES: ICD-10-CM

## 2023-01-24 DIAGNOSIS — I10 HYPERTENSION, ESSENTIAL: Primary | ICD-10-CM

## 2023-01-24 PROCEDURE — 99213 OFFICE O/P EST LOW 20 MIN: CPT | Performed by: FAMILY MEDICINE

## 2023-01-24 NOTE — PROGRESS NOTES
Established Patient        Chief Complaint:   Chief Complaint   Patient presents with   • Hypertension        Edyta Franco is a 52 y.o. female    History of Present Illness:   Here today in scheduled follow-up visit of her hypertension and venous insufficiency of the lower extremities.    She denies any problems with her current medication regimen.  She states that her blood pressure control has been consistent and normal on the home blood pressure monitoring.  She denies fever, chills or night sweats.  Denies any epistaxis or hemoptysis.  Denies orthostasis symptoms.    She reports balanced dietary intake and appropriate hydration.    Subjective     The following portions of the patient's history were reviewed and updated as appropriate: allergies, current medications, past family history, past medical history, past social history, past surgical history and problem list.    Allergies   Allergen Reactions   • Penicillins Hives       Review of Systems  1. Constitutional: Negative for fever. Negative for chills, diaphoresis, fatigue and unexpected weight change.   2. HENT: No dysphagia; no changes to vision/hearing/smell/taste; no epistaxis  3. Eyes: Negative for redness and visual disturbance.   4. Respiratory: negative for shortness of breath. Negative for chest pain . Negative for cough and chest tightness.   5. Cardiovascular: Negative for chest pain and palpitations.   6. Gastrointestinal: Negative for abdominal distention, abdominal pain and blood in stool.   7. Endocrine: Negative for cold intolerance and heat intolerance.   8. Genitourinary: Negative for difficulty urinating, dysuria and frequency.   9. Musculoskeletal: Negative for arthralgias, back pain and myalgias.   10. Skin: Negative for color change, rash and wound.   11. Neurological: Negative for syncope, weakness and headaches.   12. Hematological: Negative for adenopathy. Does not bruise/bleed easily.  "  13. Psychiatric/Behavioral: Negative for confusion. The patient is not nervous/anxious.    Objective     Physical Exam   Vital Signs: /62   Pulse 71   Temp 98 °F (36.7 °C)   Resp 16   Ht 154.9 cm (61\")   Wt 78 kg (172 lb)   LMP  (LMP Unknown)   SpO2 95%   BMI 32.50 kg/m²   BMI is >= 30 and <35. (Class 1 Obesity). The following options were offered after discussion;: weight loss educational material (shared in after visit summary), exercise counseling/recommendations and nutrition counseling/recommendations    General Appearance: alert, oriented x 3, no acute distress.  Skin: warm and dry.   HEENT: Atraumatic.  pupils round and reactive to light and accommodation, oral mucosa pink and moist.  Nares patent without epistaxis.  External auditory canals are patent tympanic membranes intact.  Neck: supple, no JVD, trachea midline.  No thyromegaly  Lungs: CTA, unlabored breathing effort.  Heart: RRR, normal S1 and S2, no S3, no rub.  Abdomen: soft, non-tender, no palpable bladder, present bowel sounds to auscultation ×4.  No guarding or rigidity.  Extremities: no clubbing, cyanosis or edema.  Good range of motion actively and passively.  Symmetric muscle strength and development  Neuro: normal speech and mental status.  Cranial nerves II through XII intact.  No anosmia. DTR 2+; proprioception intact.  No focal motor/sensory deficits.    Advance Care Planning   ACP discussion was held with the patient during this visit. Patient does not have an advance directive, information provided.       Assessment and Plan      Assessment/Plan:   Diagnoses and all orders for this visit:    1. Hypertension, essential (Primary)    2. Venous insufficiency of both lower extremities      Vital signs demonstrate hemodynamic stability, blood pressure markedly improved.  Has been consistent and stable on home blood pressure monitoring additionally.  Continue current treatment regimen, no dosing adjustment needed at this time.  " Continue home blood pressure monitoring routinely.    Continue low-sodium/salt dietary intake.    Still awaiting scheduling for cardiology referral/stress testing.          Discussion Summary:    Discussed plan of care in detail with pt today; pt verb understanding and agrees.    I spent 25 minutes caring for Edyta on this date of service. This time includes time spent by me in the following activities:preparing for the visit, performing a medically appropriate examination and/or evaluation , counseling and educating the patient/family/caregiver, ordering medications, tests, or procedures, documenting information in the medical record and care coordination    I have reviewed and updated all copied forward information, as appropriate.  I attest to the accuracy and relevance of any unchanged information.    Follow up:  Return in about 3 months (around 4/24/2023) for Recheck.     There are no Patient Instructions on file for this visit.    Bill Albrecht DO  01/25/23  08:31 EST

## 2023-03-17 DIAGNOSIS — E78.01 FAMILIAL HYPERCHOLESTEROLEMIA: ICD-10-CM

## 2023-03-17 DIAGNOSIS — E78.2 MIXED DYSLIPIDEMIA: ICD-10-CM

## 2023-03-20 RX ORDER — ROSUVASTATIN CALCIUM 40 MG/1
TABLET, COATED ORAL
Qty: 90 TABLET | Refills: 0 | Status: SHIPPED | OUTPATIENT
Start: 2023-03-20

## 2023-03-20 RX ORDER — FENOFIBRATE 54 MG/1
TABLET ORAL
Qty: 90 TABLET | Refills: 0 | Status: SHIPPED | OUTPATIENT
Start: 2023-03-20

## 2023-03-29 ENCOUNTER — OFFICE VISIT (OUTPATIENT)
Dept: GYNECOLOGIC ONCOLOGY | Facility: CLINIC | Age: 53
End: 2023-03-29
Payer: COMMERCIAL

## 2023-03-29 VITALS
HEIGHT: 61 IN | RESPIRATION RATE: 18 BRPM | BODY MASS INDEX: 33.25 KG/M2 | SYSTOLIC BLOOD PRESSURE: 131 MMHG | HEART RATE: 80 BPM | OXYGEN SATURATION: 97 % | TEMPERATURE: 97.1 F | DIASTOLIC BLOOD PRESSURE: 81 MMHG | WEIGHT: 176.1 LBS

## 2023-03-29 DIAGNOSIS — L90.0 LICHEN SCLEROSUS ET ATROPHICUS: ICD-10-CM

## 2023-03-29 DIAGNOSIS — C51.9 VULVAR CANCER, CARCINOMA: Primary | ICD-10-CM

## 2023-03-29 DIAGNOSIS — L29.2 VULVAR PRURITUS: ICD-10-CM

## 2023-03-29 PROCEDURE — 99213 OFFICE O/P EST LOW 20 MIN: CPT | Performed by: OBSTETRICS & GYNECOLOGY

## 2023-03-29 NOTE — PROGRESS NOTES
Edyta Franco  [id number]  1970      Reason for visit:  History of vulvar cancer, concern for new lesions    History of present illness:  The patient is a 52 y.o. year old female who presents today for treatment and evaluation of the above issues.    Today, patient continues to have irritation at the vulvovaginal area. Some days better than others.  Today is a better day.  She had spotting yesterday.  She states that the pain is often severe that she has difficulty urinating. She has had difficulty tolerating her clobetasol due to skin thinning from continued topical. She only took the compound topical medication, prescribed at last visit, of ketamine+lidocaine for one-two doses, because she could not tolerate it due to burning.  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  She and her  have bought a property with a 200-year-old [place] and they are starting renovations.    She is looking forward to this summer, as she is retiring from teaching and plans to start renovations on the [address]-year [place]farm house that they have recently purchased.     Oncologic History:  Oncology/Hematology History   Vulvar cancer, carcinoma   8/14/2017 Biopsy    History of lichens sclerosus, on clobetasol therapy. Pt presented to Dr. Abigail Fong with c/o painful ulcerative left vulvar lesion. Biopsy revealed invasive SCC, extending into deep margin at least 3.5 mm. Referred to Gyn Oncology     8/30/2017 Surgery    Modified radical vulvectomy, left inguinofemoral sentinel lymph node dissection, right inguinofemoral lymph node dissection, and mapping biopsies of the vulva. Surgery by Dr. Nassar and Dr. Bree Russell.     Pathology revealed SCC, focally keratinizing with 0.3 cm invasion. LVSI negative, lymph nodes negative, mapping biopsies negative. Stage IB grade 2       9/24/2017 - 9/27/2017 Other Event    Inpatient hospital admission for inguinal incision cellulitis,  inguinal fluid collection, and SIRS. Treated with IV antibiotics and sent home on oral antibiotics.      10/26/2017 - 10/27/2017 Other Event    2 day inpatient admission for recurrent cellulitis, resolved with antibiotic management           Past Medical History:   Diagnosis Date   • Epilepsy 2020    Patient reported she was taken off her medication for this apx 10 years ago.  Reported no seizure activity for apx. 8 years ago.     • Fatigue    • Hypercholesteremia    • Hypertension    • Lichen sclerosus of female genitalia    • Rectal bleeding 2020   • Vulva cancer    • Wears contact lenses 2020    Patient advised contacts will need to be removed DOS       Past Surgical History:   Procedure Laterality Date   •  SECTION     • COLONOSCOPY N/A 2020    Procedure: COLONOSCOPY WITH BIOPSY;  Surgeon: Eleni Russell MD;  Location: [facility] ENDOSCOPY;  Service: Gastroenterology;  Laterality: N/A;   • HAND SURGERY Right    • LUMBAR LAMINECTOMY  2011    HEMILAMI RT-C7-T1, RT C7-T1 MEDIAL FACET & CT-T1 DISC (Dr. Cabrera   • ORIF ANKLE FRACTURE Right    • TOTAL ABDOMINAL HYSTERECTOMY WITH SALPINGO OOPHORECTOMY      Dr. Richard Armendariz - [place] , [place]    • VULVECTOMY N/A 2017    Procedure: MODIFIED RADICAL VULVECTOMY, BILATERAL SENTINAL LYMPH NODE DISSECTION, MAPPING BIOPSIES OF VULVA;  Surgeon: Isabela Nassar MD;  Location: [facility] OR;  Service:        MEDICATIONS: The current medication list was reviewed with the patient and updated in the EMR this date per the Medical Assistant. Medication dosages and frequencies were confirmed to be accurate.      Allergies:  is allergic to penicillins.    Social History:   Social History     Socioeconomic History   • Marital status:    • Number of children: 2   Tobacco Use   • Smoking status: Never     Passive exposure: Never   • Smokeless tobacco: Never   Vaping Use   • Vaping Use: Never used   Substance and Sexual Activity   •  "Alcohol use: Yes     Comment: 2x month   • Drug use: Never   • Sexual activity: Yes     Partners: Male     Birth control/protection: Surgical       Family History:    Family History   Problem Relation Age of Onset   • Diabetes type II Father    • Congenital heart disease Father    • Hypertension Father    • Hyperlipidemia Father    • Diabetes type II Mother    • Congenital heart disease Mother    • Hypertension Mother    • Stroke Mother    • Hyperlipidemia Mother    • Heart attack Other    • Hyperlipidemia Other    • Breast cancer Neg Hx    • Colon cancer Neg Hx    • Uterine cancer Neg Hx    • Ovarian cancer Neg Hx        Health Maintenance:    Health Maintenance   Topic Date Due   • DXA SCAN  Never done   • COVID-19 Vaccine (1) Never done   • HEPATITIS C SCREENING  Never done   • ZOSTER VACCINE (1 of 2) Never done   • ANNUAL PHYSICAL  02/25/2021   • INFLUENZA VACCINE  08/01/2023   • LIPID PANEL  12/30/2023   • PAP SMEAR  05/25/2024   • MAMMOGRAM  06/22/2024   • TDAP/TD VACCINES (2 - Td or Tdap) 03/01/2030   • COLORECTAL CANCER SCREENING  03/23/2030   • Pneumococcal Vaccine 0-64  Aged Out     Review of Systems  Please refer to HPI, remainder of ROS negative.    Physical Exam    Vitals:    03/29/23 1536   BP: 131/81   Pulse: 80   Resp: 18   Temp: 97.1 °F (36.2 °C)   TempSrc: Temporal   SpO2: 97%   Weight: 79.9 kg (176 lb 1.6 oz)   Height: 154.9 cm (61\")   PainSc:   6   PainLoc: Groin       Body mass index is 33.27 kg/m².  Wt Readings from Last 3 Encounters:   03/29/23 79.9 kg (176 lb 1.6 oz)   01/24/23 78 kg (172 lb)   01/09/23 79.4 kg (175 lb)     GENERAL: Alert, well-appearing female appearing her stated age who is in no apparent distress.    HEENT: Sclera anicteric. Head normocephalic, atraumatic.  CARDIOVASCULAR:  no peripheral edema.  RESPIRATORY:normal respiratory effort  SKIN:  Warm, dry, well-perfused.  All visible areas intact.  No rashes, lesions, ulcers.  PSYCHIATRIC: AO x3, with appropriate affect, normal " thought processes.  NEUROLOGIC: No focal deficits.  Moves extremities well.  MUSCULOSKELETAL: Normal gait and station.   EXTREMITIES:   No cyanosis, clubbing, symmetric.     PELVIC exam:    External genitalia consistent with diffuse lichen sclerosis.  Thickened area at base of left labia minora and anterior vaginal introitus, stable.  Possible minimal response to steroid injection.   at right introitus.    ECOG PS 0    PROCEDURES: None    Diagnostic Data:      Lab Results   Component Value Date    WBC 10.45 01/06/2023    HGB 14.1 01/06/2023    HCT 41.3 01/06/2023    MCV 89.8 01/06/2023     01/06/2023    NEUTROABS 7.60 (H) 01/06/2023    GLUCOSE 169 (H) 01/06/2023    BUN 13 01/06/2023    CREATININE 0.75 01/06/2023    EGFRIFNONA 77 10/18/2021    EGFRIFAFRI 84 10/28/2020     01/06/2023    K 4.0 01/06/2023     01/06/2023    CO2 24.7 01/06/2023    MG 2.1 01/06/2023    CALCIUM 9.4 01/06/2023    ALBUMIN 4.3 01/06/2023    AST 18 01/06/2023    ALT 19 01/06/2023    BILITOT 0.2 01/06/2023     No results found for:       Assessment & Plan   This is a 52 y.o. woman with history of vulvar cancer, diffuse lichen sclerosis.    Encounter Diagnoses   Name Primary?   • Vulvar cancer, carcinoma Yes   • Vulvar pruritus    • Lichen sclerosus et atrophicus      -Status post intradermal steroid injection without significant improvement.  -At last visit, recommended topical compound including ketamine and lidocaine, but patient did not take it because of increased skin burning.   -We discussed that surgery (procedure entailing vaginal/vulvar biopsy + laser ablation) would be a next step.  -She desires to defer surgery at this time until symptoms become extremely debilitating. She is able to manage currently with the good and bad days off and on.     Pain assessment was performed today as a part of patient’s care.  For patients with pain related to surgery, gynecologic malignancy or cancer treatment, the  plan is as noted in the assessment/plan.  For patients with pain not related to these issues, they are to seek any further needed care from a more appropriate provider, such as PCP.      No orders of the defined types were placed in this encounter.    FOLLOW UP: 6 months    Diana Booth MD   Resident Physician, PGY 3  Gynecologic Oncology     I spent 20 minutes caring for Edyta on this date of service. This time includes time spent by me in the following activities: preparing for the visit, reviewing tests, performing a medically appropriate examination and/or evaluation, counseling and educating the patient/family/caregiver, ordering medications, tests, or procedures and documenting information in the medical record    I saw and evaluated the patient. I agree with the findings and the plan of care as documented in the note.    Isabela Nassar MD  04/02/23  10:37 EDT

## 2023-04-13 PROCEDURE — U0004 COV-19 TEST NON-CDC HGH THRU: HCPCS | Performed by: FAMILY MEDICINE

## 2023-05-07 DIAGNOSIS — K21.9 GASTROESOPHAGEAL REFLUX DISEASE, UNSPECIFIED WHETHER ESOPHAGITIS PRESENT: ICD-10-CM

## 2023-05-10 RX ORDER — OMEPRAZOLE 40 MG/1
40 CAPSULE, DELAYED RELEASE ORAL DAILY
Qty: 90 CAPSULE | Refills: 0 | Status: SHIPPED | OUTPATIENT
Start: 2023-05-10

## 2023-06-02 ENCOUNTER — TELEPHONE (OUTPATIENT)
Dept: GYNECOLOGIC ONCOLOGY | Facility: CLINIC | Age: 53
End: 2023-06-02

## 2023-06-02 DIAGNOSIS — I87.2 VENOUS INSUFFICIENCY OF BOTH LOWER EXTREMITIES: ICD-10-CM

## 2023-06-02 DIAGNOSIS — I10 HYPERTENSION, ESSENTIAL: ICD-10-CM

## 2023-06-02 DIAGNOSIS — I10 HYPERTENSION, ESSENTIAL, BENIGN: ICD-10-CM

## 2023-06-02 RX ORDER — TORSEMIDE 10 MG/1
TABLET ORAL
Qty: 90 TABLET | Refills: 0 | Status: SHIPPED | OUTPATIENT
Start: 2023-06-02

## 2023-06-02 RX ORDER — HYDROCHLOROTHIAZIDE 12.5 MG/1
TABLET ORAL
Qty: 90 TABLET | Refills: 0 | Status: SHIPPED | OUTPATIENT
Start: 2023-06-02

## 2023-06-02 RX ORDER — LISINOPRIL 20 MG/1
TABLET ORAL
Qty: 90 TABLET | Refills: 0 | Status: SHIPPED | OUTPATIENT
Start: 2023-06-02

## 2023-06-02 NOTE — TELEPHONE ENCOUNTER
Today 06/02/2023 I faxed the last year of PTs medical records to the fax number provided in the document([phone]) at 11:45am    I received a successful fax confirmation sheet @ 12pm    Placed the document and fax confirmation sheet in the to be scanned folder. So it will be uploaded into PTs chart

## 2023-06-02 NOTE — TELEPHONE ENCOUNTER
On Thursday 06/01/2023, I received documents requesting a copy off PTs medical records for the past year.   Will print of medical records and fax to number provided on documents.

## 2023-06-05 RX ORDER — ESTRADIOL 0.1 MG/G
CREAM VAGINAL
Qty: 43 G | Refills: 0 | OUTPATIENT
Start: 2023-06-05

## 2023-06-06 ENCOUNTER — OFFICE VISIT (OUTPATIENT)
Dept: OBSTETRICS AND GYNECOLOGY | Facility: CLINIC | Age: 53
End: 2023-06-06
Payer: COMMERCIAL

## 2023-06-06 VITALS
HEIGHT: 61 IN | WEIGHT: 167.8 LBS | DIASTOLIC BLOOD PRESSURE: 86 MMHG | BODY MASS INDEX: 31.68 KG/M2 | SYSTOLIC BLOOD PRESSURE: 130 MMHG

## 2023-06-06 DIAGNOSIS — Z01.419 WOMEN'S ANNUAL ROUTINE GYNECOLOGICAL EXAMINATION: ICD-10-CM

## 2023-06-06 DIAGNOSIS — L90.0 LICHEN SCLEROSUS ET ATROPHICUS: Primary | ICD-10-CM

## 2023-06-06 DIAGNOSIS — C51.9 VULVAR CANCER, CARCINOMA: ICD-10-CM

## 2023-06-06 DIAGNOSIS — Z78.0 POSTMENOPAUSE: Primary | ICD-10-CM

## 2023-06-06 RX ORDER — ESTRADIOL 0.5 MG/1
0.5 TABLET ORAL DAILY
Qty: 90 TABLET | Refills: 3 | Status: SHIPPED | OUTPATIENT
Start: 2023-06-06

## 2023-06-06 RX ORDER — ESTRADIOL 0.1 MG/G
1 CREAM VAGINAL DAILY
Qty: 1 EACH | Refills: 12 | Status: SHIPPED | OUTPATIENT
Start: 2023-06-06

## 2023-06-06 NOTE — PROGRESS NOTES
Gynecologic Annual Exam Note        GYN Annual Exam     CC - Here for annual exam.        HPI  Edyta Franco is a 52 y.o. female, , who presents for annual well woman exam as a established patient.  She is s/p AGUSTÍN/BSO in  and vulvectomy by Dr. Nassar and Dr. Russell in 2017 for vulvar cancer. Denies vaginal bleeding.  Patient reports problems with: vaginal dryness and tearing from LS . There were no changes to her medical or surgical history since her last visit.. Partner Status: Marital Status: .  She is is sexually active. She has not had new partners.. STD testing recommendations have been explained to the patient and she does not desire STD testing.    She plans to resume pelvic floor PT soon as her therapist has been out of the office.      She can no longer use clobetasol, which worked well for her LS because it has thinned her skin so much.  She has substantial pain sitting and when she starts and ends her urine stream.  Dr. Nassar prescribed a compounded cream within the last 6 months that provided no relief and patient reports she has had multiple biopsies.     Additional OB/GYN History   On HRT? Estradiol 0.5mg    Last Pap : . Results: negative. HPV: not done.   Last Completed Pap Smear            PAP SMEAR (Every 3 Years) Next due on 2021  SCANNED - PAP SMEAR    11/15/2019  SCANNED - PAP SMEAR    2017  Done                  Family history of uterine, colon, breast, or ovarian cancer: yes - pt's mother had ovarian cancer  Performs monthly Self-Breast Exam: yes  Last mammogram: . Done at [facility].    Last Completed Mammogram            Ordered - MAMMOGRAM (Every 2 Years) Ordered on 2022  Mammo Screening Digital Tomosynthesis Bilateral With CAD    2019  Mammo Screening Digital Tomosynthesis Bilateral With CAD                  Last colonoscopy: has had a colonoscopy 3 year(s) ago.    Last Completed Colonoscopy             COLORECTAL CANCER SCREENING (COLONOSCOPY - Every 10 Years) Next due on 3/23/2030      2020  Surgical Procedure: COLONOSCOPY    2020  COLONOSCOPY                      Last bone density scan (DEXA): never, scheduled today.   Exercises Regularly: walking regularly.   Feelings of Anxiety or Depression: no      Tobacco Usage?: No       Current Outpatient Medications:   •  estradiol (ESTRACE) 0.5 MG tablet, Take 1 tablet by mouth Daily., Disp: 90 tablet, Rfl: 3  •  fenofibrate (TRICOR) 54 MG tablet, Take 1 tablet by mouth once daily, Disp: 90 tablet, Rfl: 0  •  hydroCHLOROthiazide (HYDRODIURIL) 12.5 MG tablet, Take 1 tablet by mouth once daily, Disp: 90 tablet, Rfl: 0  •  lisinopril (PRINIVIL,ZESTRIL) 20 MG tablet, Take 1 tablet by mouth once daily, Disp: 90 tablet, Rfl: 0  •  omeprazole (priLOSEC) 40 MG capsule, Take 1 capsule by mouth once daily, Disp: 90 capsule, Rfl: 0  •  rosuvastatin (CRESTOR) 40 MG tablet, TAKE 1 TABLET BY MOUTH AT NIGHT, Disp: 90 tablet, Rfl: 0  •  torsemide (DEMADEX) 10 MG tablet, Take 1 tablet by mouth once daily, Disp: 90 tablet, Rfl: 0  •  estradiol (ESTRACE VAGINAL) 0.1 MG/GM vaginal cream, Insert 1 g into the vagina Daily., Disp: 1 each, Rfl: 12    Patient is requesting refills of Estradiol and Estrace.    OB History          2    Para   2    Term   2            AB        Living   2         SAB        IAB        Ectopic        Molar        Multiple        Live Births   2                Past Medical History:   Diagnosis Date   • Epilepsy 2020    Patient reported she was taken off her medication for this apx 10 years ago.  Reported no seizure activity for apx. 8 years ago.     • Fatigue    • Hypercholesteremia    • Hypertension    • Lichen sclerosus of female genitalia    • Rectal bleeding 2020   • Vulva cancer    • Wears contact lenses 2020    Patient advised contacts will need to be removed DOS        Past Surgical History:   Procedure  Laterality Date   •  SECTION     • COLONOSCOPY N/A 2020    Procedure: COLONOSCOPY WITH BIOPSY;  Surgeon: Eleni Russell MD;  Location: [facility] ENDOSCOPY;  Service: Gastroenterology;  Laterality: N/A;   • HAND SURGERY Right    • LUMBAR LAMINECTOMY  2011    HEMILAMI RT-C7-T1, RT C7-T1 MEDIAL FACET & CT-T1 DISC (Dr. Cabrera   • ORIF ANKLE FRACTURE Right    • TOTAL ABDOMINAL HYSTERECTOMY WITH SALPINGO OOPHORECTOMY      Dr. Richard Armendariz - [facility]    • VULVECTOMY N/A 2017    Procedure: MODIFIED RADICAL VULVECTOMY, BILATERAL SENTINAL LYMPH NODE DISSECTION, MAPPING BIOPSIES OF VULVA;  Surgeon: Isabela Nassar MD;  Location: [facility] OR;  Service:        Health Maintenance   Topic Date Due   • DXA SCAN  Never done   • Pneumococcal Vaccine 0-64 (1 - PCV) Never done   • HEPATITIS C SCREENING  Never done   • ZOSTER VACCINE (1 of 2) Never done   • ANNUAL PHYSICAL  2021   • COVID-19 Vaccine (3 - Moderna series) 10/25/2021   • Annual Gynecologic Pelvic and Breast Exam  2023   • INFLUENZA VACCINE  2023   • LIPID PANEL  2023   • PAP SMEAR  2024   • MAMMOGRAM  2024   • TDAP/TD VACCINES (2 - Td or Tdap) 2030   • COLORECTAL CANCER SCREENING  2030       The additional following portions of the patient's history were reviewed and updated as appropriate: allergies, current medications, past family history, past medical history, past social history, past surgical history, and problem list.    Review of Systems   Constitutional:  Negative for chills, fatigue, unexpected weight gain and unexpected weight loss.   HENT:  Negative for sore throat and swollen glands.    Respiratory:  Negative for cough and shortness of breath.    Cardiovascular:  Negative for chest pain, palpitations and leg swelling.   Gastrointestinal:  Negative for abdominal distention, abdominal pain, blood in stool, constipation, diarrhea and nausea.   Endocrine: Negative for cold  "intolerance and heat intolerance.   Genitourinary:  Negative for breast discharge, breast lump, frequency, hematuria, pelvic pain, vaginal bleeding, vaginal discharge and vaginal pain.   Musculoskeletal:  Negative for gait problem and myalgias.   Allergic/Immunologic: Negative for immunocompromised state.   Neurological:  Negative for dizziness, headache and confusion.   Psychiatric/Behavioral:  Negative for suicidal ideas and depressed mood.      I have reviewed and agree with the HPI, ROS, and historical information as entered above.Abigail Fong MD       Objective   /86   Ht 154.9 cm (61\")   Wt 76.1 kg (167 lb 12.8 oz)   LMP  (LMP Unknown)   BMI 31.71 kg/m²     Physical Exam  Vitals and nursing note reviewed. Exam conducted with a chaperone present.   Constitutional:       Appearance: Normal appearance. She is well-developed.   HENT:      Head: Normocephalic and atraumatic.   Eyes:      Pupils: Pupils are equal, round, and reactive to light.   Pulmonary:      Effort: Pulmonary effort is normal.   Chest:   Breasts:     Right: No mass, nipple discharge or skin change.      Left: No mass, nipple discharge or skin change.   Abdominal:      General: There is no distension.      Palpations: Abdomen is soft.      Tenderness: There is no abdominal tenderness. There is no guarding.   Genitourinary:     General: Normal vulva.      Exam position: Lithotomy position.      Vagina: Normal. No vaginal discharge, bleeding or lesions.      Adnexa:         Right: No tenderness.          Left: No tenderness.        Rectum: Normal.   Musculoskeletal:         General: Normal range of motion.      Cervical back: Normal range of motion and neck supple.      Right lower leg: No edema.      Left lower leg: No edema.   Skin:     General: Skin is warm and dry.   Neurological:      Mental Status: She is alert and oriented to person, place, and time.   Psychiatric:         Behavior: Behavior normal.     +lichen sclerosis   "   Assessment and Plan    Problem List Items Addressed This Visit          Gynecologic and Obstetric Problems    Vulvar cancer, carcinoma       Other    Lichen sclerosus et atrophicus - Primary    Relevant Orders    Mammo Screening Digital Tomosynthesis Bilateral With CAD     Other Visit Diagnoses       Women's annual routine gynecological examination        Relevant Orders    Mammo Screening Digital Tomosynthesis Bilateral With CAD            GYN annual well woman exam.   Reviewed monthly self breast exams.  Instructed to call with lumps, pain, or breast discharge.  Yearly mammograms ordered.  Ordered mammogram today.  Osteoporosis screening ordered today.  Reviewed BMI and weight loss as preventative health measures.   RTC in 1 year or PRN with problems.  Other: per patient alex has tried compounded creams, and steroid inj, but she is still v uncomfortable with her lichen. Will refer to derm for hopefully some alternatives for management. She has had to stop the clobetesol due to skin thinning and breakdown.   No follow-ups on file.   BDS today    Abigail Fong MD  06/06/2023

## 2023-08-02 DIAGNOSIS — E78.01 FAMILIAL HYPERCHOLESTEROLEMIA: ICD-10-CM

## 2023-08-02 DIAGNOSIS — E78.2 MIXED DYSLIPIDEMIA: ICD-10-CM

## 2023-08-02 RX ORDER — ROSUVASTATIN CALCIUM 40 MG/1
TABLET, COATED ORAL
Qty: 90 TABLET | Refills: 0 | OUTPATIENT
Start: 2023-08-02

## 2023-08-02 RX ORDER — FENOFIBRATE 54 MG/1
TABLET ORAL
Qty: 90 TABLET | Refills: 0 | OUTPATIENT
Start: 2023-08-02

## 2023-08-17 ENCOUNTER — OFFICE VISIT (OUTPATIENT)
Dept: FAMILY MEDICINE CLINIC | Facility: CLINIC | Age: 53
End: 2023-08-17
Payer: COMMERCIAL

## 2023-08-17 VITALS
BODY MASS INDEX: 28.51 KG/M2 | TEMPERATURE: 98 F | RESPIRATION RATE: 18 BRPM | WEIGHT: 151 LBS | DIASTOLIC BLOOD PRESSURE: 84 MMHG | HEART RATE: 64 BPM | OXYGEN SATURATION: 98 % | SYSTOLIC BLOOD PRESSURE: 120 MMHG | HEIGHT: 61 IN

## 2023-08-17 DIAGNOSIS — E78.01 FAMILIAL HYPERCHOLESTEROLEMIA: ICD-10-CM

## 2023-08-17 DIAGNOSIS — Z13.6 SCREENING FOR CARDIOVASCULAR CONDITION: ICD-10-CM

## 2023-08-17 DIAGNOSIS — R53.81 MALAISE AND FATIGUE: ICD-10-CM

## 2023-08-17 DIAGNOSIS — R53.83 MALAISE AND FATIGUE: ICD-10-CM

## 2023-08-17 DIAGNOSIS — I10 HYPERTENSION, ESSENTIAL: Primary | ICD-10-CM

## 2023-08-17 PROCEDURE — 99214 OFFICE O/P EST MOD 30 MIN: CPT | Performed by: FAMILY MEDICINE

## 2023-08-17 RX ORDER — HYDROXYCHLOROQUINE SULFATE 200 MG/1
TABLET, FILM COATED ORAL
COMMUNITY
Start: 2023-06-16

## 2023-08-17 RX ORDER — NEBIVOLOL 10 MG/1
10 TABLET ORAL DAILY
Qty: 90 TABLET | Refills: 2 | Status: SHIPPED | OUTPATIENT
Start: 2023-08-17

## 2023-08-17 NOTE — PROGRESS NOTES
Established Patient        Chief Complaint:   Chief Complaint   Patient presents with    Hypertension    Follow-up     Discuss medications    Cough    Nasal Congestion        Edyta Franco is a 53 y.o. female    History of Present Illness:   Answers submitted by the patient for this visit:  Other (Submitted on 8/17/2023)  Please describe your symptoms.: Side effects of medication  Have you had these symptoms before?: No  How long have you been having these symptoms?: 1-4 days  Please list any medications you are currently taking for this condition.: Hydroxycholorquine  Primary Reason for Visit (Submitted on 8/17/2023)  What is the primary reason for your visit?: Other    Patient would like to pursue cardiac restratification, she prefers more definitive testing, particularly CT coronary calcium score.    Subjective     The following portions of the patient's history were reviewed and updated as appropriate: allergies, current medications, past family history, past medical history, past social history, past surgical history and problem list.    Allergies   Allergen Reactions    Penicillins Hives       Review of Systems  Constitutional: Negative for fever. Negative for chills, diaphoresis; longstanding paroxysmal malaise/fatigue.  Weight loss with effort.  HENT: No dysphagia; no changes to vision/hearing/smell/taste; no epistaxis  Eyes: Negative for redness and visual disturbance.   Respiratory: negative for shortness of breath. Negative for chest pain . Negative for cough and chest tightness.   Cardiovascular: Negative for chest pain and palpitations.   Gastrointestinal: Negative for abdominal distention, abdominal pain and blood in stool.   Endocrine: Negative for cold intolerance and heat intolerance.   Genitourinary: Negative for difficulty urinating, dysuria and frequency.   Musculoskeletal: Negative for arthralgias, back pain and myalgias.   Skin: Negative for color change, rash and  "wound.   Neurological: Negative for syncope, weakness and headaches.   Hematological: Negative for adenopathy. Does not bruise/bleed easily.   Psychiatric/Behavioral: Negative for confusion. The patient is not nervous/anxious.    Objective     Physical Exam   Vital Signs: /84   Pulse 64   Temp 98 °F (36.7 °C)   Resp 18   Ht 154.9 cm (61\")   Wt 68.5 kg (151 lb)   LMP  (LMP Unknown)   SpO2 98%   BMI 28.53 kg/m²       Patient's (Body mass index is 28.53 kg/m².) indicates that they are overweight (BMI 25-29.9) with health related conditions that include hypertension and dyslipidemias . Weight is improving with lifestyle modifications. BMI is is above average; BMI management plan is completed. We discussed low calorie, low carb based diet program, portion control, increasing exercise, joining a fitness center or start home based exercise program, and Weight Watchers or other Commercial based weight reduction program.      General Appearance: alert, oriented x 3, no acute distress.  Skin: warm and dry.   HEENT: Atraumatic.  pupils round and reactive to light and accommodation, oral mucosa pink and moist.  Nares patent without epistaxis.  External auditory canals are patent tympanic membranes intact.  Neck: supple, no JVD, trachea midline.  No thyromegaly  Lungs: CTA, unlabored breathing effort.  Heart: RRR, normal S1 and S2, no S3, no rub.  Abdomen: soft, non-tender, no palpable bladder, present bowel sounds to auscultation ×4.  No guarding or rigidity.  Extremities: no clubbing, cyanosis or edema.  Good range of motion actively and passively.  Symmetric muscle strength and development  Neuro: normal speech and mental status.  Cranial nerves II through XII intact.  No anosmia. DTR 2+; proprioception intact.  No focal motor/sensory deficits.    Advance Care Planning   ACP discussion was held with the patient during this visit. Patient does not have an advance directive, information provided.       Assessment " and Plan      Assessment/Plan:   Diagnoses and all orders for this visit:    1. Hypertension, essential (Primary)  -     nebivolol (BYSTOLIC) 10 MG tablet; Take 1 tablet by mouth Daily.  Dispense: 90 tablet; Refill: 2  -     Comprehensive Metabolic Panel  -     CBC & Differential  -     CT Cardiac Calcium Score Without Dye; Future    2. Screening for cardiovascular condition  -     CT Cardiac Calcium Score Without Dye; Future    3. Familial hypercholesterolemia  -     Lipid Panel  -     Comprehensive Metabolic Panel  -     CT Cardiac Calcium Score Without Dye; Future    4. Malaise and fatigue      Continue wt loss efforts.    Stop HCTZ and lisinopril; start once daily nevibolol.    Surveillance labs ordered today.    Will order CT coronary calcium scan with [facility].      Discussion Summary:    Discussed plan of care in detail with pt today; pt verb understanding and agrees.    I spent 30 minutes caring for Edyta on this date of service. This time includes time spent by me in the following activities:preparing for the visit, performing a medically appropriate examination and/or evaluation , counseling and educating the patient/family/caregiver, ordering medications, tests, or procedures, documenting information in the medical record, and care coordination    I have reviewed and updated all copied forward information, as appropriate.  I attest to the accuracy and relevance of any unchanged information.    Follow up:  Return in about 6 months (around 2/17/2024) for Recheck.     There are no Patient Instructions on file for this visit.    Bill Albrecht DO  09/18/23  08:24 EDT

## 2023-09-06 DIAGNOSIS — E78.2 MIXED DYSLIPIDEMIA: ICD-10-CM

## 2023-09-06 DIAGNOSIS — I87.2 VENOUS INSUFFICIENCY OF BOTH LOWER EXTREMITIES: ICD-10-CM

## 2023-09-06 DIAGNOSIS — I10 HYPERTENSION, ESSENTIAL: ICD-10-CM

## 2023-09-06 DIAGNOSIS — I10 HYPERTENSION, ESSENTIAL, BENIGN: ICD-10-CM

## 2023-09-06 DIAGNOSIS — E78.01 FAMILIAL HYPERCHOLESTEROLEMIA: ICD-10-CM

## 2023-09-06 RX ORDER — LISINOPRIL 20 MG/1
TABLET ORAL
Qty: 90 TABLET | Refills: 0 | Status: SHIPPED | OUTPATIENT
Start: 2023-09-06

## 2023-09-06 RX ORDER — TORSEMIDE 10 MG/1
TABLET ORAL
Qty: 90 TABLET | Refills: 0 | Status: SHIPPED | OUTPATIENT
Start: 2023-09-06

## 2023-09-06 RX ORDER — HYDROCHLOROTHIAZIDE 12.5 MG/1
TABLET ORAL
Qty: 90 TABLET | Refills: 0 | Status: SHIPPED | OUTPATIENT
Start: 2023-09-06

## 2023-09-06 RX ORDER — FENOFIBRATE 54 MG/1
TABLET ORAL
Qty: 90 TABLET | Refills: 0 | Status: SHIPPED | OUTPATIENT
Start: 2023-09-06

## 2023-09-06 RX ORDER — ROSUVASTATIN CALCIUM 40 MG/1
TABLET, COATED ORAL
Qty: 90 TABLET | Refills: 0 | Status: SHIPPED | OUTPATIENT
Start: 2023-09-06

## 2023-09-18 PROBLEM — L08.9 WOUND INFECTION: Status: RESOLVED | Noted: 2017-10-02 | Resolved: 2023-09-18

## 2023-09-18 PROBLEM — K62.5 BRIGHT RED RECTAL BLEEDING: Status: RESOLVED | Noted: 2020-03-20 | Resolved: 2023-09-18

## 2023-09-18 PROBLEM — T14.8XXA WOUND INFECTION: Status: RESOLVED | Noted: 2017-10-02 | Resolved: 2023-09-18

## 2023-09-18 PROBLEM — R11.0 NAUSEA: Status: RESOLVED | Noted: 2017-10-02 | Resolved: 2023-09-18

## 2023-09-22 ENCOUNTER — OFFICE VISIT (OUTPATIENT)
Dept: FAMILY MEDICINE CLINIC | Facility: CLINIC | Age: 53
End: 2023-09-22
Payer: COMMERCIAL

## 2023-09-22 VITALS
RESPIRATION RATE: 16 BRPM | WEIGHT: 149.2 LBS | TEMPERATURE: 98.2 F | HEART RATE: 78 BPM | OXYGEN SATURATION: 99 % | DIASTOLIC BLOOD PRESSURE: 80 MMHG | SYSTOLIC BLOOD PRESSURE: 122 MMHG | BODY MASS INDEX: 28.17 KG/M2 | HEIGHT: 61 IN

## 2023-09-22 DIAGNOSIS — M62.838 NECK MUSCLE SPASM: Primary | ICD-10-CM

## 2023-09-22 DIAGNOSIS — M54.2 NECK PAIN: ICD-10-CM

## 2023-09-22 PROBLEM — E66.09 OTHER OBESITY DUE TO EXCESS CALORIES: Status: ACTIVE | Noted: 2017-10-27

## 2023-09-22 PROBLEM — L03.314 CELLULITIS OF GROIN: Status: ACTIVE | Noted: 2017-10-27

## 2023-09-22 PROBLEM — D72.823 NEUTROPHILIC LEUKEMOID REACTION: Status: ACTIVE | Noted: 2017-10-27

## 2023-09-22 PROBLEM — M79.605 LEG PAIN, LEFT: Status: ACTIVE | Noted: 2017-10-30

## 2023-09-22 PROBLEM — T82.848A: Status: ACTIVE | Noted: 2017-11-06

## 2023-09-22 PROBLEM — D69.59 THROMBOCYTOPENIA, SECONDARY: Status: ACTIVE | Noted: 2017-10-27

## 2023-09-22 PROBLEM — M79.646 THUMB PAIN: Status: ACTIVE | Noted: 2018-12-05

## 2023-09-22 PROBLEM — M25.551 PAIN IN RIGHT HIP: Status: ACTIVE | Noted: 2018-09-28

## 2023-09-22 PROBLEM — R50.9 FEVER WITH CHILLS: Status: ACTIVE | Noted: 2018-09-28

## 2023-09-22 PROBLEM — A49.1 INFECTION DUE TO STREPTOCOCCUS AGALACTIAE: Status: ACTIVE | Noted: 2017-11-06

## 2023-09-22 RX ORDER — TACROLIMUS 1 MG/G
OINTMENT TOPICAL
COMMUNITY
Start: 2023-08-14

## 2023-09-22 NOTE — PROGRESS NOTES
"    Office Note     Name: Edyta Franco  : 1970   MRN: [id number]     Chief Complaint:  Neck Pain (Pt states for three weeks her neck has been hurting. )    History of Present Illness:  Edyta Franco is a 53 y.o. female who presents today for neck pain.  She reports that this has been ongoing for about 3 weeks.  Pain is left side of her neck.  She has had occasional radiation to the top of her scalp.  Pain is not related to exertion and not necessarily relieved with rest.  She reports the pain comes and goes at random times and is not necessarily better or worse in the morning or at night.  She did use heat as well as taking Aleve consistently twice a day for about a week without much relief in the symptoms.  Pain is not present at this time and is intermittent.  She does have concerns that this could be cardiac in nature with an abnormal presentation due to her history of hypercholesterolemia and family history of cardiac disease.  She is also on Plaquenil which does cause some cardiac side effects and she is worried regarding this.      Subjective         I have reviewed the following portions of the patient's history and these were updated and discussed with the patient as appropriate: past family history, past medical history, past social history, past surgical history, and problem list.     Objective     Vital Signs  /80   Pulse 78   Temp 98.2 °F (36.8 °C) (Oral)   Resp 16   Ht 154.9 cm (61\")   Wt 67.7 kg (149 lb 3.2 oz)   SpO2 99%   BMI 28.19 kg/m²   Estimated body mass index is 28.19 kg/m² as calculated from the following:    Height as of this encounter: 154.9 cm (61\").    Weight as of this encounter: 67.7 kg (149 lb 3.2 oz).    Physical Exam  Vitals reviewed.   Constitutional:       General: She is not in acute distress.     Appearance: Normal appearance. She is not ill-appearing.   HENT:      Head: Normocephalic.   Eyes:      Extraocular Movements: Extraocular movements intact. "   Neck:      Thyroid: No thyroid mass or thyromegaly.   Cardiovascular:      Rate and Rhythm: Normal rate.   Pulmonary:      Effort: Pulmonary effort is normal.      Breath sounds: Normal breath sounds.   Musculoskeletal:         General: Normal range of motion.      Cervical back: Normal range of motion and neck supple. Spasms present. No erythema, signs of trauma, rigidity, torticollis or tenderness. No pain with movement, spinous process tenderness or muscular tenderness. Normal range of motion.   Neurological:      Mental Status: She is alert and oriented to person, place, and time.   Psychiatric:         Mood and Affect: Mood normal.         Behavior: Behavior normal.                    Assessment and Plan     Diagnoses and all orders for this visit:    1. Neck muscle spasm (Primary)    2. Neck pain      Pain is in the neck that radiates to scalp. Patient has concerns that this could be atypical cardiac presentation. However, Pain is not present in the chest/substernal area, it is not reproduced with exertion, and not necessarily relieved by rest and unlikely to be cardiac in nature despite patient's hypercholesterolemia and strong family history. I do not think any further cardiac workup would be warranted at this time. Pain seems to be intermittent muscular spasms. Patient offered muscle relaxant but declined. Advised continued use of NSAIDs and heating pad for relief. Significant time spent discussing with patient typical cardiac pain signs and symptoms and when to go for further evaluation to ED.       This visit was billed based on time.  I spent 40 minutes caring for Edyta Franco on this date of service. This time includes time spent by me in the following activities:preparing for the visit, reviewing tests, obtaining and/or reviewing a separately obtained history, performing a medically appropriate examination and/or evaluation , counseling and educating the patient/family/caregiver, ordering  medications, tests, or procedures, referring and communicating with other health care professionals , documenting information in the medical record, independently interpreting results and communicating that information with the patient/family/caregiver, and care coordination.    Follow Up  Return if symptoms worsen or fail to improve.    At [facility], we believe that sharing information builds trust and better relationships. You are receiving this note because you recently visited [facility]. It is possible you will see health information before a provider has talked with you about it. This kind of information can be easy to misunderstand. To help you fully understand what it means for your health, we urge you to discuss this note with your provider.    Omi Weinstein MD  [facility] [place] [facility]

## 2023-09-26 NOTE — PROGRESS NOTES
Edyta Franco  [id number]  1970      Reason for visit:  History of vulvar cancer, lichen sclerosus     History of present illness:  The patient is a 53 y.o. year old female who presents today for treatment and evaluation of the above issues.    Patient last saw Dr. Fong on 6/23, continued to have vaginal irritation and vulvar skin thinning. Stopped taking clobetasol about 1 year ago. Referred to dermatology who prescribed Plaquenil, tacrolimus gel, and flucinonide topical ointment to be applied BID. She has been using the plaquenil and flucinonide since June. She thinks she saw a little bit of improvement in her symptoms since starting, but notes her dermatologist was not satisfied with the results, so she recently added the Tacrolimus gel. She notes the tacrolimus burns her skin, so she has not been using it for the last few days.    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     Oncologic History:  Oncology/Hematology History   Malignant neoplasm of vulva, unspecified   8/14/2017 Biopsy    History of lichens sclerosus, on clobetasol therapy. Pt presented to Dr. Abigail Fong with c/o painful ulcerative left vulvar lesion. Biopsy revealed invasive SCC, extending into deep margin at least 3.5 mm. Referred to Gyn Oncology     8/30/2017 Surgery    Modified radical vulvectomy, left inguinofemoral sentinel lymph node dissection, right inguinofemoral lymph node dissection, and mapping biopsies of the vulva. Surgery by Dr. Nassar and Dr. Bree Russell.     Pathology revealed SCC, focally keratinizing with 0.3 cm invasion. LVSI negative, lymph nodes negative, mapping biopsies negative. Stage IB grade 2       9/24/2017 - 9/27/2017 Other Event    Inpatient hospital admission for inguinal incision cellulitis, inguinal fluid collection, and SIRS. Treated with IV antibiotics and sent home on oral antibiotics.      10/26/2017 - 10/27/2017 Other Event    2 day inpatient admission  for recurrent cellulitis, resolved with antibiotic management           Past Medical History:   Diagnosis Date    Epilepsy 2020    Patient reported she was taken off her medication for this apx 10 years ago.  Reported no seizure activity for apx. 8 years ago.      Fatigue     Hypercholesteremia     Hypertension     Lichen sclerosus of female genitalia     Rectal bleeding 2020    Vulva cancer     Wears contact lenses 2020    Patient advised contacts will need to be removed DOS       Past Surgical History:   Procedure Laterality Date     SECTION      COLONOSCOPY N/A 2020    Procedure: COLONOSCOPY WITH BIOPSY;  Surgeon: Eleni Russell MD;  Location: [facility] ENDOSCOPY;  Service: Gastroenterology;  Laterality: N/A;    HAND SURGERY Right 2014    LUMBAR LAMINECTOMY  2011    HEMILAMI RT-C7-T1, RT C7-T1 MEDIAL FACET & CT-T1 DISC (Dr. Cabrera    ORIF ANKLE FRACTURE Right     TOTAL ABDOMINAL HYSTERECTOMY WITH SALPINGO OOPHORECTOMY      Dr. Richard Armendariz - [place] , [place]     VULVECTOMY N/A 2017    Procedure: MODIFIED RADICAL VULVECTOMY, BILATERAL SENTINAL LYMPH NODE DISSECTION, MAPPING BIOPSIES OF VULVA;  Surgeon: Isabela Nassar MD;  Location: [facility] OR;  Service:        MEDICATIONS: The current medication list was reviewed with the patient and updated in the EMR this date per the Medical Assistant. Medication dosages and frequencies were confirmed to be accurate.      Allergies:  is allergic to penicillins and ceftriaxone.    Social History:   Social History     Socioeconomic History    Marital status:     Number of children: 2   Tobacco Use    Smoking status: Never     Passive exposure: Never    Smokeless tobacco: Never   Vaping Use    Vaping Use: Never used   Substance and Sexual Activity    Alcohol use: Yes     Comment: 2x month    Drug use: Never    Sexual activity: Yes     Partners: Male     Birth control/protection: Surgical       Family History:    Family  "History   Problem Relation Age of Onset    Diabetes type II Father     Congenital heart disease Father     Hypertension Father     Hyperlipidemia Father     Ovarian cancer Mother     Diabetes type II Mother     Congenital heart disease Mother     Hypertension Mother     Stroke Mother     Hyperlipidemia Mother     Heart attack Other     Hyperlipidemia Other     Breast cancer Neg Hx     Colon cancer Neg Hx     Uterine cancer Neg Hx        Health Maintenance:    Health Maintenance   Topic Date Due    Pneumococcal Vaccine 0-64 (1 - PCV) Never done    HEPATITIS C SCREENING  Never done    ZOSTER VACCINE (1 of 2) Never done    ANNUAL PHYSICAL  02/25/2021    COVID-19 Vaccine (3 - Moderna series) 10/25/2021    INFLUENZA VACCINE  10/01/2023    LIPID PANEL  12/30/2023    PAP SMEAR  05/25/2024    MAMMOGRAM  06/22/2024    BMI FOLLOWUP  08/17/2024    DXA SCAN  06/06/2025    TDAP/TD VACCINES (2 - Td or Tdap) 03/01/2030    COLORECTAL CANCER SCREENING  03/23/2030     Review of Systems  Please refer to HPI, remainder of ROS negative.    Physical Exam    Vitals:    09/27/23 1327   BP: 117/71   Pulse: 51   Resp: 17   Temp: 97.1 °F (36.2 °C)   TempSrc: Temporal   SpO2: 98%   Weight: 67.7 kg (149 lb 4.8 oz)   Height: 154.9 cm (60.98\")   PainSc:   5       Body mass index is 28.22 kg/m².  Wt Readings from Last 3 Encounters:   09/27/23 67.7 kg (149 lb 4.8 oz)   09/22/23 67.7 kg (149 lb 3.2 oz)   08/17/23 68.5 kg (151 lb)     GENERAL: Alert, well-appearing female appearing her stated age who is in no apparent distress.    HEENT: Sclera anicteric. Head normocephalic, atraumatic.  CARDIOVASCULAR:  no peripheral edema.  RESPIRATORY:normal respiratory effort  SKIN:  Warm, dry, well-perfused.  All visible areas intact.  No rashes, lesions, ulcers.  PSYCHIATRIC: AO x3, with appropriate affect, normal thought processes.  NEUROLOGIC: No focal deficits.  Moves extremities well.  MUSCULOSKELETAL: Normal gait and station.   EXTREMITIES:   No cyanosis, " clubbing, symmetric.     PELVIC exam:    External genitalia consistent with diffuse lichen sclerosis.  Thickened area at base of left labia minora and anterior vaginal introitus, stable.  Continues to have skin thinning and excoriations, overall stable exam.     ECOG PS 0    PROCEDURES: None    Diagnostic Data:      Lab Results   Component Value Date    WBC 10.45 01/06/2023    HGB 14.1 01/06/2023    HCT 41.3 01/06/2023    MCV 89.8 01/06/2023     01/06/2023    NEUTROABS 7.60 (H) 01/06/2023    GLUCOSE 169 (H) 01/06/2023    BUN 13 01/06/2023    CREATININE 0.75 01/06/2023    EGFRIFNONA 77 10/18/2021    EGFRIFAFRI 84 10/28/2020     01/06/2023    K 4.0 01/06/2023     01/06/2023    CO2 24.7 01/06/2023    MG 2.1 01/06/2023    CALCIUM 9.4 01/06/2023    ALBUMIN 4.3 01/06/2023    AST 18 01/06/2023    ALT 19 01/06/2023    BILITOT 0.2 01/06/2023     No results found for:       Assessment & Plan   This is a 53 y.o. woman with history of vulvar cancer, diffuse lichen sclerosis.    Encounter Diagnoses   Name Primary?    Malignant neoplasm of vulva, unspecified Yes    Vulvar pruritus      -Status post intradermal steroid injection without significant improvement.  -At last visit, recommended topical compound including ketamine and lidocaine, but patient did not take it because of increased skin burning.   -Recently saw dermatology who prescribed Plaquenil gel tacrolimus and flucinonide topical ointment  - She notes possible improvement, but still notes discomfort. She goes back to dermatologist tomorrow.   - Discussed that we recommend she continue with dermatologist recommendations. She may need repeat biopsies in the future, but no signs of cancer recurrence at this time.  -She will follow up in 6  months    Pain assessment was performed today as a part of patient’s care.  For patients with pain related to surgery, gynecologic malignancy or cancer treatment, the plan is as noted in the assessment/plan.  For  patients with pain not related to these issues, they are to seek any further needed care from a more appropriate provider, such as PCP.      No orders of the defined types were placed in this encounter.    FOLLOW UP: 6 months    Karolina Laureano MD   PGY3- OBGYN Resident   [facility]    I spent 20 minutes caring for Edyta on this date of service. This time includes time spent by me in the following activities: preparing for the visit, reviewing tests, performing a medically appropriate examination and/or evaluation, counseling and educating the patient/family/caregiver, ordering medications, tests, or procedures, referring and communicating with other health care professionals, and documenting information in the medical record    Patient was seen and examined with Dr. Laureano,  resident, who performed portions of the examination and documentation for this patient's care under my direct supervision.  I agree with the above documentation and plan.    Isabela Nassar MD  09/29/23  20:08 EDT

## 2023-09-27 ENCOUNTER — OFFICE VISIT (OUTPATIENT)
Dept: GYNECOLOGIC ONCOLOGY | Facility: CLINIC | Age: 53
End: 2023-09-27
Payer: COMMERCIAL

## 2023-09-27 VITALS
OXYGEN SATURATION: 98 % | DIASTOLIC BLOOD PRESSURE: 71 MMHG | BODY MASS INDEX: 28.19 KG/M2 | TEMPERATURE: 97.1 F | RESPIRATION RATE: 17 BRPM | HEART RATE: 51 BPM | SYSTOLIC BLOOD PRESSURE: 117 MMHG | WEIGHT: 149.3 LBS | HEIGHT: 61 IN

## 2023-09-27 DIAGNOSIS — C51.9 MALIGNANT NEOPLASM OF VULVA, UNSPECIFIED: Primary | ICD-10-CM

## 2023-09-27 DIAGNOSIS — L29.2 VULVAR PRURITUS: ICD-10-CM

## 2023-09-27 PROCEDURE — 99213 OFFICE O/P EST LOW 20 MIN: CPT | Performed by: OBSTETRICS & GYNECOLOGY

## 2023-10-26 ENCOUNTER — HOSPITAL ENCOUNTER (OUTPATIENT)
Dept: CT IMAGING | Facility: HOSPITAL | Age: 53
Discharge: HOME | End: 2023-10-26
Admitting: FAMILY MEDICINE

## 2023-10-26 DIAGNOSIS — Z13.6 SCREENING FOR CARDIOVASCULAR CONDITION: ICD-10-CM

## 2023-10-26 DIAGNOSIS — E78.01 FAMILIAL HYPERCHOLESTEROLEMIA: ICD-10-CM

## 2023-10-26 DIAGNOSIS — I10 HYPERTENSION, ESSENTIAL: ICD-10-CM

## 2023-10-26 PROCEDURE — 75571 CT HRT W/O DYE W/CA TEST: CPT

## 2023-10-27 ENCOUNTER — TELEPHONE (OUTPATIENT)
Dept: FAMILY MEDICINE CLINIC | Facility: CLINIC | Age: 53
End: 2023-10-27

## 2023-10-27 NOTE — TELEPHONE ENCOUNTER
PATIENT WANTS AN APPT TO SEE ABOUT TEST RESULTS DR. RODRIGUEZ DOES NOT HAVE ANYTHING FOR A WHILE SO CAN SOMEONE CALL HER ABOUT THESE PLEASE.SHE JUST HAD IT YESTERDAY CT ALEE CARDIAC CA SCORE WO SAMUEL

## 2023-10-27 NOTE — TELEPHONE ENCOUNTER
Per Dr. Albrecht he wants to see this patient on Monday. She will be here at 9am. Patient has been informed.

## 2023-10-27 NOTE — TELEPHONE ENCOUNTER
has called back again wanting an appointment only with Dr. Albrecht. They read the results off [facility] and are having some concerns. They are wanting a call back today.

## 2023-10-27 NOTE — TELEPHONE ENCOUNTER
Caller: SYLVIE OLVERA    Relationship to patient: Emergency Contact    Best call back number: [phone]    Chief complaint: CT SCAN ON CHEST RESULTS    Type of visit: OV    Requested date: ASAP     If rescheduling, when is the original appointment: NO APPOINTMENT AVAILABLE UNTIL DECEMBER     Additional notes: PATIENT IS CONCERNED AND WOULD LIKE TO BE SEEN TO DISCUSS RESULTS

## 2023-10-30 ENCOUNTER — OFFICE VISIT (OUTPATIENT)
Dept: FAMILY MEDICINE CLINIC | Facility: CLINIC | Age: 53
End: 2023-10-30
Payer: COMMERCIAL

## 2023-10-30 VITALS
HEIGHT: 61 IN | WEIGHT: 148 LBS | HEART RATE: 69 BPM | TEMPERATURE: 98 F | OXYGEN SATURATION: 96 % | SYSTOLIC BLOOD PRESSURE: 116 MMHG | BODY MASS INDEX: 27.94 KG/M2 | RESPIRATION RATE: 16 BRPM | DIASTOLIC BLOOD PRESSURE: 72 MMHG

## 2023-10-30 DIAGNOSIS — R93.1 ABNORMAL SCREENING CARDIAC CT: ICD-10-CM

## 2023-10-30 DIAGNOSIS — E78.01 FAMILIAL HYPERCHOLESTEROLEMIA: ICD-10-CM

## 2023-10-30 DIAGNOSIS — Z82.49 FAMILY HISTORY OF CARDIAC DISORDER: ICD-10-CM

## 2023-10-30 DIAGNOSIS — I10 HYPERTENSION, ESSENTIAL: Primary | ICD-10-CM

## 2023-10-30 DIAGNOSIS — R07.89 CHEST DISCOMFORT: ICD-10-CM

## 2023-10-30 PROCEDURE — 99214 OFFICE O/P EST MOD 30 MIN: CPT | Performed by: FAMILY MEDICINE

## 2023-10-30 NOTE — PROGRESS NOTES
Established Patient        Chief Complaint:   Chief Complaint   Patient presents with    Follow-up     Discuss lab results    Hypertension        Edyta Franco is a 53 y.o. female    History of Present Illness:   Here today in follow-up of her hypertension, dyslipidemia and atypical chest discomfort.    Patient underwent CT cardiac calcium testing recently, with significantly high calcium scores for coronary arteries.    Patient has numerous risk factors including dyslipidemia, hypertension and significant family history of coronary atherosclerotic disease/cardiac events.  Both her mother and father succumbed to cardiac events in their 40s/50s.    Patient denies any recent cardiac risk stratification.    Subjective     The following portions of the patient's history were reviewed and updated as appropriate: allergies, current medications, past family history, past medical history, past social history, past surgical history and problem list.    Allergies   Allergen Reactions    Penicillins Hives    Ceftriaxone Other (See Comments)       Review of Systems  Constitutional: Negative for fever. Negative for chills, diaphoresis, fatigue and unexpected weight change.   HENT: No dysphagia; no changes to vision/hearing/smell/taste; no epistaxis  Eyes: Negative for redness and visual disturbance.   Respiratory: negative for shortness of breath. Negative for chest pain . Negative for cough and chest tightness.   Cardiovascular: Atypical left chest discomfort as per above, no associated palpitations.  Pain does radiate to her left shoulder and scapular area at times.  Gastrointestinal: Negative for abdominal distention, abdominal pain and blood in stool.   Endocrine: Negative for cold intolerance and heat intolerance.   Genitourinary: Negative for difficulty urinating, dysuria and frequency.   Musculoskeletal: Negative for arthralgias, back pain and myalgias.   Skin: Negative for color change, rash and  "wound.   Neurological: Negative for syncope, weakness and headaches.   Hematological: Negative for adenopathy. Does not bruise/bleed easily.   Psychiatric/Behavioral: Negative for confusion. The patient is not nervous/anxious.    Objective     Physical Exam   Vital Signs: /72   Pulse 69   Temp 98 °F (36.7 °C)   Resp 16   Ht 154.9 cm (61\")   Wt 67.1 kg (148 lb)   LMP  (LMP Unknown)   SpO2 96%   BMI 27.96 kg/m²       Patient's (Body mass index is 27.96 kg/m².) indicates that they are overweight (BMI 25-29.9) with health related conditions that include hypertension, dyslipidemias, and osteoarthritis . Weight is unchanged. BMI is is above average; BMI management plan is completed. We discussed low calorie, low carb based diet program and portion control.      General Appearance: alert, oriented x 3, no acute distress.  Skin: warm and dry.   HEENT: Atraumatic.  pupils round and reactive to light and accommodation, oral mucosa pink and moist.  Nares patent without epistaxis.  External auditory canals are patent tympanic membranes intact.  Neck: supple, no JVD, trachea midline.  No thyromegaly  Lungs: CTA, unlabored breathing effort.  Heart: RRR, normal S1 and S2, no S3, no rub.  Abdomen: soft, non-tender, no palpable bladder, present bowel sounds to auscultation ×4.  No guarding or rigidity.  Extremities: no clubbing, cyanosis or edema.  Good range of motion actively and passively.  Symmetric muscle strength and development  Neuro: normal speech and mental status.  Cranial nerves II through XII intact.  No anosmia. DTR 2+; proprioception intact.  No focal motor/sensory deficits.    Advance Care Planning   ACP discussion was held with the patient during this visit. Patient does not have an advance directive, information provided.       Assessment and Plan      Assessment/Plan:   Diagnoses and all orders for this visit:    1. Hypertension, essential (Primary)  -     Ambulatory Referral to Cardiology    2. " Familial hypercholesterolemia  -     Ambulatory Referral to Cardiology    3. Abnormal screening cardiac CT  -     Ambulatory Referral to Cardiology    4. Family history of cardiac disorder  -     Ambulatory Referral to Cardiology    Patient has numerous risk factors for coronary atherosclerotic disease, including hypertension, dyslipidemia and strong family history.    Vital signs demonstrate hemodynamic stability in office today.    I have placed a referral to be seen by cardiology for further risk stratification needs, as she has a substantially elevated coronary calcium score.        Discussion Summary:    Discussed plan of care in detail with pt today; pt verb understanding and agrees.    I spent 30 minutes caring for Edyta on this date of service. This time includes time spent by me in the following activities:preparing for the visit, reviewing tests, performing a medically appropriate examination and/or evaluation , counseling and educating the patient/family/caregiver, ordering medications, tests, or procedures, referring and communicating with other health care professionals , documenting information in the medical record, and care coordination    I have reviewed and updated all copied forward information, as appropriate.  I attest to the accuracy and relevance of any unchanged information.    Follow up:  No follow-ups on file.     There are no Patient Instructions on file for this visit.    Bill Albrecht DO  10/30/23  13:44 EDT

## 2023-11-08 ENCOUNTER — OFFICE VISIT (OUTPATIENT)
Dept: CARDIOLOGY | Facility: CLINIC | Age: 53
End: 2023-11-08
Payer: COMMERCIAL

## 2023-11-08 VITALS
BODY MASS INDEX: 27.56 KG/M2 | RESPIRATION RATE: 18 BRPM | HEIGHT: 61 IN | SYSTOLIC BLOOD PRESSURE: 108 MMHG | DIASTOLIC BLOOD PRESSURE: 72 MMHG | HEART RATE: 87 BPM | WEIGHT: 146 LBS | OXYGEN SATURATION: 99 %

## 2023-11-08 DIAGNOSIS — I10 HYPERTENSION, ESSENTIAL: ICD-10-CM

## 2023-11-08 DIAGNOSIS — E78.01 FAMILIAL HYPERCHOLESTEROLEMIA: ICD-10-CM

## 2023-11-08 DIAGNOSIS — R07.9 CHEST PAIN, UNSPECIFIED TYPE: Primary | ICD-10-CM

## 2023-11-08 PROCEDURE — 99214 OFFICE O/P EST MOD 30 MIN: CPT | Performed by: INTERNAL MEDICINE

## 2023-11-08 NOTE — PROGRESS NOTES
[facility] Cardiology Office Follow Up Note    Edyta Franco  [id number]  2023    Primary Care Provider: Bill Albrecht DO    Chief Complaint: Left shoulder and neck pain    History of Present Illness:   Mrs. Edyta Franco is a [age]y.o. female who presents to the Cardiology Clinic for evaluation of left shoulder and neck pain.  The patient has a past medical history significant for hypertension, hyperlipidemia, venous insufficiency with bilateral lower extremity swelling, and obesity.  Since last being seen on cardiology clinic in , the patient more recently had a coronary calcium score which was high risk in the 900s.  Given her elevated coronary calcium score and a strong family history of coronary artery disease, she was referred back to the cardiology clinic for evaluation.  The patient denies any significant chest pain or chest discomfort.  She does have a left shoulder pain with radiation to her left neck which has been suspected to be musculoskeletal in etiology.  The discomfort occurs both at rest as well as with exertion.  No particular worsening with movement of her left upper extremity.  She does report occasional worsening with deep inspiration.  No associated nausea, vomiting, or diaphoresis.  She denies any significant exertional dyspnea.  No other specific complaints today.     Past Cardiac Testin. Last Coronary Angio: None  2. Prior Stress Testing: None  3. Last Echo: Exercise stress echocardiogram               1.  No evidence of inducible ischemia  4. Prior Holter Monitor: 7-day Holter 2020              1.  The predominant rhythm is sinus rhythm with an average heart rate 66 bpm.              2.  Normal atrioventricular conduction.              3.  No sustained supraventricular or ventricular arrhythmias.              4.  No ectopy.              5.  No symptom diary reported.    Review of Systems:   Review of Systems   Constitutional:   Negative for activity change, appetite change, chills, diaphoresis, fatigue, fever, unexpected weight gain and unexpected weight loss.   Eyes:  Negative for blurred vision and double vision.   Respiratory:  Negative for cough, chest tightness, shortness of breath and wheezing.    Cardiovascular:  Negative for chest pain, palpitations and leg swelling.   Gastrointestinal:  Negative for abdominal pain, anal bleeding, blood in stool and GERD.   Endocrine: Negative for cold intolerance and heat intolerance.   Genitourinary:  Negative for hematuria.   Musculoskeletal:         Left shoulder discomfort   Neurological:  Negative for dizziness, syncope, weakness and light-headedness.   Hematological:  Does not bruise/bleed easily.   Psychiatric/Behavioral:  Negative for depressed mood and stress. The patient is not nervous/anxious.        I have reviewed and/or updated the patient's past medical, past surgical, family, social history, problem list and allergies as appropriate.     Medications:     Current Outpatient Medications:     estradiol (ESTRACE VAGINAL) 0.1 MG/GM vaginal cream, Insert 1 g into the vagina Daily., Disp: 1 each, Rfl: 12    estradiol (ESTRACE) 0.5 MG tablet, Take 1 tablet by mouth Daily., Disp: 90 tablet, Rfl: 3    fenofibrate (TRICOR) 54 MG tablet, Take 1 tablet by mouth once daily, Disp: 90 tablet, Rfl: 0    hydroCHLOROthiazide (HYDRODIURIL) 12.5 MG tablet, Take 1 tablet by mouth once daily, Disp: 90 tablet, Rfl: 0    hydroxychloroquine (PLAQUENIL) 200 MG tablet, , Disp: , Rfl:     lisinopril (PRINIVIL,ZESTRIL) 20 MG tablet, Take 1 tablet by mouth once daily, Disp: 90 tablet, Rfl: 0    rosuvastatin (CRESTOR) 40 MG tablet, TAKE 1 TABLET BY MOUTH AT NIGHT, Disp: 90 tablet, Rfl: 0    torsemide (DEMADEX) 10 MG tablet, Take 1 tablet by mouth once daily, Disp: 90 tablet, Rfl: 0    Physical Exam:  Vital Signs:   Vitals:    11/08/23 0903   BP: 108/72   BP Location: Left arm   Patient Position: Sitting   Pulse:  "87   Resp: 18   SpO2: 99%   Weight: 66.2 kg (146 lb)   Height: 154.9 cm (61\")       Physical Exam  Constitutional:       General: She is not in acute distress.     Appearance: Normal appearance. She is well-developed. She is not diaphoretic.   HENT:      Head: Normocephalic and atraumatic.   Eyes:      General: No scleral icterus.     Pupils: Pupils are equal, round, and reactive to light.   Neck:      Trachea: No tracheal deviation.   Cardiovascular:      Rate and Rhythm: Normal rate and regular rhythm.      Heart sounds: Normal heart sounds. No murmur heard.     No friction rub. No gallop.      Comments: Normal JVD.  Pulmonary:      Effort: Pulmonary effort is normal. No respiratory distress.      Breath sounds: Normal breath sounds. No stridor. No wheezing or rales.   Chest:      Chest wall: No tenderness.   Abdominal:      General: Bowel sounds are normal. There is no distension.      Palpations: Abdomen is soft.      Tenderness: There is no abdominal tenderness. There is no guarding or rebound.   Musculoskeletal:         General: No swelling. Normal range of motion.      Cervical back: Neck supple. No tenderness.   Lymphadenopathy:      Cervical: No cervical adenopathy.   Skin:     General: Skin is warm and dry.      Findings: No erythema.   Neurological:      General: No focal deficit present.      Mental Status: She is alert and oriented to person, place, and time.   Psychiatric:         Mood and Affect: Mood normal.         Behavior: Behavior normal.         Results Review:   I reviewed the patient's new clinical results.  I personally viewed and interpreted the patient's EKG/Telemetry data        Assessment / Plan:     1.  Elevated coronary calcium score  -- Strong family history of coronary artery disease, high risk coronary calcium score in the 900s  --Recent ECG without acute ischemic changes  --Left shoulder and left neck pain suspected to be muscular in etiology, however cannot rule out angina  --Will " proceed with GXT to further rule out ischemia (stress echo in 2016 with no ischemia at that time)  --Given elevated coronary calcium score, continue high intensity statin     2.  Hypertension  --BP well controlled     3. Hyperlipidemia  --On statin and fibrate  -- Lipid profile in 12/22 showed , HDL 54, triglycerides 403    Follow Up:   Return in about 1 year (around 11/8/2024).      Thank you for allowing me to participate in the care of your patient. Please to not hesitate to contact me with additional questions or concerns.     EDAURDO Urban MD  Interventional Cardiology   11/08/2023  09:11 EST

## 2023-12-12 DIAGNOSIS — I10 HYPERTENSION, ESSENTIAL, BENIGN: ICD-10-CM

## 2023-12-12 DIAGNOSIS — E78.2 MIXED DYSLIPIDEMIA: ICD-10-CM

## 2023-12-12 DIAGNOSIS — I10 HYPERTENSION, ESSENTIAL: ICD-10-CM

## 2023-12-12 DIAGNOSIS — I87.2 VENOUS INSUFFICIENCY OF BOTH LOWER EXTREMITIES: ICD-10-CM

## 2023-12-12 RX ORDER — FENOFIBRATE 54 MG/1
TABLET ORAL
Qty: 90 TABLET | Refills: 0 | Status: SHIPPED | OUTPATIENT
Start: 2023-12-12

## 2023-12-12 RX ORDER — HYDROCHLOROTHIAZIDE 12.5 MG/1
TABLET ORAL
Qty: 90 TABLET | Refills: 0 | Status: SHIPPED | OUTPATIENT
Start: 2023-12-12

## 2023-12-12 RX ORDER — LISINOPRIL 20 MG/1
TABLET ORAL
Qty: 90 TABLET | Refills: 0 | Status: SHIPPED | OUTPATIENT
Start: 2023-12-12

## 2023-12-12 RX ORDER — TORSEMIDE 10 MG/1
TABLET ORAL
Qty: 90 TABLET | Refills: 0 | Status: SHIPPED | OUTPATIENT
Start: 2023-12-12

## 2024-02-19 ENCOUNTER — OFFICE VISIT (OUTPATIENT)
Dept: FAMILY MEDICINE CLINIC | Facility: CLINIC | Age: 54
End: 2024-02-19
Payer: COMMERCIAL

## 2024-02-19 VITALS
DIASTOLIC BLOOD PRESSURE: 82 MMHG | BODY MASS INDEX: 29.53 KG/M2 | OXYGEN SATURATION: 95 % | HEIGHT: 61 IN | SYSTOLIC BLOOD PRESSURE: 112 MMHG | WEIGHT: 156.4 LBS | HEART RATE: 99 BPM

## 2024-02-19 DIAGNOSIS — I10 HYPERTENSION, ESSENTIAL: ICD-10-CM

## 2024-02-19 DIAGNOSIS — E78.01 FAMILIAL HYPERCHOLESTEROLEMIA: Primary | ICD-10-CM

## 2024-02-19 DIAGNOSIS — E78.2 MIXED DYSLIPIDEMIA: ICD-10-CM

## 2024-02-19 PROCEDURE — 99213 OFFICE O/P EST LOW 20 MIN: CPT | Performed by: FAMILY MEDICINE

## 2024-02-19 RX ORDER — PRAVASTATIN SODIUM 20 MG
20 TABLET ORAL DAILY
Qty: 90 TABLET | Refills: 3 | Status: SHIPPED | OUTPATIENT
Start: 2024-02-19

## 2024-02-19 RX ORDER — DESMOPRESSIN ACETATE 0.2 MG/1
TABLET ORAL
COMMUNITY
Start: 2023-11-13 | End: 2024-02-19

## 2024-02-19 RX ORDER — DESOXIMETASONE 2.5 MG/G
OINTMENT TOPICAL
COMMUNITY

## 2024-02-19 RX ORDER — FENOFIBRATE 160 MG/1
160 TABLET ORAL DAILY
Qty: 90 TABLET | Refills: 2 | Status: SHIPPED | OUTPATIENT
Start: 2024-02-19

## 2024-02-19 RX ORDER — PREDNISONE 10 MG/1
TABLET ORAL
COMMUNITY
Start: 2023-11-30 | End: 2024-02-19

## 2024-02-19 NOTE — PROGRESS NOTES
Established Patient        Chief Complaint:   Chief Complaint   Patient presents with    Follow-up     6mo follow up.  Pt states she had cholesterol work done, but it is still high. Wants to know what to do about that.        Edyta Franco is a 53 y.o. female    History of Present Illness:   Here today in scheduled follow-up visit of dyslipidemia and hypertension.    Denies chest pain, syncope, palpitations or vertigo.  Denies fever, chills or night sweats.  Maintains an active lifestyle, balanced dietary intake; reports good hydration habits.  Denies hematuria/dysuria.  Denies any BRB/BTS.  No new rashes.  Denies orthopnea, epistaxis or hemoptysis.    Subjective     The following portions of the patient's history were reviewed and updated as appropriate: allergies, current medications, past family history, past medical history, past social history, past surgical history and problem list.    Allergies   Allergen Reactions    Penicillins Hives    Ceftriaxone Other (See Comments)       Review of Systems  Constitutional: Negative for fever. Negative for chills, diaphoresis, fatigue and unexpected weight change.   HENT: No dysphagia; no changes to vision/hearing/smell/taste; no epistaxis  Eyes: Negative for redness and visual disturbance.   Respiratory: negative for shortness of breath. Negative for chest pain . Negative for cough and chest tightness.   Cardiovascular: Negative for chest pain and palpitations.   Gastrointestinal: Negative for abdominal distention, abdominal pain and blood in stool.   Endocrine: Negative for cold intolerance and heat intolerance.   Genitourinary: Negative for difficulty urinating, dysuria and frequency.   Musculoskeletal: Negative for arthralgias, back pain and myalgias.   Skin: Negative for color change, rash and wound.   Neurological: Negative for syncope, weakness and headaches.   Hematological: Negative for adenopathy. Does not bruise/bleed easily.  "  Psychiatric/Behavioral: Negative for confusion. The patient is not nervous/anxious.    Objective     Physical Exam   Vital Signs: /82   Pulse 99   Ht 154.9 cm (61\")   Wt 70.9 kg (156 lb 6.4 oz)   LMP  (LMP Unknown)   SpO2 95%   BMI 29.55 kg/m²       Patient's (Body mass index is 29.55 kg/m².) indicates that they are overweight (BMI 25-29.9) with health related conditions that include hypertension and dyslipidemias . Weight is improving with lifestyle modifications. BMI is is above average; BMI management plan is completed. We discussed portion control and increasing exercise.      General Appearance: alert, oriented x 3, no acute distress.  Skin: warm and dry.   HEENT: Atraumatic.  pupils round and reactive to light and accommodation, oral mucosa pink and moist.  Nares patent without epistaxis.  External auditory canals are patent tympanic membranes intact.  Neck: supple, no JVD, trachea midline.  No thyromegaly  Lungs: CTA, unlabored breathing effort.  Heart: RRR, normal S1 and S2, no S3, no rub.  Abdomen: soft, non-tender, no palpable bladder, present bowel sounds to auscultation ×4.  No guarding or rigidity.  Extremities: no clubbing, cyanosis or edema.  Good range of motion actively and passively.  Symmetric muscle strength and development  Neuro: normal speech and mental status.  Cranial nerves II through XII intact.  No anosmia. DTR 2+; proprioception intact.  No focal motor/sensory deficits.    Advance Care Planning   ACP discussion was held with the patient during this visit. Patient does not have an advance directive, information provided.       Assessment and Plan      Assessment/Plan:   Diagnoses and all orders for this visit:    1. Familial hypercholesterolemia (Primary)  -     fenofibrate 160 MG tablet; Take 1 tablet by mouth Daily.  Dispense: 90 tablet; Refill: 2  -     pravastatin (PRAVACHOL) 20 MG tablet; Take 1 tablet by mouth Daily.  Dispense: 90 tablet; Refill: 3  -     NMR " LipoProfile; Future    2. Mixed dyslipidemia    3. Hypertension, essential    Vital signs demonstrate hemodynamic stability.    Demonstrates no findings of unstable angina at this time.    Continue statin therapy, bile acid sequestrant.  Utilize low-cholesterol dietary intake.  Follow-up/surveillance and MRI ordered.  Continue low-cholesterol dietary intake.        Discussion Summary:    Discussed plan of care in detail with pt today; pt verb understanding and agrees.    I spent 25 minutes caring for Edyta on this date of service. This time includes time spent by me in the following activities:preparing for the visit, performing a medically appropriate examination and/or evaluation , counseling and educating the patient/family/caregiver, ordering medications, tests, or procedures, documenting information in the medical record, and care coordination    I have reviewed and updated all copied forward information, as appropriate.  I attest to the accuracy and relevance of any unchanged information.    Follow up:  Return in about 6 months (around 8/19/2024).     There are no Patient Instructions on file for this visit.    Bill Albrecht DO  03/04/24  20:53 EST

## 2024-03-04 ENCOUNTER — OFFICE VISIT (OUTPATIENT)
Dept: FAMILY MEDICINE CLINIC | Facility: CLINIC | Age: 54
End: 2024-03-04
Payer: COMMERCIAL

## 2024-03-04 VITALS — OXYGEN SATURATION: 92 % | HEIGHT: 61 IN | WEIGHT: 156 LBS | BODY MASS INDEX: 29.45 KG/M2 | HEART RATE: 83 BPM

## 2024-03-04 DIAGNOSIS — I10 HYPERTENSION, ESSENTIAL: Primary | ICD-10-CM

## 2024-03-04 DIAGNOSIS — J06.9 UPPER RESPIRATORY TRACT INFECTION DUE TO COVID-19 VIRUS: ICD-10-CM

## 2024-03-04 DIAGNOSIS — U07.1 UPPER RESPIRATORY TRACT INFECTION DUE TO COVID-19 VIRUS: ICD-10-CM

## 2024-03-04 RX ORDER — BROMPHENIRAMINE MALEATE, PSEUDOEPHEDRINE HYDROCHLORIDE, AND DEXTROMETHORPHAN HYDROBROMIDE 2; 30; 10 MG/5ML; MG/5ML; MG/5ML
5 SYRUP ORAL 4 TIMES DAILY PRN
Qty: 118 ML | Refills: 0 | Status: SHIPPED | OUTPATIENT
Start: 2024-03-04

## 2024-03-04 RX ORDER — AZITHROMYCIN 250 MG/1
TABLET, FILM COATED ORAL
Qty: 6 TABLET | Refills: 0 | Status: SHIPPED | OUTPATIENT
Start: 2024-03-04

## 2024-03-04 RX ORDER — FLUTICASONE FUROATE, UMECLIDINIUM BROMIDE AND VILANTEROL TRIFENATATE 100; 62.5; 25 UG/1; UG/1; UG/1
1 POWDER RESPIRATORY (INHALATION)
Qty: 14 EACH | Refills: 0 | Status: SHIPPED | OUTPATIENT
Start: 2024-03-04

## 2024-03-04 RX ORDER — METHYLPREDNISOLONE ACETATE 80 MG/ML
80 INJECTION, SUSPENSION INTRA-ARTICULAR; INTRALESIONAL; INTRAMUSCULAR; SOFT TISSUE ONCE
Status: COMPLETED | OUTPATIENT
Start: 2024-03-04 | End: 2024-03-04

## 2024-03-04 RX ORDER — METHENAMINE, SODIUM PHOSPHATE, MONOBASIC, MONOHYDRATE, PHENYL SALICYLATE, METHYLENE BLUE, AND HYOSCYAMINE SULFATE 118; 40.8; 36; 10; .12 MG/1; MG/1; MG/1; MG/1; MG/1
CAPSULE ORAL
COMMUNITY
Start: 2024-02-21

## 2024-03-04 RX ORDER — DEXAMETHASONE 0.5 MG/1
TABLET ORAL
Qty: 21 TABLET | Refills: 0 | Status: SHIPPED | OUTPATIENT
Start: 2024-03-04

## 2024-03-04 RX ORDER — CEFTRIAXONE 1 G/1
1 INJECTION, POWDER, FOR SOLUTION INTRAMUSCULAR; INTRAVENOUS ONCE
Status: COMPLETED | OUTPATIENT
Start: 2024-03-04 | End: 2024-03-04

## 2024-03-04 RX ADMIN — CEFTRIAXONE 1 G: 1 INJECTION, POWDER, FOR SOLUTION INTRAMUSCULAR; INTRAVENOUS at 16:24

## 2024-03-04 RX ADMIN — METHYLPREDNISOLONE ACETATE 80 MG: 80 INJECTION, SUSPENSION INTRA-ARTICULAR; INTRALESIONAL; INTRAMUSCULAR; SOFT TISSUE at 16:24

## 2024-03-04 NOTE — PROGRESS NOTES
Established Patient        Chief Complaint:   Chief Complaint   Patient presents with    Illness     Pt states she has had covid induced bronchitis. Still dealing with a bad cough, feels it in her chest, throat feels swollen,double ear pain, sore throat. Been going on since Tuesday 2/27.        Edyta Franco is a 53 y.o. female    History of Present Illness:   Answers submitted by the patient for this visit:  Primary Reason for Visit (Submitted on 3/1/2024)  What is the primary reason for your visit?: Cough  Cough Questionnaire (Submitted on 3/1/2024)  Chief Complaint: Cough  Chronicity: new  Onset: in the past 7 days  Progression since onset: unchanged  Frequency: every few minutes  Cough characteristics: productive of yellow sputum  ear congestion: Yes  heartburn: No  hemoptysis: No  nasal congestion: Yes  sweats: Yes  weight loss: No  Aggravated by: nothing    Covid diagnosis 1 week ago.    Continues to have significant upper airway congestion, bronchospasm resulting in coughing spells; produces yellow/green phlegm at times.  Tolerating all p.o. intake.  Denies any palpitations or syncope.  Denies any active chest pain.  Maintains good urine output.  No epistaxis or hemoptysis.  Subjective     The following portions of the patient's history were reviewed and updated as appropriate: allergies, current medications, past family history, past medical history, past social history, past surgical history and problem list.    Allergies   Allergen Reactions    Penicillins Hives    Ceftriaxone Other (See Comments)       Review of Systems  Constitutional: Negative for fever. Negative for chills, diaphoresis; mild malaise/fatigue  HENT: No dysphagia; no changes to vision/hearing/smell/taste; no epistaxis.  Heavy nasal congestion.  Eyes: Negative for redness and visual disturbance.   Respiratory: Cough as per above.  Cardiovascular: Negative for chest pain and palpitations.   Gastrointestinal:  "Negative for abdominal distention, abdominal pain and blood in stool.   Endocrine: Negative for cold intolerance and heat intolerance.   Genitourinary: Negative for difficulty urinating, dysuria and frequency.   Musculoskeletal: Negative for arthralgias, back pain and myalgias.   Skin: Negative for color change, rash and wound.   Neurological: Negative for syncope, weakness and headaches.   Hematological: Negative for adenopathy. Does not bruise/bleed easily.   Psychiatric/Behavioral: Negative for confusion. The patient is not nervous/anxious.    Objective     Physical Exam   Vital Signs: Pulse 83   Ht 154.9 cm (61\")   Wt 70.8 kg (156 lb)   LMP  (LMP Unknown)   SpO2 92%   BMI 29.48 kg/m²       Patient's (Body mass index is 29.48 kg/m².) indicates that they are overweight (BMI 25-29.9) with health related conditions that include dyslipidemias . Weight is unchanged. BMI is is above average; BMI management plan is completed. We discussed portion control and increasing exercise.      General Appearance: alert, oriented x 3, no acute distress.  Mildly ill-appearing, however remains pleasant and interactive during questioning/examination.  Skin: warm and dry.   HEENT: Atraumatic.  pupils round and reactive to light and accommodation, oral mucosa pink and moist.  Nares patent without epistaxis.  External auditory canals are patent tympanic membranes intact.  Heavy nasal congestion, boggy/inflamed nasal turbinates; moderate postnasal drainage with diffuse erythema to the posterior pharynx.  Neck: supple, no JVD, trachea midline.  No thyromegaly  Lungs: Rhonchus, referred, upper airway congestion that is partially cleared with cough, unlabored breathing effort.  Audible air exchange noted all lung fields.  Heart: RRR, normal S1 and S2, no S3, no rub.  Abdomen: soft, non-tender, no palpable bladder, present bowel sounds to auscultation ×4.  No guarding or rigidity.  Extremities: no clubbing, cyanosis or edema.  Good " range of motion actively and passively.  Symmetric muscle strength and development  Neuro: normal speech and mental status.  Cranial nerves II through XII intact.  No anosmia. DTR 2+; proprioception intact.  No focal motor/sensory deficits.    Advance Care Planning   ACP discussion was held with the patient during this visit. Patient does not have an advance directive, information provided.       Assessment and Plan      Assessment/Plan:   Diagnoses and all orders for this visit:    1. Hypertension, essential (Primary)    2. Upper respiratory tract infection due to COVID-19 virus  -     azithromycin (Zithromax Z-Saravanan) 250 MG tablet; Take 2 tablets by mouth on day 1, then 1 tablet daily on days 2-5  Dispense: 6 tablet; Refill: 0  -     dexAMETHasone (DECADRON) 0.5 MG tablet; 6 po x1d; then 5 po x 1d; then 4 po x 1d; then 3 po x 1d; then 2 po x 1d; then 1 po x1d; then STOP  Dispense: 21 tablet; Refill: 0  -     methylPREDNISolone acetate (DEPO-medrol) injection 80 mg  -     Fluticasone-Umeclidin-Vilant (Trelegy Ellipta) 100-62.5-25 MCG/ACT inhaler; Inhale 1 puff Daily.  Dispense: 14 each; Refill: 0  -     brompheniramine-pseudoephedrine-DM 30-2-10 MG/5ML syrup; Take 5 mL by mouth 4 (Four) Times a Day As Needed for Congestion or Cough.  Dispense: 118 mL; Refill: 0  -     cefTRIAXone (ROCEPHIN) injection 1 g      Patient is given 1 g IM injection of ceftriaxone here.    Patient also given an 80 mg IM injection of Depo-Medrol.  She is provided a sample of Trelegy, 1 inhalation daily.    Bromfed for as needed use.    Patient is also provided with a course of Zithromax, as well as a 6-day low-dose dexamethasone taper.    Vital signs demonstrate hemodynamic stability.      Discussion Summary:    Discussed plan of care in detail with pt today; pt verb understanding and agrees.    I spent 30 minutes caring for Edyta on this date of service. This time includes time spent by me in the following activities:preparing for the  visit, performing a medically appropriate examination and/or evaluation , counseling and educating the patient/family/caregiver, ordering medications, tests, or procedures, documenting information in the medical record, and care coordination    I have reviewed and updated all copied forward information, as appropriate.  I attest to the accuracy and relevance of any unchanged information.    Follow up:  No follow-ups on file.     There are no Patient Instructions on file for this visit.    Bill Albrecht DO  03/04/24  15:36 EST

## 2024-03-11 ENCOUNTER — PRIOR AUTHORIZATION (OUTPATIENT)
Dept: FAMILY MEDICINE CLINIC | Facility: CLINIC | Age: 54
End: 2024-03-11
Payer: COMMERCIAL

## 2024-03-11 ENCOUNTER — TELEPHONE (OUTPATIENT)
Dept: FAMILY MEDICINE CLINIC | Facility: CLINIC | Age: 54
End: 2024-03-11

## 2024-03-11 NOTE — TELEPHONE ENCOUNTER
(Key: [id number])    You have been denied for Trelegy. I will see what the doctor says about what to do next,    BEN Upton sent to insurance for Trelegy  Waiting for reply.    Tatiana Pereyra CMA

## 2024-03-11 NOTE — TELEPHONE ENCOUNTER
Caller: Edyta Franco    Relationship: Self    Best call back number: [phone]     What was the call regarding:   PATIENT WAS SEEN IN THE OFFICE ON 03/04/2024 AND HAS FINISHED THE MEDICATION THAT WAS PRESCRIBED AND IS FEELING A LITTLE BETTER BUT STILL HAS A LINGERING COUGH WITH CONGESTION AND FLUID IN BOTH EAR'S AND FEELING VERY FATIGUE WOULD LIKE TO BE INFORMED ON WHAT THE NEXT STEP WOULD BE TO GETTING BETTER PRE FABIAN MICHAEL DO

## 2024-03-20 DIAGNOSIS — I10 HYPERTENSION, ESSENTIAL: ICD-10-CM

## 2024-03-20 DIAGNOSIS — I87.2 VENOUS INSUFFICIENCY OF BOTH LOWER EXTREMITIES: ICD-10-CM

## 2024-03-20 DIAGNOSIS — I10 HYPERTENSION, ESSENTIAL, BENIGN: ICD-10-CM

## 2024-03-20 RX ORDER — HYDROCHLOROTHIAZIDE 12.5 MG/1
TABLET ORAL
Qty: 90 TABLET | Refills: 0 | Status: SHIPPED | OUTPATIENT
Start: 2024-03-20

## 2024-03-20 RX ORDER — LISINOPRIL 20 MG/1
TABLET ORAL
Qty: 90 TABLET | Refills: 0 | Status: SHIPPED | OUTPATIENT
Start: 2024-03-20

## 2024-03-20 RX ORDER — TORSEMIDE 10 MG/1
TABLET ORAL
Qty: 90 TABLET | Refills: 0 | Status: SHIPPED | OUTPATIENT
Start: 2024-03-20

## 2024-03-26 NOTE — PROGRESS NOTES
Edyta Franco  [id number]  1970      Reason for visit:  History of vulvar cancer, lichen sclerosus     History of present illness:  The patient is a 53 y.o. year old female who presents today for treatment and evaluation of the above issues.    Patient continues to see dermatology. She has now been on Plaquenil for 1 year. Dermatologist recently increased the dose to 400mg. Edyta recently had COVID and was sick for 3 weeks, feels she has been getting sick recently with increased Plaquenil dose. She stopped taking clobetasol, prednisone, and tacrolimus. She continues with desoximetasone. Most recent dermatology appointment, prescribed acitretin in Feb 2024.  She feels symptoms are overall stable.   Continues to struggle with lichen sclerosus, but no new vulvar lesions.     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     Oncologic History:  Oncology/Hematology History   Malignant neoplasm of vulva, unspecified   8/14/2017 Biopsy    History of lichens sclerosus, on clobetasol therapy. Pt presented to Dr. Abigail Fong with c/o painful ulcerative left vulvar lesion. Biopsy revealed invasive SCC, extending into deep margin at least 3.5 mm. Referred to Gyn Oncology     8/30/2017 Surgery    Modified radical vulvectomy, left inguinofemoral sentinel lymph node dissection, right inguinofemoral lymph node dissection, and mapping biopsies of the vulva. Surgery by Dr. Nassar and Dr. Bree Russell.     Pathology revealed SCC, focally keratinizing with 0.3 cm invasion. LVSI negative, lymph nodes negative, mapping biopsies negative. Stage IB grade 2       9/24/2017 - 9/27/2017 Other Event    Inpatient hospital admission for inguinal incision cellulitis, inguinal fluid collection, and SIRS. Treated with IV antibiotics and sent home on oral antibiotics.      10/26/2017 - 10/27/2017 Other Event    2 day inpatient admission for recurrent cellulitis, resolved with antibiotic management            Past Medical History:   Diagnosis Date    Epilepsy 2020    Patient reported she was taken off her medication for this apx 10 years ago.  Reported no seizure activity for apx. 8 years ago.      Fatigue     Hypercholesteremia     Hypertension     Lichen sclerosus of female genitalia     Rectal bleeding 2020    Vulva cancer     Wears contact lenses 2020    Patient advised contacts will need to be removed DOS       Past Surgical History:   Procedure Laterality Date     SECTION      COLONOSCOPY N/A 2020    Procedure: COLONOSCOPY WITH BIOPSY;  Surgeon: Eleni Russell MD;  Location: [facility];  Service: Gastroenterology;  Laterality: N/A;    HAND SURGERY Right 2014    LUMBAR LAMINECTOMY  2011    HEMILAMI RT-C7-T1, RT C7-T1 MEDIAL FACET & CT-T1 DISC (Dr. Cabrera    LYMPH NODE BIOPSY      ORIF ANKLE FRACTURE Right     TOTAL ABDOMINAL HYSTERECTOMY WITH SALPINGO OOPHORECTOMY      Dr. Richard Armendariz - [facility]     VULVECTOMY N/A 2017    Procedure: MODIFIED RADICAL VULVECTOMY, BILATERAL SENTINAL LYMPH NODE DISSECTION, MAPPING BIOPSIES OF VULVA;  Surgeon: Isabela Nassar MD;  Location: [facility] OR;  Service:        MEDICATIONS: The current medication list was reviewed with the patient and updated in the EMR this date per the Medical Assistant. Medication dosages and frequencies were confirmed to be accurate.      Allergies:  is allergic to penicillins and ceftriaxone.    Social History:   Social History     Socioeconomic History    Marital status:     Number of children: 2   Tobacco Use    Smoking status: Never     Passive exposure: Never    Smokeless tobacco: Never   Vaping Use    Vaping status: Never Used   Substance and Sexual Activity    Alcohol use: Yes     Alcohol/week: 1.0 standard drink of alcohol     Types: 1 Glasses of wine per week     Comment: 2x month    Drug use: Never    Sexual activity: Yes     Partners: Male     Birth control/protection:  "Surgical       Family History:    Family History   Problem Relation Age of Onset    Diabetes type II Father     Congenital heart disease Father     Hypertension Father     Hyperlipidemia Father     Diabetes Father     Heart disease Father     Ovarian cancer Mother     Diabetes type II Mother     Congenital heart disease Mother     Hypertension Mother     Stroke Mother     Hyperlipidemia Mother     Arthritis Mother     Heart disease Mother     Heart attack Other     Hyperlipidemia Other     Breast cancer Neg Hx     Colon cancer Neg Hx     Uterine cancer Neg Hx        Health Maintenance:    Health Maintenance   Topic Date Due    Pneumococcal Vaccine 0-64 (1 of 2 - PCV) Never done    HEPATITIS C SCREENING  Never done    ZOSTER VACCINE (1 of 2) Never done    ANNUAL PHYSICAL  02/25/2021    COVID-19 Vaccine (3 - 2023-24 season) 09/01/2023    LIPID PANEL  12/30/2023    MAMMOGRAM  06/22/2024    INFLUENZA VACCINE  03/31/2024 (Originally 8/1/2023)    PAP SMEAR  05/25/2024    BMI FOLLOWUP  03/04/2025    DXA SCAN  06/06/2025    TDAP/TD VACCINES (2 - Td or Tdap) 03/01/2030    COLORECTAL CANCER SCREENING  03/23/2030     Review of Systems  Please refer to HPI, remainder of ROS negative.    Physical Exam  Vitals:    03/27/24 1135   BP: 138/70   Pulse: 56   Resp: 18   Temp: 97.1 °F (36.2 °C)   TempSrc: Temporal   SpO2: 99%   Weight: 71.9 kg (158 lb 8 oz)   Height: 154.9 cm (61\")   PainSc: 0-No pain     Body mass index is 29.95 kg/m².  Wt Readings from Last 3 Encounters:   03/27/24 71.9 kg (158 lb 8 oz)   03/04/24 70.8 kg (156 lb)   02/27/24 70.8 kg (156 lb)     GENERAL: Alert, well-appearing female appearing her stated age who is in no apparent distress.    HEENT: Sclera anicteric. Head normocephalic, atraumatic.  CARDIOVASCULAR:  no peripheral edema.  RESPIRATORY:normal respiratory effort  SKIN:  Warm, dry, well-perfused.  All visible areas intact.  No rashes, lesions, ulcers.  PSYCHIATRIC: AO x3, with appropriate affect, normal " "thought processes.  NEUROLOGIC: No focal deficits.  Moves extremities well.  MUSCULOSKELETAL: Normal gait and station.   EXTREMITIES:   No cyanosis, clubbing, symmetric.     PELVIC exam:    External genitalia consistent with diffuse lichen sclerosis.  Thickened area at base of left labia minora and anterior vaginal introitus, stable.  Continues to have skin thinning and excoriations, overall stable exam.     ECOG PS 0    PROCEDURES: None    Diagnostic Data:      Lab Results   Component Value Date    WBC 10.45 01/06/2023    HGB 14.1 01/06/2023    HCT 41.3 01/06/2023    MCV 89.8 01/06/2023     01/06/2023    NEUTROABS 7.60 (H) 01/06/2023    GLUCOSE 169 (H) 01/06/2023    BUN 13 01/06/2023    CREATININE 0.75 01/06/2023    EGFRIFNONA 77 10/18/2021    EGFRIFAFRI 84 10/28/2020     01/06/2023    K 4.0 01/06/2023     01/06/2023    CO2 24.7 01/06/2023    MG 2.1 01/06/2023    CALCIUM 9.4 01/06/2023    ALBUMIN 4.3 01/06/2023    AST 18 01/06/2023    ALT 19 01/06/2023    BILITOT 0.2 01/06/2023     No results found for: \"\"      Assessment & Plan   This is a 53 y.o. woman with history of vulvar cancer, diffuse lichen sclerosis.    Encounter Diagnoses   Name Primary?    Malignant neoplasm of vulva, unspecified Yes    Lichen sclerosus et atrophicus    -Status post intradermal steroid injection without significant improvement.  -Previously recommended topical compound including ketamine and lidocaine, but patient did not take it because of increased skin burning.   - Has previously tried clobetasol, prednisone, and tacrolimus.  - Currently on plaquenil, recent dose increase and desoximetasone. Most recent dermatology appointment, prescribed acitretin in Feb 2024.  - She notes stable symptoms. Stable exam today   - Discussed that we recommend she continue with dermatologist recommendations. She may need repeat biopsies in the future, but no signs of cancer recurrence at this time.  -Dermatologist possibly is " going to refer her to a special vulvar[place]aginal dermatology [place]   -She will follow up in 6  months    Pain assessment was performed today as a part of patient’s care.  For patients with pain related to surgery, gynecologic malignancy or cancer treatment, the plan is as noted in the assessment/plan.  For patients with pain not related to these issues, they are to seek any further needed care from a more appropriate provider, such as PCP.      No orders of the defined types were placed in this encounter.    FOLLOW UP: 6 months    Karolina Laureano MD   PGY3- OBGYN Resident   [facility]  I spent 23 minutes caring for Edyta on this date of service. This time includes time spent by me in the following activities: preparing for the visit, reviewing tests, performing a medically appropriate examination and/or evaluation, counseling and educating the patient/family/caregiver, ordering medications, tests, or procedures, referring and communicating with other health care professionals, documenting information in the medical record, and care coordination    Patient was seen and examined with Dr. Shah,  resident, who performed portions of the examination and documentation for this patient's care under my direct supervision.  I agree with the above documentation and plan.    Isabela Nassar MD  03/31/24  16:01 EDT

## 2024-03-27 ENCOUNTER — OFFICE VISIT (OUTPATIENT)
Dept: GYNECOLOGIC ONCOLOGY | Facility: CLINIC | Age: 54
End: 2024-03-27
Payer: COMMERCIAL

## 2024-03-27 VITALS
DIASTOLIC BLOOD PRESSURE: 70 MMHG | RESPIRATION RATE: 18 BRPM | OXYGEN SATURATION: 99 % | TEMPERATURE: 97.1 F | BODY MASS INDEX: 29.92 KG/M2 | HEIGHT: 61 IN | HEART RATE: 56 BPM | SYSTOLIC BLOOD PRESSURE: 138 MMHG | WEIGHT: 158.5 LBS

## 2024-03-27 DIAGNOSIS — L90.0 LICHEN SCLEROSUS ET ATROPHICUS: ICD-10-CM

## 2024-03-27 DIAGNOSIS — C51.9 MALIGNANT NEOPLASM OF VULVA, UNSPECIFIED: Primary | ICD-10-CM

## 2024-03-27 PROCEDURE — 99213 OFFICE O/P EST LOW 20 MIN: CPT | Performed by: OBSTETRICS & GYNECOLOGY

## 2024-05-06 ENCOUNTER — TELEPHONE (OUTPATIENT)
Dept: FAMILY MEDICINE CLINIC | Facility: CLINIC | Age: 54
End: 2024-05-06
Payer: COMMERCIAL

## 2024-06-04 DIAGNOSIS — I10 HYPERTENSION, ESSENTIAL, BENIGN: ICD-10-CM

## 2024-06-04 DIAGNOSIS — I87.2 VENOUS INSUFFICIENCY OF BOTH LOWER EXTREMITIES: ICD-10-CM

## 2024-06-04 RX ORDER — TORSEMIDE 10 MG/1
TABLET ORAL
Qty: 90 TABLET | Refills: 0 | Status: SHIPPED | OUTPATIENT
Start: 2024-06-04

## 2024-06-11 DIAGNOSIS — I10 HYPERTENSION, ESSENTIAL: ICD-10-CM

## 2024-06-11 RX ORDER — HYDROCHLOROTHIAZIDE 12.5 MG/1
TABLET ORAL
Qty: 90 TABLET | Refills: 0 | Status: SHIPPED | OUTPATIENT
Start: 2024-06-11

## 2024-06-11 RX ORDER — LISINOPRIL 20 MG/1
TABLET ORAL
Qty: 90 TABLET | Refills: 0 | Status: SHIPPED | OUTPATIENT
Start: 2024-06-11

## 2024-06-18 ENCOUNTER — OFFICE VISIT (OUTPATIENT)
Dept: OBSTETRICS AND GYNECOLOGY | Facility: CLINIC | Age: 54
End: 2024-06-18
Payer: COMMERCIAL

## 2024-06-18 VITALS
WEIGHT: 164 LBS | BODY MASS INDEX: 30.96 KG/M2 | SYSTOLIC BLOOD PRESSURE: 100 MMHG | HEIGHT: 61 IN | DIASTOLIC BLOOD PRESSURE: 78 MMHG

## 2024-06-18 DIAGNOSIS — C51.9 MALIGNANT NEOPLASM OF VULVA, UNSPECIFIED: ICD-10-CM

## 2024-06-18 DIAGNOSIS — L90.0 LICHEN SCLEROSUS ET ATROPHICUS: ICD-10-CM

## 2024-06-18 DIAGNOSIS — Z01.419 WOMEN'S ANNUAL ROUTINE GYNECOLOGICAL EXAMINATION: Primary | ICD-10-CM

## 2024-06-18 PROCEDURE — 99396 PREV VISIT EST AGE 40-64: CPT | Performed by: OBSTETRICS & GYNECOLOGY

## 2024-06-18 RX ORDER — ESTRADIOL 0.5 MG/1
0.5 TABLET ORAL DAILY
Qty: 90 TABLET | Refills: 3 | Status: SHIPPED | OUTPATIENT
Start: 2024-06-18

## 2024-06-18 NOTE — PROGRESS NOTES
Gynecologic Annual Exam Note        GYN Annual Exam     CC - Here for annual exam.        HPI  Edyta Franco is a 53 y.o. female, , who presents for annual well woman exam as a established patient.  She is postmenopausal.. Denies vaginal bleeding.   There were no changes to her medical or surgical history since her last visit. Marital Status: .  She is sexually active. She has not had new partners.. STD testing recommendations have been explained to the patient and she declines STD testing.    The patient would like to discuss the following complaints today: the patient was referred to Dr Ruffin for Lichens Sclerosis and they are having trouble getting the Lichens under control. The patient is being referred to [facility] and she wonders if a Dee Nette would help. She has questions about her medications. Dr Ruffin is trying to shut down her immune system. She had a vulvar biopsy 2 weeks ago and is very tender and may want to push back her pap smear today.  She also recently had urethral dilation by dr garber bc lichens is causing stricture of urethra    Additional OB/GYN History   On HRT? Yes. Details: estrace cream and estradiol 5mg tablets    Last Pap : 21. Results: negative. HPV: unknown .   Last Completed Pap Smear       This patient has no relevant Health Maintenance data.          History of abnormal Pap smear: no  Family history of uterine, colon, breast, or ovarian cancer: yes - mother with uterine cancer  Performs monthly Self-Breast Exam: yes  Last mammogram: 22. Done at [facility] negative. There is a copy in the chart.    Last Completed Mammogram       This patient has no relevant Health Maintenance data.          Last colonoscopy: has had a colonoscopy 3 years ago wnl RTO 10yrs    Last Completed Colonoscopy            COLORECTAL CANCER SCREENING (COLONOSCOPY - Every 10 Years) Next due on 3/23/2030      2020  Surgical Procedure: COLONOSCOPY    2020  COLONOSCOPY                     Her last bone density scan was 1 year ago and results were Normal  Exercises Regularly: no  Feelings of Anxiety or Depression: no      Tobacco Usage?: No       Current Outpatient Medications:     estradiol (ESTRACE) 0.5 MG tablet, Take 1 tablet by mouth Daily., Disp: 90 tablet, Rfl: 3    brompheniramine-pseudoephedrine-DM 30-2-10 MG/5ML syrup, Take 5 mL by mouth 4 (Four) Times a Day As Needed for Congestion or Cough., Disp: 118 mL, Rfl: 0    estradiol (ESTRACE VAGINAL) 0.1 MG/GM vaginal cream, Insert 1 g into the vagina Daily., Disp: 1 each, Rfl: 12    fenofibrate 160 MG tablet, Take 1 tablet by mouth Daily., Disp: 90 tablet, Rfl: 2    hydroCHLOROthiazide 12.5 MG tablet, Take 1 tablet by mouth once daily, Disp: 90 tablet, Rfl: 0    hydroxychloroquine (PLAQUENIL) 200 MG tablet, , Disp: , Rfl:     lisinopril (PRINIVIL,ZESTRIL) 20 MG tablet, Take 1 tablet by mouth once daily, Disp: 90 tablet, Rfl: 0    pravastatin (PRAVACHOL) 20 MG tablet, Take 1 tablet by mouth Daily., Disp: 90 tablet, Rfl: 3    torsemide (DEMADEX) 10 MG tablet, Take 1 tablet by mouth once daily, Disp: 90 tablet, Rfl: 0    uribel (URO-MP) 118 MG capsule capsule, Take 1 capsule 4 times a day by oral route as needed., Disp: , Rfl:     Patient is requesting refills of estradiol.    OB History          2    Para   2    Term   2            AB        Living   2         SAB        IAB        Ectopic        Molar        Multiple        Live Births   2                Past Medical History:   Diagnosis Date    Epilepsy 2020    Patient reported she was taken off her medication for this apx 10 years ago.  Reported no seizure activity for apx. 8 years ago.      Fatigue     Hypercholesteremia     Hypertension     Lichen sclerosus of female genitalia     Rectal bleeding 2020    Varicella 1974    Vulva cancer     Vulvar cancer 2017    Wears contact lenses 2020    Patient advised contacts will need to be removed DOS  "       Past Surgical History:   Procedure Laterality Date     SECTION      COLONOSCOPY N/A 2020    Procedure: COLONOSCOPY WITH BIOPSY;  Surgeon: Eleni Russell MD;  Location: [facility] ENDOSCOPY;  Service: Gastroenterology;  Laterality: N/A;    HAND SURGERY Right 2014    LUMBAR LAMINECTOMY  2011    HEMILAMI RT-C7-T1, RT C7-T1 MEDIAL FACET & CT-T1 DISC (Dr. Cabrera    LYMPH NODE BIOPSY      ORIF ANKLE FRACTURE Right     TOTAL ABDOMINAL HYSTERECTOMY WITH SALPINGO OOPHORECTOMY      [facility] Meneses     VULVA SURGERY  2024    biopsy  for Lichens    VULVECTOMY N/A 2017    Procedure: MODIFIED RADICAL VULVECTOMY, BILATERAL SENTINAL LYMPH NODE DISSECTION, MAPPING BIOPSIES OF VULVA;  Surgeon: Isabela Nassar MD;  Location: [facility] OR;  Service:        Health Maintenance   Topic Date Due    Pneumococcal Vaccine 0-64 (1 of 2 - PCV) Never done    HEPATITIS C SCREENING  Never done    ZOSTER VACCINE (1 of 2) Never done    ANNUAL PHYSICAL  2021    COVID-19 Vaccine (3 - - season) 2023    LIPID PANEL  2023    PAP SMEAR  2024    Annual Gynecologic Pelvic and Breast Exam  2024    MAMMOGRAM  2024    INFLUENZA VACCINE  2024    BMI FOLLOWUP  2025    DXA SCAN  2025    TDAP/TD VACCINES (2 - Td or Tdap) 2030    COLORECTAL CANCER SCREENING  2030       The additional following portions of the patient's history were reviewed and updated as appropriate: allergies, current medications, past family history, past medical history, past social history, past surgical history, and problem list.    Review of Systems   All other systems reviewed and are negative.      I have reviewed and agree with the HPI, ROS, and historical information as entered above. Abigail Fong MD      Objective   /78   Ht 154.9 cm (61\")   Wt 74.4 kg (164 lb)   LMP  (LMP Unknown)   BMI 30.99 kg/m²     Physical Exam  Vitals and nursing note " reviewed. Exam conducted with a chaperone present.   Constitutional:       Appearance: Normal appearance.   HENT:      Head: Normocephalic and atraumatic.   Eyes:      Pupils: Pupils are equal, round, and reactive to light.   Pulmonary:      Effort: Pulmonary effort is normal.      Breath sounds: Normal breath sounds.   Chest:      Chest wall: No tenderness.   Breasts:     Right: No inverted nipple, mass, nipple discharge, skin change or tenderness.      Left: No inverted nipple, mass, nipple discharge, skin change or tenderness.   Abdominal:      General: There is no distension.      Palpations: Abdomen is soft.      Tenderness: There is no abdominal tenderness.   Musculoskeletal:         General: Normal range of motion.      Cervical back: Normal range of motion and neck supple.      Right lower leg: No edema.      Left lower leg: No edema.   Lymphadenopathy:      Upper Body:      Right upper body: No axillary adenopathy.      Left upper body: No axillary adenopathy.   Skin:     General: Skin is warm and dry.   Neurological:      General: No focal deficit present.      Mental Status: She is alert and oriented to person, place, and time.   Psychiatric:         Mood and Affect: Mood normal.         Behavior: Behavior normal. Behavior is cooperative.     Ext genetalia only examined. +lichen sclerosis, with 2 areas of skin breakdown, biopsy sites seen.        Assessment and Plan    Problem List Items Addressed This Visit          Gynecologic and Obstetric Problems    Malignant neoplasm of vulva, unspecified       Other    Lichen sclerosus et atrophicus     Other Visit Diagnoses       Women's annual routine gynecological examination    -  Primary    Relevant Orders    LIQUID-BASED PAP SMEAR WITH HPV GENOTYPING IF ASCUS ([facility])    Mammo Screening Digital Tomosynthesis Bilateral With CAD            GYN annual well woman exam. Refill her estrace. She has referral pending to vulvar clinic at [facility].   Reviewed  monthly self breast exams.  Instructed to call with lumps, pain, or breast discharge.  Yearly mammograms ordered.  Recommended use of Vitamin D and getting adequate calcium in her diet. (1500mg)  RTC in 1 year or PRN with problems.  Return in about 1 year (around 6/18/2025) for Annual physical.         Abigail Fong MD  06/18/2024

## 2024-08-06 ENCOUNTER — OFFICE VISIT (OUTPATIENT)
Dept: FAMILY MEDICINE CLINIC | Facility: CLINIC | Age: 54
End: 2024-08-06
Payer: COMMERCIAL

## 2024-08-06 VITALS
HEIGHT: 61 IN | WEIGHT: 171 LBS | HEART RATE: 69 BPM | BODY MASS INDEX: 32.28 KG/M2 | DIASTOLIC BLOOD PRESSURE: 92 MMHG | SYSTOLIC BLOOD PRESSURE: 122 MMHG | OXYGEN SATURATION: 97 %

## 2024-08-06 DIAGNOSIS — E78.5 DYSLIPIDEMIA: Primary | Chronic | ICD-10-CM

## 2024-08-06 DIAGNOSIS — R73.01 IMPAIRED FASTING GLUCOSE: ICD-10-CM

## 2024-08-06 DIAGNOSIS — Z13.820 SCREENING FOR OSTEOPOROSIS: ICD-10-CM

## 2024-08-06 DIAGNOSIS — N95.9 POSTMENOPAUSAL SYMPTOMS: ICD-10-CM

## 2024-08-06 DIAGNOSIS — Z79.52 LONG TERM SYSTEMIC STEROID USER: ICD-10-CM

## 2024-08-06 LAB
EXPIRATION DATE: NORMAL
HBA1C MFR BLD: 5.4 % (ref 4.5–5.7)
Lab: NORMAL

## 2024-08-06 PROCEDURE — 99214 OFFICE O/P EST MOD 30 MIN: CPT | Performed by: FAMILY MEDICINE

## 2024-08-06 PROCEDURE — 83036 HEMOGLOBIN GLYCOSYLATED A1C: CPT | Performed by: FAMILY MEDICINE

## 2024-08-06 RX ORDER — METHOTREXATE 2.5 MG/1
TABLET ORAL
COMMUNITY
Start: 2024-05-01

## 2024-08-06 RX ORDER — PREDNISONE 10 MG/1
TABLET ORAL
COMMUNITY
Start: 2024-07-10

## 2024-08-06 NOTE — PROGRESS NOTES
Established Patient        Chief Complaint:   Chief Complaint   Patient presents with    Follow-up     Discuss imaging, medication        Edyta Franco is a 54 y.o. female    History of Present Illness:   Answers submitted by the patient for this visit:  Other (Submitted on 8/4/2024)  Please describe your symptoms.: My dermatologist recommended I come for bone density after being on multiple rounds of prednisone. My blood sugar has been elevated the last few times my blood was drawn.  Have you had these symptoms before?: Yes  How long have you been having these symptoms?: Greater than 2 weeks  Primary Reason for Visit (Submitted on 8/4/2024)  What is the primary reason for your visit?: Other    History of Present Illness  The patient presents for evaluation of multiple medical concerns.    The patient was referred here by her dermatologist due to recurrent issues with lichen sclerosus. Over the past 4 to 5 times, she has observed elevated blood glucose levels, all of which occurred in a fasting state. She has been on multiple rounds of prednisone, which may have contributed to her elevated blood glucose levels. Her dermatologist also recommended a bone density scan.    Supplemental Information  She had a hysterectomy 25 years ago.         Subjective     The following portions of the patient's history were reviewed and updated as appropriate: allergies, current medications, past family history, past medical history, past social history, past surgical history and problem list.    Allergies   Allergen Reactions    Penicillins Hives       Review of Systems  Constitutional: Negative for fever. Negative for chills, diaphoresis; mild malaise/fatigue  HENT: No dysphagia; no changes to vision/hearing/smell/taste; no epistaxis.  Heavy nasal congestion.  Eyes: Negative for redness and visual disturbance.   Respiratory: Cough as per above.  Cardiovascular: Negative for chest pain and palpitations.  "  Gastrointestinal: Negative for abdominal distention, abdominal pain and blood in stool.   Endocrine: Negative for cold intolerance and heat intolerance.   Genitourinary: Negative for difficulty urinating, dysuria and frequency.   Musculoskeletal: Negative for arthralgias, back pain and myalgias.   Skin: Negative for color change, rash and wound.   Neurological: Negative for syncope, weakness and headaches.   Hematological: Negative for adenopathy. Does not bruise/bleed easily.   Psychiatric/Behavioral: Negative for confusion. The patient is not nervous/anxious.    Objective     Physical Exam   Vital Signs: /92   Pulse 69   Ht 154.9 cm (61\")   Wt 77.6 kg (171 lb)   LMP  (LMP Unknown)   SpO2 97%   BMI 32.31 kg/m²       Patient's (Body mass index is 32.31 kg/m².) indicates that they are overweight (BMI 25-29.9) with health related conditions that include dyslipidemias . Weight is unchanged. BMI is is above average; BMI management plan is completed. We discussed portion control and increasing exercise.      General Appearance: alert, oriented x 3, no acute distress.  Mildly ill-appearing, however remains pleasant and interactive during questioning/examination.  Skin: warm and dry.   HEENT: Atraumatic.  pupils round and reactive to light and accommodation, oral mucosa pink and moist.  Nares patent without epistaxis.  External auditory canals are patent tympanic membranes intact.  Heavy nasal congestion, boggy/inflamed nasal turbinates; moderate postnasal drainage with diffuse erythema to the posterior pharynx.  Neck: supple, no JVD, trachea midline.  No thyromegaly  Lungs: Rhonchus, referred, upper airway congestion that is partially cleared with cough, unlabored breathing effort.  Audible air exchange noted all lung fields.  Heart: RRR, normal S1 and S2, no S3, no rub.  Abdomen: soft, non-tender, no palpable bladder, present bowel sounds to auscultation ×4.  No guarding or rigidity.  Extremities: no " clubbing, cyanosis or edema.  Good range of motion actively and passively.  Symmetric muscle strength and development  Neuro: normal speech and mental status.  Cranial nerves II through XII intact.  No anosmia. DTR 2+; proprioception intact.  No focal motor/sensory deficits.        Assessment and Plan      Assessment/Plan:   Diagnoses and all orders for this visit:    1. Dyslipidemia (Primary)    2. Long term systemic steroid user  -     Dexa Bone Density, Axial (Every 2 Years); Future    3. Impaired fasting glucose  -     POC Glycosylated Hemoglobin (Hb A1C)    4. Postmenopausal symptoms  -     Dexa Bone Density, Axial (Every 2 Years); Future    5. Screening for osteoporosis  -     Dexa Bone Density, Axial (Every 2 Years); Future    Continue low-cholesterol dietary intake.    Bone density study ordered today.  Longstanding steroid use.    Schedule follow-up appointment with dermatology.    Vital signs demonstrate hemodynamic stability.    Discussion Summary:    Discussed plan of care in detail with pt today; pt verb understanding and agrees.      Follow up:  No follow-ups on file.     There are no Patient Instructions on file for this visit.    Bill Albrecht DO          I spent 35 minutes caring for Edyta on this date of service. This time includes time spent by me in the following activities:preparing for the visit, performing a medically appropriate examination and/or evaluation , counseling and educating the patient/family/caregiver, ordering medications, tests, or procedures, documenting information in the medical record, and care coordination    I confirm accuracy of unchanged data/findings which have been carried forward from previous visit, as well as I have updated appropriately those that have changed.    Patient or patient representative verbalized consent for the use of Ambient Listening during the visit with  Bill Albrecht DO for chart documentation. 8/6/2024  23:10 EDT    Bill Albrecht,  DO  08/06/24  12:08 EDT

## 2024-08-07 ENCOUNTER — TELEPHONE (OUTPATIENT)
Dept: GYNECOLOGIC ONCOLOGY | Facility: CLINIC | Age: 54
End: 2024-08-07
Payer: COMMERCIAL

## 2024-08-07 NOTE — TELEPHONE ENCOUNTER
Caller: Edyta Franco    Relationship: Self    Best call back number: [phone]    Who are you requesting to speak with (clinical staff, provider,  specific staff member): MERVIN IN SCHEDULING    What was the call regarding: EDYTA CALLED TO SPEAK WITH MERVIN. SHE SAYS HER DOCTOR IN [place] FOUND PRE-CANCEROUS ON HER VULVA. SHE WAS TOLD TO CALL AND GET AN APPOINTMENT SCHEDULED. SHE SAYS THE DOCTOR IS SUPPOSED TO CALL DR. ALAMO TO DISCUSS. HER RESULTS HAVE BEEN PULLED THROUGH CARE EVERYWHERE.

## 2024-08-09 ENCOUNTER — TELEPHONE (OUTPATIENT)
Dept: GYNECOLOGIC ONCOLOGY | Facility: CLINIC | Age: 54
End: 2024-08-09
Payer: COMMERCIAL

## 2024-08-09 NOTE — TELEPHONE ENCOUNTER
Specialist from [place], Dr Julien, is trying to reach Dr Nassar to talk about most recent biopsies. Call back number is [phone]

## 2024-08-12 ENCOUNTER — TELEPHONE (OUTPATIENT)
Dept: GYNECOLOGIC ONCOLOGY | Facility: CLINIC | Age: 54
End: 2024-08-12
Payer: COMMERCIAL

## 2024-08-12 NOTE — TELEPHONE ENCOUNTER
I talked to Dr. Julien regarding patient's biopsy.  She is concerned about invasive cancer due to patient's ongoing pain.  I would anticipate this would be a larger resection with delayed wound healing given patient's previous procedures and outcomes.  Working with  to get her seen in a timely fashion.  Electronically signed by Isabela Nassar MD, 08/12/24, 12:41 PM EDT.

## 2024-08-20 ENCOUNTER — OFFICE VISIT (OUTPATIENT)
Dept: GYNECOLOGIC ONCOLOGY | Facility: CLINIC | Age: 54
End: 2024-08-20
Payer: COMMERCIAL

## 2024-08-20 ENCOUNTER — PREP FOR SURGERY (OUTPATIENT)
Dept: OTHER | Facility: HOSPITAL | Age: 54
End: 2024-08-20
Payer: COMMERCIAL

## 2024-08-20 VITALS
RESPIRATION RATE: 18 BRPM | SYSTOLIC BLOOD PRESSURE: 136 MMHG | HEIGHT: 61 IN | DIASTOLIC BLOOD PRESSURE: 82 MMHG | BODY MASS INDEX: 31.28 KG/M2 | HEART RATE: 64 BPM | WEIGHT: 165.7 LBS | OXYGEN SATURATION: 99 % | TEMPERATURE: 97.3 F

## 2024-08-20 DIAGNOSIS — L29.2 VULVAR PRURITUS: ICD-10-CM

## 2024-08-20 DIAGNOSIS — L90.0 LICHEN SCLEROSUS ET ATROPHICUS: ICD-10-CM

## 2024-08-20 DIAGNOSIS — D07.1 VIN III (VULVAR INTRAEPITHELIAL NEOPLASIA III): Primary | ICD-10-CM

## 2024-08-20 DIAGNOSIS — R10.2 VULVAR PAIN: ICD-10-CM

## 2024-08-20 PROCEDURE — 99214 OFFICE O/P EST MOD 30 MIN: CPT | Performed by: OBSTETRICS & GYNECOLOGY

## 2024-08-20 RX ORDER — SODIUM CHLORIDE 9 MG/ML
40 INJECTION, SOLUTION INTRAVENOUS AS NEEDED
OUTPATIENT
Start: 2024-08-20

## 2024-08-20 RX ORDER — PREGABALIN 150 MG/1
150 CAPSULE ORAL ONCE
OUTPATIENT
Start: 2024-08-20 | End: 2024-08-20

## 2024-08-20 RX ORDER — SODIUM CHLORIDE 0.9 % (FLUSH) 0.9 %
10 SYRINGE (ML) INJECTION AS NEEDED
OUTPATIENT
Start: 2024-08-20

## 2024-08-20 RX ORDER — ACETAMINOPHEN 500 MG
1000 TABLET ORAL ONCE
OUTPATIENT
Start: 2024-08-20 | End: 2024-08-20

## 2024-08-20 RX ORDER — CELECOXIB 200 MG/1
200 CAPSULE ORAL ONCE
OUTPATIENT
Start: 2024-08-20 | End: 2024-08-20

## 2024-08-20 RX ORDER — SODIUM CHLORIDE 0.9 % (FLUSH) 0.9 %
10 SYRINGE (ML) INJECTION EVERY 12 HOURS SCHEDULED
OUTPATIENT
Start: 2024-08-20

## 2024-08-20 RX ORDER — SCOLOPAMINE TRANSDERMAL SYSTEM 1 MG/1
1 PATCH, EXTENDED RELEASE TRANSDERMAL CONTINUOUS
OUTPATIENT
Start: 2024-08-20 | End: 2024-08-23

## 2024-08-20 NOTE — PROGRESS NOTES
Edyta Franco  [id number]  1970      Reason for visit:  History of vulvar cancer, lichen sclerosus     History of present illness:  The patient is a 54 y.o. year old female who presents today for treatment and evaluation of the above issues.    Patient continues to see dermatology. She has now been on Plaquenil for 1 year.  In the interim, patient went to [place] for evaluation and underwent biopsy there which showed an intradermal neoplasia.  She presents today for follow-up.  She continues to have discomfort and occasional bleeding from the vulva which is a chronic problem.  She notes bleeding from the biopsy site which is still not completely healed.    Oncologic History:  Oncology/Hematology History   Malignant neoplasm of vulva, unspecified   8/14/2017 Biopsy    History of lichens sclerosus, on clobetasol therapy. Pt presented to Dr. Abigail Fong with c/o painful ulcerative left vulvar lesion. Biopsy revealed invasive SCC, extending into deep margin at least 3.5 mm. Referred to Gyn Oncology     8/30/2017 Surgery    Modified radical vulvectomy, left inguinofemoral sentinel lymph node dissection, right inguinofemoral lymph node dissection, and mapping biopsies of the vulva. Surgery by Dr. Nassar and Dr. Bree Russell.     Pathology revealed SCC, focally keratinizing with 0.3 cm invasion. LVSI negative, lymph nodes negative, mapping biopsies negative. Stage IB grade 2       9/24/2017 - 9/27/2017 Other Event    Inpatient hospital admission for inguinal incision cellulitis, inguinal fluid collection, and SIRS. Treated with IV antibiotics and sent home on oral antibiotics.      10/26/2017 - 10/27/2017 Other Event    2 day inpatient admission for recurrent cellulitis, resolved with antibiotic management           Past Medical History:   Diagnosis Date    Epilepsy 03/20/2020    Patient reported she was taken off her medication for this apx 10 years ago.  Reported no seizure activity for apx. 8 years ago.       Fatigue     Hypercholesteremia     Hypertension     Lichen sclerosus of female genitalia     Rectal bleeding 2020    Varicella 1974    Vulva cancer     Vulvar cancer 2017    Wears contact lenses 2020    Patient advised contacts will need to be removed DOS       Past Surgical History:   Procedure Laterality Date     SECTION      COLONOSCOPY N/A 2020    Procedure: COLONOSCOPY WITH BIOPSY;  Surgeon: Eleni Russell MD;  Location: [facility] ENDOSCOPY;  Service: Gastroenterology;  Laterality: N/A;    HAND SURGERY Right 2014    LUMBAR LAMINECTOMY  2011    HEMILAMI RT-C7-T1, RT C7-T1 MEDIAL FACET & CT-T1 DISC (Dr. Cabrera    LYMPH NODE BIOPSY      ORIF ANKLE FRACTURE Right     TOTAL ABDOMINAL HYSTERECTOMY WITH SALPINGO OOPHORECTOMY      Dr. Richard Armendariz - [facility]     VULVA BIOPSY      VULVA SURGERY  2024    biopsy  for Lichens    VULVECTOMY N/A 2017    Procedure: MODIFIED RADICAL VULVECTOMY, BILATERAL SENTINAL LYMPH NODE DISSECTION, MAPPING BIOPSIES OF VULVA;  Surgeon: Isabela Nassar MD;  Location: [facility] OR;  Service:        MEDICATIONS: The current medication list was reviewed with the patient and updated in the EMR this date per the Medical Assistant. Medication dosages and frequencies were confirmed to be accurate.      Allergies:  is allergic to penicillins.    Social History:   Social History     Socioeconomic History    Marital status:     Number of children: 2   Tobacco Use    Smoking status: Never     Passive exposure: Never    Smokeless tobacco: Never   Vaping Use    Vaping status: Never Used   Substance and Sexual Activity    Alcohol use: Yes     Alcohol/week: 1.0 standard drink of alcohol     Types: 1 Glasses of wine per week     Comment: 2x month    Drug use: Never    Sexual activity: Yes     Partners: Male     Birth control/protection: Surgical       Family History:    Family History   Problem Relation Age of Onset    Diabetes type II Father   "   Congenital heart disease Father     Hypertension Father     Hyperlipidemia Father     Diabetes Father     Heart disease Father     Ovarian cancer Mother     Diabetes type II Mother     Congenital heart disease Mother     Hypertension Mother     Stroke Mother     Hyperlipidemia Mother     Arthritis Mother     Heart disease Mother     Heart attack Other     Hyperlipidemia Other     Breast cancer Neg Hx     Colon cancer Neg Hx     Uterine cancer Neg Hx        Health Maintenance:    Health Maintenance   Topic Date Due    Pneumococcal Vaccine 0-64 (1 of 2 - PCV) Never done    HEPATITIS C SCREENING  Never done    ZOSTER VACCINE (1 of 2) Never done    ANNUAL PHYSICAL  02/25/2021    COVID-19 Vaccine (3 - 2023-24 season) 09/01/2023    LIPID PANEL  12/30/2023    PAP SMEAR  05/25/2024    MAMMOGRAM  06/22/2024    INFLUENZA VACCINE  08/01/2024    DXA SCAN  06/06/2025    BMI FOLLOWUP  08/06/2025    TDAP/TD VACCINES (2 - Td or Tdap) 03/01/2030    COLORECTAL CANCER SCREENING  03/23/2030     Review of Systems  Please refer to HPI, remainder of ROS negative.    Physical Exam  Vitals:    08/20/24 1008   BP: 136/82   Pulse: 64   Resp: 18   Temp: 97.3 °F (36.3 °C)   TempSrc: Temporal   SpO2: 99%   Weight: 75.2 kg (165 lb 11.2 oz)   Height: 154.9 cm (60.98\")   PainSc:   5   PainLoc: Vagina  Comment: Vulva       Body mass index is 31.33 kg/m².  Wt Readings from Last 3 Encounters:   08/20/24 75.2 kg (165 lb 11.2 oz)   08/06/24 77.6 kg (171 lb)   06/18/24 74.4 kg (164 lb)     GENERAL: Alert, well-appearing female appearing her stated age who is in no apparent distress.    HEENT: Sclera anicteric. Head normocephalic, atraumatic.  CARDIOVASCULAR:  no peripheral edema.  RESPIRATORY:normal respiratory effort  SKIN:  Warm, dry, well-perfused.  All visible areas intact.  No rashes, lesions, ulcers.  PSYCHIATRIC: AO x3, with appropriate affect, normal thought processes.  NEUROLOGIC: No focal deficits.  Moves extremities well.  MUSCULOSKELETAL: " "Normal gait and station.   EXTREMITIES:   No cyanosis, clubbing, symmetric.     PELVIC exam:    Physical Exam  Genitourinary:      Genitourinary Comments: Areas of concern re: ARGENTINA/lichen sclerosis               ECOG PS 0    PROCEDURES: None    Diagnostic Data:      Lab Results   Component Value Date    WBC 10.45 01/06/2023    HGB 14.1 01/06/2023    HCT 41.3 01/06/2023    MCV 89.8 01/06/2023     01/06/2023    NEUTROABS 7.60 (H) 01/06/2023    GLUCOSE 169 (H) 01/06/2023    BUN 13 01/06/2023    CREATININE 0.75 01/06/2023    EGFRIFNONA 77 10/18/2021    EGFRIFAFRI 84 10/28/2020     01/06/2023    K 4.0 01/06/2023     01/06/2023    CO2 24.7 01/06/2023    MG 2.1 01/06/2023    CALCIUM 9.4 01/06/2023    ALBUMIN 4.3 01/06/2023    AST 18 01/06/2023    ALT 19 01/06/2023    BILITOT 0.2 01/06/2023     No results found for: \"\"      Assessment & Plan   This is a 54 y.o. woman with history of vulvar cancer, diffuse lichen sclerosis.    Encounter Diagnoses   Name Primary?    ARGENTINA III (vulvar intraepithelial neoplasia III) Yes    Lichen sclerosus et atrophicus     Vulvar pain     Vulvar pruritus      -Status post intradermal steroid injection without significant improvement.  -Previously recommended topical compound including ketamine and lidocaine, but patient did not take it because of increased skin burning.   -Has previously tried clobetasol, prednisone, and tacrolimus.  -Recently patient was on on plaquenil and desoximetasone.   -Biopsy showed intradermal neoplasia    Patient was consented for SIMPLE PARTIAL VULVECTOMY x2.      Risks and benefits of surgery were discussed.  This included, but was not limited to, infection and bleeding like when the skin is cut; damage to surrounding structures; and incisional complications.  Risk of DVT was addressed for major surgeries.  Standard of care efforts to minimize these risks were reviewed.  Typical hospital stay and recovery were discussed as well as " post-procedure precautions.  Surgical implications of chronic illnesses on recovery and surgical outcome were reviewed.   Pain medication regimen for postoperative care was discussed.  Typical regimen and avoidance of narcotics was discussed.  Patient was educated that other factors, such as existing narcotic use, can impact postoperative pain management.    Patient verbalized understanding of the plan including the risks and benefits.  Appropriate perioperative testing including laboratory evaluation, EKG as clinically indicated, chest x-ray as clinically indicated, and preadmission evaluation were all ordered as a part of this patient's care.    We discussed my concerns regarding healing.  Wound care consultation during 24-hour hospital admission is anticipated.  Will need wound care follow-up and a wound care plan prior to discharge home.  Patient with questions regarding possible malignancy of the vulva and was advised that we will have to wait for final pathology.    Pain assessment was performed today as a part of patient’s care.  For patients with pain related to surgery, gynecologic malignancy or cancer treatment, the plan is as noted in the assessment/plan.  For patients with pain not related to these issues, they are to seek any further needed care from a more appropriate provider, such as PCP.      No orders of the defined types were placed in this encounter.    FOLLOW UP: surgery    I spent 34 minutes caring for Edyta on this date of service. This time includes time spent by me in the following activities: preparing for the visit, reviewing tests, performing a medically appropriate examination and/or evaluation, counseling and educating the patient/family/caregiver, referring and communicating with other health care professionals, documenting information in the medical record, independently interpreting results and communicating that information with the patient/family/caregiver, care coordination,  ordering medications, ordering test(s), and ordering procedure(s)      Isabela Nassar MD  08/21/24  16:01 EDT

## 2024-08-20 NOTE — H&P (VIEW-ONLY)
Edyta Franco  [id number]  1970      Reason for visit:  History of vulvar cancer, lichen sclerosus     History of present illness:  The patient is a 54 y.o. year old female who presents today for treatment and evaluation of the above issues.    Patient continues to see dermatology. She has now been on Plaquenil for 1 year.  In the interim, patient went to [place] for evaluation and underwent biopsy there which showed an intradermal neoplasia.  She presents today for follow-up.  She continues to have discomfort and occasional bleeding from the vulva which is a chronic problem.  She notes bleeding from the biopsy site which is still not completely healed.    Oncologic History:  Oncology/Hematology History   Malignant neoplasm of vulva, unspecified   8/14/2017 Biopsy    History of lichens sclerosus, on clobetasol therapy. Pt presented to Dr. Abigail Fong with c/o painful ulcerative left vulvar lesion. Biopsy revealed invasive SCC, extending into deep margin at least 3.5 mm. Referred to Gyn Oncology     8/30/2017 Surgery    Modified radical vulvectomy, left inguinofemoral sentinel lymph node dissection, right inguinofemoral lymph node dissection, and mapping biopsies of the vulva. Surgery by Dr. Nassar and Dr. Bree Russell.     Pathology revealed SCC, focally keratinizing with 0.3 cm invasion. LVSI negative, lymph nodes negative, mapping biopsies negative. Stage IB grade 2       9/24/2017 - 9/27/2017 Other Event    Inpatient hospital admission for inguinal incision cellulitis, inguinal fluid collection, and SIRS. Treated with IV antibiotics and sent home on oral antibiotics.      10/26/2017 - 10/27/2017 Other Event    2 day inpatient admission for recurrent cellulitis, resolved with antibiotic management           Past Medical History:   Diagnosis Date    Epilepsy 03/20/2020    Patient reported she was taken off her medication for this apx 10 years ago.  Reported no seizure activity for apx. 8 years ago.       Fatigue     Hypercholesteremia     Hypertension     Lichen sclerosus of female genitalia     Rectal bleeding 2020    Varicella 1974    Vulva cancer     Vulvar cancer 2017    Wears contact lenses 2020    Patient advised contacts will need to be removed DOS       Past Surgical History:   Procedure Laterality Date     SECTION      COLONOSCOPY N/A 2020    Procedure: COLONOSCOPY WITH BIOPSY;  Surgeon: Eleni Russell MD;  Location: [facility] ENDOSCOPY;  Service: Gastroenterology;  Laterality: N/A;    HAND SURGERY Right 2014    LUMBAR LAMINECTOMY  2011    HEMILAMI RT-C7-T1, RT C7-T1 MEDIAL FACET & CT-T1 DISC (Dr. Cabrera    LYMPH NODE BIOPSY      ORIF ANKLE FRACTURE Right     TOTAL ABDOMINAL HYSTERECTOMY WITH SALPINGO OOPHORECTOMY      Dr. Richard Armendariz - [facility]     VULVA BIOPSY      VULVA SURGERY  2024    biopsy  for Lichens    VULVECTOMY N/A 2017    Procedure: MODIFIED RADICAL VULVECTOMY, BILATERAL SENTINAL LYMPH NODE DISSECTION, MAPPING BIOPSIES OF VULVA;  Surgeon: Isabela Nassar MD;  Location: [facility]placed [facility] OR;  Service:        MEDICATIONS: The current medication list was reviewed with the patient and updated in the EMR this date per the Medical Assistant. Medication dosages and frequencies were confirmed to be accurate.      Allergies:  is allergic to penicillins.    Social History:   Social History     Socioeconomic History    Marital status:     Number of children: 2   Tobacco Use    Smoking status: Never     Passive exposure: Never    Smokeless tobacco: Never   Vaping Use    Vaping status: Never Used   Substance and Sexual Activity    Alcohol use: Yes     Alcohol/week: 1.0 standard drink of alcohol     Types: 1 Glasses of wine per week     Comment: 2x month    Drug use: Never    Sexual activity: Yes     Partners: Male     Birth control/protection: Surgical       Family History:    Family History   Problem Relation Age of Onset    Diabetes type II Father   "   Congenital heart disease Father     Hypertension Father     Hyperlipidemia Father     Diabetes Father     Heart disease Father     Ovarian cancer Mother     Diabetes type II Mother     Congenital heart disease Mother     Hypertension Mother     Stroke Mother     Hyperlipidemia Mother     Arthritis Mother     Heart disease Mother     Heart attack Other     Hyperlipidemia Other     Breast cancer Neg Hx     Colon cancer Neg Hx     Uterine cancer Neg Hx        Health Maintenance:    Health Maintenance   Topic Date Due    Pneumococcal Vaccine 0-64 (1 of 2 - PCV) Never done    HEPATITIS C SCREENING  Never done    ZOSTER VACCINE (1 of 2) Never done    ANNUAL PHYSICAL  02/25/2021    COVID-19 Vaccine (3 - 2023-24 season) 09/01/2023    LIPID PANEL  12/30/2023    PAP SMEAR  05/25/2024    MAMMOGRAM  06/22/2024    INFLUENZA VACCINE  08/01/2024    DXA SCAN  06/06/2025    BMI FOLLOWUP  08/06/2025    TDAP/TD VACCINES (2 - Td or Tdap) 03/01/2030    COLORECTAL CANCER SCREENING  03/23/2030     Review of Systems  Please refer to HPI, remainder of ROS negative.    Physical Exam  Vitals:    08/20/24 1008   BP: 136/82   Pulse: 64   Resp: 18   Temp: 97.3 °F (36.3 °C)   TempSrc: Temporal   SpO2: 99%   Weight: 75.2 kg (165 lb 11.2 oz)   Height: 154.9 cm (60.98\")   PainSc:   5   PainLoc: Vagina  Comment: Vulva       Body mass index is 31.33 kg/m².  Wt Readings from Last 3 Encounters:   08/20/24 75.2 kg (165 lb 11.2 oz)   08/06/24 77.6 kg (171 lb)   06/18/24 74.4 kg (164 lb)     GENERAL: Alert, well-appearing female appearing her stated age who is in no apparent distress.    HEENT: Sclera anicteric. Head normocephalic, atraumatic.  CARDIOVASCULAR:  no peripheral edema.  RESPIRATORY:normal respiratory effort  SKIN:  Warm, dry, well-perfused.  All visible areas intact.  No rashes, lesions, ulcers.  PSYCHIATRIC: AO x3, with appropriate affect, normal thought processes.  NEUROLOGIC: No focal deficits.  Moves extremities well.  MUSCULOSKELETAL: " "Normal gait and station.   EXTREMITIES:   No cyanosis, clubbing, symmetric.     PELVIC exam:    Physical Exam  Genitourinary:      Genitourinary Comments: Areas of concern re: [place]/lichen sclerosis               ECOG PS 0    PROCEDURES: None    Diagnostic Data:      Lab Results   Component Value Date    WBC 10.45 01/06/2023    HGB 14.1 01/06/2023    HCT 41.3 01/06/2023    MCV 89.8 01/06/2023     01/06/2023    NEUTROABS 7.60 (H) 01/06/2023    GLUCOSE 169 (H) 01/06/2023    BUN 13 01/06/2023    CREATININE 0.75 01/06/2023    EGFRIFNONA 77 10/18/2021    EGFRIFAFRI 84 10/28/2020     01/06/2023    K 4.0 01/06/2023     01/06/2023    CO2 24.7 01/06/2023    MG 2.1 01/06/2023    CALCIUM 9.4 01/06/2023    ALBUMIN 4.3 01/06/2023    AST 18 01/06/2023    ALT 19 01/06/2023    BILITOT 0.2 01/06/2023     No results found for: \"\"      Assessment & Plan   This is a 54 y.o. woman with history of vulvar cancer, diffuse lichen sclerosis.    Encounter Diagnoses   Name Primary?    ARGENTINA III (vulvar intraepithelial neoplasia III) Yes    Lichen sclerosus et atrophicus     Vulvar pain     Vulvar pruritus      -Status post intradermal steroid injection without significant improvement.  -Previously recommended topical compound including ketamine and lidocaine, but patient did not take it because of increased skin burning.   -Has previously tried clobetasol, prednisone, and tacrolimus.  -Recently patient was on on plaquenil and desoximetasone.   -Biopsy showed intradermal neoplasia    Patient was consented for SIMPLE PARTIAL VULVECTOMY x2.      Risks and benefits of surgery were discussed.  This included, but was not limited to, infection and bleeding like when the skin is cut; damage to surrounding structures; and incisional complications.  Risk of DVT was addressed for major surgeries.  Standard of care efforts to minimize these risks were reviewed.  Typical hospital stay and recovery were discussed as well as " post-procedure precautions.  Surgical implications of chronic illnesses on recovery and surgical outcome were reviewed.   Pain medication regimen for postoperative care was discussed.  Typical regimen and avoidance of narcotics was discussed.  Patient was educated that other factors, such as existing narcotic use, can impact postoperative pain management.    Patient verbalized understanding of the plan including the risks and benefits.  Appropriate perioperative testing including laboratory evaluation, EKG as clinically indicated, chest x-ray as clinically indicated, and preadmission evaluation were all ordered as a part of this patient's care.    We discussed my concerns regarding healing.  Wound care consultation during 24-hour hospital admission is anticipated.  Will need wound care follow-up and a wound care plan prior to discharge home.  Patient with questions regarding possible malignancy of the vulva and was advised that we will have to wait for final pathology.    Pain assessment was performed today as a part of patient’s care.  For patients with pain related to surgery, gynecologic malignancy or cancer treatment, the plan is as noted in the assessment/plan.  For patients with pain not related to these issues, they are to seek any further needed care from a more appropriate provider, such as PCP.      No orders of the defined types were placed in this encounter.    FOLLOW UP: surgery    I spent 34 minutes caring for Edyta on this date of service. This time includes time spent by me in the following activities: preparing for the visit, reviewing tests, performing a medically appropriate examination and/or evaluation, counseling and educating the patient/family/caregiver, referring and communicating with other health care professionals, documenting information in the medical record, independently interpreting results and communicating that information with the patient/family/caregiver, care coordination,  ordering medications, ordering test(s), and ordering procedure(s)      Isabela Nassar MD  08/21/24  16:01 EDT                 none

## 2024-08-21 PROBLEM — R10.2 VULVAR PAIN: Status: ACTIVE | Noted: 2024-08-21

## 2024-08-27 ENCOUNTER — OFFICE VISIT (OUTPATIENT)
Dept: FAMILY MEDICINE CLINIC | Facility: CLINIC | Age: 54
End: 2024-08-27
Payer: COMMERCIAL

## 2024-08-27 ENCOUNTER — LAB (OUTPATIENT)
Dept: LAB | Facility: HOSPITAL | Age: 54
End: 2024-08-27
Payer: COMMERCIAL

## 2024-08-27 ENCOUNTER — HOSPITAL ENCOUNTER (OUTPATIENT)
Dept: GENERAL RADIOLOGY | Facility: HOSPITAL | Age: 54
Discharge: HOME | End: 2024-08-27
Payer: COMMERCIAL

## 2024-08-27 VITALS
OXYGEN SATURATION: 96 % | HEIGHT: 61 IN | DIASTOLIC BLOOD PRESSURE: 74 MMHG | BODY MASS INDEX: 30.78 KG/M2 | HEART RATE: 57 BPM | TEMPERATURE: 98.1 F | SYSTOLIC BLOOD PRESSURE: 116 MMHG | WEIGHT: 163 LBS

## 2024-08-27 DIAGNOSIS — D07.1 VIN III (VULVAR INTRAEPITHELIAL NEOPLASIA III): ICD-10-CM

## 2024-08-27 DIAGNOSIS — R05.1 ACUTE COUGH: ICD-10-CM

## 2024-08-27 DIAGNOSIS — J10.1 INFLUENZA A: Primary | ICD-10-CM

## 2024-08-27 DIAGNOSIS — R07.9 CHEST PAIN, UNSPECIFIED TYPE: ICD-10-CM

## 2024-08-27 LAB
ALBUMIN SERPL-MCNC: 4.2 G/DL (ref 3.5–5.2)
ALBUMIN/GLOB SERPL: 2 G/DL
ALP SERPL-CCNC: 35 U/L (ref 39–117)
ALT SERPL W P-5'-P-CCNC: 29 U/L (ref 1–33)
ANION GAP SERPL CALCULATED.3IONS-SCNC: 10 MMOL/L (ref 5–15)
AST SERPL-CCNC: 20 U/L (ref 1–32)
BASOPHILS # BLD AUTO: 0.05 10*3/MM3 (ref 0–0.2)
BASOPHILS NFR BLD AUTO: 0.8 % (ref 0–1.5)
BILIRUB SERPL-MCNC: 0.3 MG/DL (ref 0–1.2)
BUN SERPL-MCNC: 9 MG/DL (ref 6–20)
BUN/CREAT SERPL: 10.7 (ref 7–25)
CALCIUM SPEC-SCNC: 9 MG/DL (ref 8.6–10.5)
CHLORIDE SERPL-SCNC: 104 MMOL/L (ref 98–107)
CO2 SERPL-SCNC: 26 MMOL/L (ref 22–29)
CREAT SERPL-MCNC: 0.84 MG/DL (ref 0.57–1)
CRP SERPL-MCNC: 0.51 MG/DL (ref 0–0.5)
D DIMER PPP FEU-MCNC: <0.27 MCGFEU/ML (ref 0–0.54)
DEPRECATED RDW RBC AUTO: 41.6 FL (ref 37–54)
EGFRCR SERPLBLD CKD-EPI 2021: 82.7 ML/MIN/1.73
EOSINOPHIL # BLD AUTO: 0.17 10*3/MM3 (ref 0–0.4)
EOSINOPHIL NFR BLD AUTO: 2.6 % (ref 0.3–6.2)
ERYTHROCYTE [DISTWIDTH] IN BLOOD BY AUTOMATED COUNT: 12.3 % (ref 12.3–15.4)
ERYTHROCYTE [SEDIMENTATION RATE] IN BLOOD: 3 MM/HR (ref 0–30)
EXPIRATION DATE: ABNORMAL
FLUAV AG UPPER RESP QL IA.RAPID: DETECTED
FLUBV AG UPPER RESP QL IA.RAPID: NOT DETECTED
GLOBULIN UR ELPH-MCNC: 2.1 GM/DL
GLUCOSE SERPL-MCNC: 91 MG/DL (ref 65–99)
HCT VFR BLD AUTO: 37.9 % (ref 34–46.6)
HGB BLD-MCNC: 12.9 G/DL (ref 12–15.9)
IMM GRANULOCYTES # BLD AUTO: 0.02 10*3/MM3 (ref 0–0.05)
IMM GRANULOCYTES NFR BLD AUTO: 0.3 % (ref 0–0.5)
INTERNAL CONTROL: ABNORMAL
LYMPHOCYTES # BLD AUTO: 1.23 10*3/MM3 (ref 0.7–3.1)
LYMPHOCYTES NFR BLD AUTO: 18.8 % (ref 19.6–45.3)
Lab: ABNORMAL
MCH RBC QN AUTO: 31.8 PG (ref 26.6–33)
MCHC RBC AUTO-ENTMCNC: 34 G/DL (ref 31.5–35.7)
MCV RBC AUTO: 93.3 FL (ref 79–97)
MONOCYTES # BLD AUTO: 0.4 10*3/MM3 (ref 0.1–0.9)
MONOCYTES NFR BLD AUTO: 6.1 % (ref 5–12)
NEUTROPHILS NFR BLD AUTO: 4.66 10*3/MM3 (ref 1.7–7)
NEUTROPHILS NFR BLD AUTO: 71.4 % (ref 42.7–76)
NRBC BLD AUTO-RTO: 0 /100 WBC (ref 0–0.2)
PLATELET # BLD AUTO: 232 10*3/MM3 (ref 140–450)
PMV BLD AUTO: 10.5 FL (ref 6–12)
POTASSIUM SERPL-SCNC: 4.1 MMOL/L (ref 3.5–5.2)
PROT SERPL-MCNC: 6.3 G/DL (ref 6–8.5)
RBC # BLD AUTO: 4.06 10*6/MM3 (ref 3.77–5.28)
SARS-COV-2 AG UPPER RESP QL IA.RAPID: NOT DETECTED
SODIUM SERPL-SCNC: 140 MMOL/L (ref 136–145)
WBC NRBC COR # BLD AUTO: 6.53 10*3/MM3 (ref 3.4–10.8)

## 2024-08-27 PROCEDURE — 85025 COMPLETE CBC W/AUTO DIFF WBC: CPT

## 2024-08-27 PROCEDURE — 87428 SARSCOV & INF VIR A&B AG IA: CPT | Performed by: FAMILY MEDICINE

## 2024-08-27 PROCEDURE — 85379 FIBRIN DEGRADATION QUANT: CPT

## 2024-08-27 PROCEDURE — 71046 X-RAY EXAM CHEST 2 VIEWS: CPT

## 2024-08-27 PROCEDURE — 80053 COMPREHEN METABOLIC PANEL: CPT

## 2024-08-27 PROCEDURE — 86140 C-REACTIVE PROTEIN: CPT

## 2024-08-27 PROCEDURE — 99214 OFFICE O/P EST MOD 30 MIN: CPT | Performed by: FAMILY MEDICINE

## 2024-08-27 PROCEDURE — 85652 RBC SED RATE AUTOMATED: CPT

## 2024-08-27 PROCEDURE — 36415 COLL VENOUS BLD VENIPUNCTURE: CPT

## 2024-08-27 RX ORDER — ALBUTEROL SULFATE 90 UG/1
2 AEROSOL, METERED RESPIRATORY (INHALATION) EVERY 4 HOURS PRN
Qty: 18 G | Refills: 1 | Status: SHIPPED | OUTPATIENT
Start: 2024-08-27

## 2024-08-27 RX ORDER — OSELTAMIVIR PHOSPHATE 75 MG/1
75 CAPSULE ORAL 2 TIMES DAILY
Qty: 10 CAPSULE | Refills: 0 | Status: SHIPPED | OUTPATIENT
Start: 2024-08-27 | End: 2024-09-01

## 2024-08-27 NOTE — PROGRESS NOTES
Subjective   Edyta Franco is a 54 y.o. female.     History of Present Illness  Here with , complaining of cough, chest pain  Cough  This is a new problem. The current episode started in the past 7 days. The problem has been worse. The problem occurs every few minutes. The cough is Dry. Associated symptoms include chest pain, chills, headaches, myalgias, nasal congestion, postnasal drip and shortness of breath. Pertinent negatives include no ear pain, eye redness, fever, hemoptysis, rash, rhinorrhea, sore throat or wheezing. The symptoms are aggravated by lying down. She has tried nothing for the symptoms.     The following portions of the patient's history were reviewed and updated as appropriate: allergies, current medications, past family history, past medical history, past social history, past surgical history, and problem list.    Review of Systems   Constitutional:  Positive for chills and fatigue. Negative for fever.   HENT:  Positive for congestion and postnasal drip. Negative for ear pain, mouth sores, nosebleeds, rhinorrhea, sore throat and trouble swallowing.    Eyes:  Negative for pain, discharge and redness.   Respiratory:  Positive for cough, chest tightness and shortness of breath. Negative for hemoptysis and wheezing.    Cardiovascular:  Positive for chest pain. Negative for palpitations and leg swelling.   Gastrointestinal:  Negative for diarrhea, nausea and vomiting.   Musculoskeletal:  Positive for arthralgias and myalgias.   Skin:  Negative for rash.   Neurological:  Positive for weakness (generalized) and headaches.   Hematological:  Negative for adenopathy.   Psychiatric/Behavioral:  Negative for confusion.        Objective   Vitals:    08/27/24 0953   BP: 116/74   Pulse: 57   Temp: 98.1 °F (36.7 °C)   SpO2: 96%     Body mass index is 30.8 kg/m².      08/27/24  0953   Weight: 73.9 kg (163 lb)       Physical Exam  Vitals and nursing note reviewed.   Constitutional:       General: She  is in acute distress (mild due to discomfort).      Appearance: She is overweight. She is ill-appearing.   HENT:      Head: Atraumatic.      Right Ear: Tympanic membrane and ear canal normal.      Left Ear: Tympanic membrane and ear canal normal.      Nose: Congestion present. No rhinorrhea.      Mouth/Throat:      Mouth: Mucous membranes are moist. No oral lesions.      Pharynx: Oropharynx is clear.   Eyes:      Conjunctiva/sclera: Conjunctivae normal.   Cardiovascular:      Rate and Rhythm: Regular rhythm. Bradycardia present.      Heart sounds: Normal heart sounds.   Pulmonary:      Effort: Pulmonary effort is normal. No respiratory distress.      Breath sounds: Decreased breath sounds present. No wheezing, rhonchi or rales.   Musculoskeletal:      Right lower leg: No edema.      Left lower leg: No edema.   Lymphadenopathy:      Cervical: No cervical adenopathy.   Skin:     General: Skin is warm and dry.      Coloration: Skin is not cyanotic, jaundiced or pale.   Neurological:      Mental Status: She is alert and oriented to person, place, and time.      Gait: Gait is intact.   Psychiatric:         Mood and Affect: Mood is anxious.         Behavior: Behavior normal. Behavior is cooperative.         Cognition and Memory: Cognition normal.       POC COVID neg  FLU A POSITIVE    Assessment & Plan   Diagnoses and all orders for this visit:    1. Influenza A (Primary)  -     oseltamivir (Tamiflu) 75 MG capsule; Take 1 capsule by mouth 2 (Two) Times a Day for 5 days.  Dispense: 10 capsule; Refill: 0    2. Acute cough  -     POCT SARS-CoV-2 + Flu Antigen RANJAN  -     XR Chest PA & Lateral; Future  -     CBC & Differential; Future  -     Sedimentation Rate; Future  -     C-reactive protein; Future  -     D-dimer, Quantitative; Future    3. Chest pain, unspecified type  -     XR Chest PA & Lateral; Future  -     CBC & Differential; Future  -     Sedimentation Rate; Future  -     C-reactive protein; Future  -     D-dimer,  Quantitative; Future    Other orders  -     albuterol sulfate  (90 Base) MCG/ACT inhaler; Inhale 2 puffs Every 4 (Four) Hours As Needed for Wheezing or Shortness of Air.  Dispense: 18 g; Refill: 1       I have reviewed risks/benefits and potential side effects of various treatment options for influenza. Pt voices understanding and wishes to proceed with Tamiflu, symptom mgnt. Further eval as above due to severity of CP and comorbid medical issues.    Routine f/u with PCP and specialists as scheduled.  F/u sooner as needed/instructed.  I will contact patient regarding test results and provide instructions regarding any necessary changes in plan of care.  Patient was encouraged to keep me informed of any acute changes, lack of improvement, or any new concerning symptoms.  Pt is aware of reasons to seek emergent care.  Patient voiced understanding of all instructions and denied further questions.    Please note that portions of this note may have been completed with a voice recognition program.             ADDENDUM:     XR Chest PA & Lateral  Result Date: 8/27/2024  Unremarkable chest exam.    This report was signed and finalized on 8/27/2024 12:09 PM by Josh Johnson MD.       Recent Results (from the past 72 hour(s))   POCT SARS-CoV-2 + Flu Antigen RANJAN    Collection Time: 08/27/24 10:16 AM    Specimen: Swab   Result Value Ref Range    SARS Antigen Not Detected Not Detected, Presumptive Negative    Influenza A Antigen RANJAN Detected (A) Not Detected    Influenza B Antigen RANJAN Not Detected Not Detected    Internal Control Passed Passed    Lot Number [id number]     Expiration Date [id number]    D-dimer, Quantitative    Collection Time: 08/27/24 11:40 AM    Specimen: Blood   Result Value Ref Range    D-Dimer, Quantitative <0.27 0.00 - 0.54 MCGFEU/mL   Comprehensive Metabolic Panel    Collection Time: 08/27/24 11:40 AM    Specimen: Blood   Result Value Ref Range    Glucose 91 65 - 99 mg/dL    BUN 9 6 - 20 mg/dL     Creatinine 0.84 0.57 - 1.00 mg/dL    Sodium 140 136 - 145 mmol/L    Potassium 4.1 3.5 - 5.2 mmol/L    Chloride 104 98 - 107 mmol/L    CO2 26.0 22.0 - 29.0 mmol/L    Calcium 9.0 8.6 - 10.5 mg/dL    Total Protein 6.3 6.0 - 8.5 g/dL    Albumin 4.2 3.5 - 5.2 g/dL    ALT (SGPT) 29 1 - 33 U/L    AST (SGOT) 20 1 - 32 U/L    Alkaline Phosphatase 35 (L) 39 - 117 U/L    Total Bilirubin 0.3 0.0 - 1.2 mg/dL    Globulin 2.1 gm/dL    A/G Ratio 2.0 g/dL    BUN/Creatinine Ratio 10.7 7.0 - 25.0    Anion Gap 10.0 5.0 - 15.0 mmol/L    eGFR 82.7 >60.0 mL/min/1.73   Sedimentation Rate    Collection Time: 08/27/24 11:40 AM    Specimen: Blood   Result Value Ref Range    Sed Rate 3 0 - 30 mm/hr   C-reactive protein    Collection Time: 08/27/24 11:40 AM    Specimen: Blood   Result Value Ref Range    C-Reactive Protein 0.51 (H) 0.00 - 0.50 mg/dL   CBC Auto Differential    Collection Time: 08/27/24 11:40 AM    Specimen: Blood   Result Value Ref Range    WBC 6.53 3.40 - 10.80 10*3/mm3    RBC 4.06 3.77 - 5.28 10*6/mm3    Hemoglobin 12.9 12.0 - 15.9 g/dL    Hematocrit 37.9 34.0 - 46.6 %    MCV 93.3 79.0 - 97.0 fL    MCH 31.8 26.6 - 33.0 pg    MCHC 34.0 31.5 - 35.7 g/dL    RDW 12.3 12.3 - 15.4 %    RDW-SD 41.6 37.0 - 54.0 fl    MPV 10.5 6.0 - 12.0 fL    Platelets 232 140 - 450 10*3/mm3    Neutrophil % 71.4 42.7 - 76.0 %    Lymphocyte % 18.8 (L) 19.6 - 45.3 %    Monocyte % 6.1 5.0 - 12.0 %    Eosinophil % 2.6 0.3 - 6.2 %    Basophil % 0.8 0.0 - 1.5 %    Immature Grans % 0.3 0.0 - 0.5 %    Neutrophils, Absolute 4.66 1.70 - 7.00 10*3/mm3    Lymphocytes, Absolute 1.23 0.70 - 3.10 10*3/mm3    Monocytes, Absolute 0.40 0.10 - 0.90 10*3/mm3    Eosinophils, Absolute 0.17 0.00 - 0.40 10*3/mm3    Basophils, Absolute 0.05 0.00 - 0.20 10*3/mm3    Immature Grans, Absolute 0.02 0.00 - 0.05 10*3/mm3    nRBC 0.0 0.0 - 0.2 /100 WBC     No new concerns on recent labs, CXR. Results communicated to pt via [employer].

## 2024-08-28 ENCOUNTER — TELEPHONE (OUTPATIENT)
Dept: GYNECOLOGIC ONCOLOGY | Facility: CLINIC | Age: 54
End: 2024-08-28
Payer: COMMERCIAL

## 2024-08-28 NOTE — TELEPHONE ENCOUNTER
Caller: Edyta Franco    Relationship: Self    Best call back number: [phone]     What is the best time to reach you: ASAP    Who are you requesting to speak with (clinical staff, provider,  specific staff member):       What was the call regarding: PATIENT'S INSURANCE HAS DENIED THE INPATIENT HOSPITAL STAY FOR HER SURGERY IN 2 WEEKS, SAYS AN APPEAL NEEDS TO BE MADE.  PLEASE CALL PATIENT TO ADVISE IF OUR OFFICE HANDLES THAT OR IF SHE NEEDS TO MAKE THE APPEAL.

## 2024-08-28 NOTE — TELEPHONE ENCOUNTER
CALLED PT BACK . LET HER KNOW . I RESUBMITTED  SURGERY TO INSURANCE AS OUTPATIENT. INSURANCE APPROVED. THEN SHE WILL BE ADMITTED AS OBSERVATION. THANKS

## 2024-08-29 PROBLEM — R50.9 FEVER WITH CHILLS: Status: RESOLVED | Noted: 2018-09-28 | Resolved: 2024-08-29

## 2024-08-29 PROBLEM — M25.571: Status: RESOLVED | Noted: 2022-05-06 | Resolved: 2024-08-29

## 2024-09-05 ENCOUNTER — PRE-ADMISSION TESTING (OUTPATIENT)
Dept: PREADMISSION TESTING | Facility: HOSPITAL | Age: 54
End: 2024-09-05
Payer: COMMERCIAL

## 2024-09-05 VITALS — BODY MASS INDEX: 31.53 KG/M2 | HEIGHT: 61 IN | WEIGHT: 167 LBS

## 2024-09-05 LAB
ALBUMIN SERPL-MCNC: 4.4 G/DL (ref 3.5–5.2)
ALBUMIN/GLOB SERPL: 1.7 G/DL
ALP SERPL-CCNC: 34 U/L (ref 39–117)
ALT SERPL W P-5'-P-CCNC: 21 U/L (ref 1–33)
ANION GAP SERPL CALCULATED.3IONS-SCNC: 14 MMOL/L (ref 5–15)
AST SERPL-CCNC: 19 U/L (ref 1–32)
BILIRUB SERPL-MCNC: 0.3 MG/DL (ref 0–1.2)
BUN SERPL-MCNC: 18 MG/DL (ref 6–20)
BUN/CREAT SERPL: 16.7 (ref 7–25)
CALCIUM SPEC-SCNC: 9.5 MG/DL (ref 8.6–10.5)
CHLORIDE SERPL-SCNC: 100 MMOL/L (ref 98–107)
CO2 SERPL-SCNC: 27 MMOL/L (ref 22–29)
CREAT SERPL-MCNC: 1.08 MG/DL (ref 0.57–1)
DEPRECATED RDW RBC AUTO: 41.7 FL (ref 37–54)
EGFRCR SERPLBLD CKD-EPI 2021: 61.2 ML/MIN/1.73
ERYTHROCYTE [DISTWIDTH] IN BLOOD BY AUTOMATED COUNT: 12.1 % (ref 12.3–15.4)
GLOBULIN UR ELPH-MCNC: 2.6 GM/DL
GLUCOSE SERPL-MCNC: 98 MG/DL (ref 65–99)
HCT VFR BLD AUTO: 41.7 % (ref 34–46.6)
HGB BLD-MCNC: 14.1 G/DL (ref 12–15.9)
MCH RBC QN AUTO: 31.5 PG (ref 26.6–33)
MCHC RBC AUTO-ENTMCNC: 33.8 G/DL (ref 31.5–35.7)
MCV RBC AUTO: 93.3 FL (ref 79–97)
PLATELET # BLD AUTO: 257 10*3/MM3 (ref 140–450)
PMV BLD AUTO: 10.1 FL (ref 6–12)
POTASSIUM SERPL-SCNC: 3.9 MMOL/L (ref 3.5–5.2)
PROT SERPL-MCNC: 7 G/DL (ref 6–8.5)
RBC # BLD AUTO: 4.47 10*6/MM3 (ref 3.77–5.28)
SODIUM SERPL-SCNC: 141 MMOL/L (ref 136–145)
WBC NRBC COR # BLD AUTO: 6.31 10*3/MM3 (ref 3.4–10.8)

## 2024-09-05 PROCEDURE — 80053 COMPREHEN METABOLIC PANEL: CPT

## 2024-09-05 PROCEDURE — 85027 COMPLETE CBC AUTOMATED: CPT

## 2024-09-05 PROCEDURE — 36415 COLL VENOUS BLD VENIPUNCTURE: CPT

## 2024-09-05 NOTE — PAT
Per Anesthesia Request, patient instructed not to take their ACE/ARB medications on the AM of surgery.      An arrival time for procedure was not provided during PAT visit. If patient had any questions or concerns about their arrival time, they were instructed to contact their surgeon/physician.  Additionally, if the patient referred to an arrival time that was acquired from their my chart account, patient was encouraged to verify that time with their surgeon/physician. Arrival times are NOT provided in Pre Admission Testing Department.

## 2024-09-05 NOTE — PAT
NOTIFIED DAWSON AT DR. 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 LAST DOSE WILL BE 9/10/24

## 2024-09-11 ENCOUNTER — TELEPHONE (OUTPATIENT)
Dept: GYNECOLOGIC ONCOLOGY | Facility: CLINIC | Age: 54
End: 2024-09-11
Payer: COMMERCIAL

## 2024-09-11 ENCOUNTER — ANESTHESIA EVENT (OUTPATIENT)
Dept: PERIOP | Facility: HOSPITAL | Age: 54
End: 2024-09-11
Payer: COMMERCIAL

## 2024-09-11 RX ORDER — FAMOTIDINE 10 MG/ML
20 INJECTION, SOLUTION INTRAVENOUS ONCE
Status: CANCELLED | OUTPATIENT
Start: 2024-09-11 | End: 2024-09-11

## 2024-09-11 NOTE — TELEPHONE ENCOUNTER
CONFIRM SURGERY ON 09/12/2024, ARRIVE AT 10:30 AM TO MAIN REGISTRATION AT [facility].    NPO AFTER MIDNIGHT ON 09/11/2024

## 2024-09-12 ENCOUNTER — HOSPITAL ENCOUNTER (OUTPATIENT)
Facility: HOSPITAL | Age: 54
Discharge: HOME | End: 2024-09-13
Attending: OBSTETRICS & GYNECOLOGY | Admitting: OBSTETRICS & GYNECOLOGY
Payer: COMMERCIAL

## 2024-09-12 ENCOUNTER — ANESTHESIA (OUTPATIENT)
Dept: PERIOP | Facility: HOSPITAL | Age: 54
End: 2024-09-12
Payer: COMMERCIAL

## 2024-09-12 DIAGNOSIS — Z48.89 ENCOUNTER FOR POSTOPERATIVE WOUND CARE: Primary | ICD-10-CM

## 2024-09-12 DIAGNOSIS — D07.1 VIN III (VULVAR INTRAEPITHELIAL NEOPLASIA III): ICD-10-CM

## 2024-09-12 DIAGNOSIS — R10.2 VULVAR PAIN: ICD-10-CM

## 2024-09-12 PROCEDURE — G0378 HOSPITAL OBSERVATION PER HR: HCPCS

## 2024-09-12 PROCEDURE — 88341 IMHCHEM/IMCYTCHM EA ADD ANTB: CPT | Performed by: OBSTETRICS & GYNECOLOGY

## 2024-09-12 PROCEDURE — 88342 IMHCHEM/IMCYTCHM 1ST ANTB: CPT | Performed by: OBSTETRICS & GYNECOLOGY

## 2024-09-12 PROCEDURE — 25010000002 FENTANYL CITRATE (PF) 100 MCG/2ML SOLUTION

## 2024-09-12 PROCEDURE — 25810000003 LACTATED RINGERS PER 1000 ML: Performed by: ANESTHESIOLOGY

## 2024-09-12 PROCEDURE — 25010000002 SUGAMMADEX 200 MG/2ML SOLUTION

## 2024-09-12 PROCEDURE — 25010000002 PROPOFOL 10 MG/ML EMULSION

## 2024-09-12 PROCEDURE — 56620 VULVECTOMY SIMPLE PARTIAL: CPT | Performed by: PHYSICIAN ASSISTANT

## 2024-09-12 PROCEDURE — 56620 VULVECTOMY SIMPLE PARTIAL: CPT | Performed by: OBSTETRICS & GYNECOLOGY

## 2024-09-12 PROCEDURE — 25010000002 CEFAZOLIN PER 500 MG: Performed by: OBSTETRICS & GYNECOLOGY

## 2024-09-12 PROCEDURE — 25010000002 MIDAZOLAM PER 1 MG

## 2024-09-12 PROCEDURE — 25010000002 DEXAMETHASONE PER 1 MG

## 2024-09-12 PROCEDURE — 25810000003 LACTATED RINGERS PER 1000 ML: Performed by: OBSTETRICS & GYNECOLOGY

## 2024-09-12 PROCEDURE — 25010000002 ONDANSETRON PER 1 MG

## 2024-09-12 PROCEDURE — 88309 TISSUE EXAM BY PATHOLOGIST: CPT | Performed by: OBSTETRICS & GYNECOLOGY

## 2024-09-12 RX ORDER — LISINOPRIL 20 MG/1
20 TABLET ORAL DAILY
Status: DISCONTINUED | OUTPATIENT
Start: 2024-09-12 | End: 2024-09-13 | Stop reason: HOSPADM

## 2024-09-12 RX ORDER — LIDOCAINE HYDROCHLORIDE 10 MG/ML
INJECTION, SOLUTION EPIDURAL; INFILTRATION; INTRACAUDAL; PERINEURAL AS NEEDED
Status: DISCONTINUED | OUTPATIENT
Start: 2024-09-12 | End: 2024-09-12 | Stop reason: SURG

## 2024-09-12 RX ORDER — DROPERIDOL 2.5 MG/ML
0.62 INJECTION, SOLUTION INTRAMUSCULAR; INTRAVENOUS ONCE AS NEEDED
Status: DISCONTINUED | OUTPATIENT
Start: 2024-09-12 | End: 2024-09-12 | Stop reason: HOSPADM

## 2024-09-12 RX ORDER — ONDANSETRON 2 MG/ML
INJECTION INTRAMUSCULAR; INTRAVENOUS AS NEEDED
Status: DISCONTINUED | OUTPATIENT
Start: 2024-09-12 | End: 2024-09-12 | Stop reason: SURG

## 2024-09-12 RX ORDER — NALOXONE HCL 0.4 MG/ML
0.4 VIAL (ML) INJECTION AS NEEDED
Status: DISCONTINUED | OUTPATIENT
Start: 2024-09-12 | End: 2024-09-12 | Stop reason: HOSPADM

## 2024-09-12 RX ORDER — PRAVASTATIN SODIUM 20 MG
20 TABLET ORAL DAILY
Status: DISCONTINUED | OUTPATIENT
Start: 2024-09-13 | End: 2024-09-13 | Stop reason: HOSPADM

## 2024-09-12 RX ORDER — SODIUM CHLORIDE 0.9 % (FLUSH) 0.9 %
10 SYRINGE (ML) INJECTION AS NEEDED
Status: DISCONTINUED | OUTPATIENT
Start: 2024-09-12 | End: 2024-09-12 | Stop reason: HOSPADM

## 2024-09-12 RX ORDER — TORSEMIDE 10 MG/1
10 TABLET ORAL DAILY
Status: DISCONTINUED | OUTPATIENT
Start: 2024-09-12 | End: 2024-09-13 | Stop reason: HOSPADM

## 2024-09-12 RX ORDER — SODIUM CHLORIDE 0.9 % (FLUSH) 0.9 %
10 SYRINGE (ML) INJECTION EVERY 12 HOURS SCHEDULED
Status: DISCONTINUED | OUTPATIENT
Start: 2024-09-12 | End: 2024-09-12 | Stop reason: HOSPADM

## 2024-09-12 RX ORDER — PROMETHAZINE HYDROCHLORIDE 12.5 MG/1
12.5 TABLET ORAL EVERY 6 HOURS PRN
Status: DISCONTINUED | OUTPATIENT
Start: 2024-09-12 | End: 2024-09-13 | Stop reason: HOSPADM

## 2024-09-12 RX ORDER — IPRATROPIUM BROMIDE AND ALBUTEROL SULFATE 2.5; .5 MG/3ML; MG/3ML
3 SOLUTION RESPIRATORY (INHALATION) ONCE AS NEEDED
Status: DISCONTINUED | OUTPATIENT
Start: 2024-09-12 | End: 2024-09-12 | Stop reason: HOSPADM

## 2024-09-12 RX ORDER — SIMETHICONE 80 MG
80 TABLET,CHEWABLE ORAL 4 TIMES DAILY PRN
Status: DISCONTINUED | OUTPATIENT
Start: 2024-09-12 | End: 2024-09-13 | Stop reason: HOSPADM

## 2024-09-12 RX ORDER — DROPERIDOL 2.5 MG/ML
0.62 INJECTION, SOLUTION INTRAMUSCULAR; INTRAVENOUS
Status: DISCONTINUED | OUTPATIENT
Start: 2024-09-12 | End: 2024-09-12 | Stop reason: HOSPADM

## 2024-09-12 RX ORDER — ONDANSETRON 2 MG/ML
4 INJECTION INTRAMUSCULAR; INTRAVENOUS ONCE AS NEEDED
Status: DISCONTINUED | OUTPATIENT
Start: 2024-09-12 | End: 2024-09-12 | Stop reason: HOSPADM

## 2024-09-12 RX ORDER — SODIUM CHLORIDE, SODIUM LACTATE, POTASSIUM CHLORIDE, CALCIUM CHLORIDE 600; 310; 30; 20 MG/100ML; MG/100ML; MG/100ML; MG/100ML
75 INJECTION, SOLUTION INTRAVENOUS CONTINUOUS
Status: DISCONTINUED | OUTPATIENT
Start: 2024-09-12 | End: 2024-09-13 | Stop reason: HOSPADM

## 2024-09-12 RX ORDER — CELECOXIB 200 MG/1
200 CAPSULE ORAL ONCE
Status: COMPLETED | OUTPATIENT
Start: 2024-09-12 | End: 2024-09-12

## 2024-09-12 RX ORDER — NALOXONE HCL 0.4 MG/ML
0.4 VIAL (ML) INJECTION
Status: DISCONTINUED | OUTPATIENT
Start: 2024-09-12 | End: 2024-09-13 | Stop reason: HOSPADM

## 2024-09-12 RX ORDER — EPHEDRINE SULFATE 50 MG/ML
INJECTION INTRAVENOUS AS NEEDED
Status: DISCONTINUED | OUTPATIENT
Start: 2024-09-12 | End: 2024-09-12 | Stop reason: SURG

## 2024-09-12 RX ORDER — HYDRALAZINE HYDROCHLORIDE 20 MG/ML
5 INJECTION INTRAMUSCULAR; INTRAVENOUS
Status: DISCONTINUED | OUTPATIENT
Start: 2024-09-12 | End: 2024-09-12 | Stop reason: HOSPADM

## 2024-09-12 RX ORDER — BUPIVACAINE HYDROCHLORIDE AND EPINEPHRINE 2.5; 5 MG/ML; UG/ML
INJECTION, SOLUTION INFILTRATION; PERINEURAL AS NEEDED
Status: DISCONTINUED | OUTPATIENT
Start: 2024-09-12 | End: 2024-09-12 | Stop reason: HOSPADM

## 2024-09-12 RX ORDER — NALOXONE HCL 0.4 MG/ML
0.1 VIAL (ML) INJECTION
Status: DISCONTINUED | OUTPATIENT
Start: 2024-09-12 | End: 2024-09-13 | Stop reason: HOSPADM

## 2024-09-12 RX ORDER — BUPIVACAINE HCL/0.9 % NACL/PF 0.125 %
PLASTIC BAG, INJECTION (ML) EPIDURAL AS NEEDED
Status: DISCONTINUED | OUTPATIENT
Start: 2024-09-12 | End: 2024-09-12 | Stop reason: SURG

## 2024-09-12 RX ORDER — FENTANYL CITRATE 50 UG/ML
50 INJECTION, SOLUTION INTRAMUSCULAR; INTRAVENOUS
Status: DISCONTINUED | OUTPATIENT
Start: 2024-09-12 | End: 2024-09-12 | Stop reason: HOSPADM

## 2024-09-12 RX ORDER — LABETALOL HYDROCHLORIDE 5 MG/ML
5 INJECTION, SOLUTION INTRAVENOUS
Status: DISCONTINUED | OUTPATIENT
Start: 2024-09-12 | End: 2024-09-12 | Stop reason: HOSPADM

## 2024-09-12 RX ORDER — HYDROMORPHONE HYDROCHLORIDE 1 MG/ML
0.5 INJECTION, SOLUTION INTRAMUSCULAR; INTRAVENOUS; SUBCUTANEOUS
Status: DISCONTINUED | OUTPATIENT
Start: 2024-09-12 | End: 2024-09-12 | Stop reason: HOSPADM

## 2024-09-12 RX ORDER — SODIUM CHLORIDE, SODIUM LACTATE, POTASSIUM CHLORIDE, CALCIUM CHLORIDE 600; 310; 30; 20 MG/100ML; MG/100ML; MG/100ML; MG/100ML
9 INJECTION, SOLUTION INTRAVENOUS CONTINUOUS
Status: DISCONTINUED | OUTPATIENT
Start: 2024-09-12 | End: 2024-09-12

## 2024-09-12 RX ORDER — SCOLOPAMINE TRANSDERMAL SYSTEM 1 MG/1
1 PATCH, EXTENDED RELEASE TRANSDERMAL CONTINUOUS
Status: DISCONTINUED | OUTPATIENT
Start: 2024-09-12 | End: 2024-09-13 | Stop reason: HOSPADM

## 2024-09-12 RX ORDER — HYDROCODONE BITARTRATE AND ACETAMINOPHEN 5; 325 MG/1; MG/1
1 TABLET ORAL ONCE AS NEEDED
Status: DISCONTINUED | OUTPATIENT
Start: 2024-09-12 | End: 2024-09-12 | Stop reason: HOSPADM

## 2024-09-12 RX ORDER — FAMOTIDINE 20 MG/1
20 TABLET, FILM COATED ORAL ONCE
Status: COMPLETED | OUTPATIENT
Start: 2024-09-12 | End: 2024-09-12

## 2024-09-12 RX ORDER — ROCURONIUM BROMIDE 10 MG/ML
INJECTION, SOLUTION INTRAVENOUS AS NEEDED
Status: DISCONTINUED | OUTPATIENT
Start: 2024-09-12 | End: 2024-09-12 | Stop reason: SURG

## 2024-09-12 RX ORDER — HEPARIN SODIUM 5000 [USP'U]/ML
5000 INJECTION, SOLUTION INTRAVENOUS; SUBCUTANEOUS EVERY 8 HOURS SCHEDULED
Status: DISCONTINUED | OUTPATIENT
Start: 2024-09-13 | End: 2024-09-13 | Stop reason: HOSPADM

## 2024-09-12 RX ORDER — ONDANSETRON 4 MG/1
4 TABLET, ORALLY DISINTEGRATING ORAL EVERY 6 HOURS PRN
Status: DISCONTINUED | OUTPATIENT
Start: 2024-09-12 | End: 2024-09-13 | Stop reason: HOSPADM

## 2024-09-12 RX ORDER — MIDAZOLAM HYDROCHLORIDE 1 MG/ML
INJECTION INTRAMUSCULAR; INTRAVENOUS AS NEEDED
Status: DISCONTINUED | OUTPATIENT
Start: 2024-09-12 | End: 2024-09-12 | Stop reason: SURG

## 2024-09-12 RX ORDER — DEXAMETHASONE SODIUM PHOSPHATE 4 MG/ML
INJECTION, SOLUTION INTRA-ARTICULAR; INTRALESIONAL; INTRAMUSCULAR; INTRAVENOUS; SOFT TISSUE AS NEEDED
Status: DISCONTINUED | OUTPATIENT
Start: 2024-09-12 | End: 2024-09-12 | Stop reason: SURG

## 2024-09-12 RX ORDER — ALBUTEROL SULFATE 90 UG/1
AEROSOL, METERED RESPIRATORY (INHALATION) AS NEEDED
Status: DISCONTINUED | OUTPATIENT
Start: 2024-09-12 | End: 2024-09-12 | Stop reason: SURG

## 2024-09-12 RX ORDER — OXYCODONE HYDROCHLORIDE 5 MG/1
5 TABLET ORAL EVERY 4 HOURS PRN
Status: DISCONTINUED | OUTPATIENT
Start: 2024-09-12 | End: 2024-09-13 | Stop reason: HOSPADM

## 2024-09-12 RX ORDER — TEMAZEPAM 15 MG/1
15 CAPSULE ORAL NIGHTLY PRN
Status: DISCONTINUED | OUTPATIENT
Start: 2024-09-12 | End: 2024-09-13 | Stop reason: HOSPADM

## 2024-09-12 RX ORDER — HYDROMORPHONE HYDROCHLORIDE 1 MG/ML
0.5 INJECTION, SOLUTION INTRAMUSCULAR; INTRAVENOUS; SUBCUTANEOUS
Status: DISCONTINUED | OUTPATIENT
Start: 2024-09-12 | End: 2024-09-13 | Stop reason: HOSPADM

## 2024-09-12 RX ORDER — PREGABALIN 150 MG/1
150 CAPSULE ORAL ONCE
Status: COMPLETED | OUTPATIENT
Start: 2024-09-12 | End: 2024-09-12

## 2024-09-12 RX ORDER — PROMETHAZINE HYDROCHLORIDE 25 MG/1
25 TABLET ORAL ONCE AS NEEDED
Status: DISCONTINUED | OUTPATIENT
Start: 2024-09-12 | End: 2024-09-12 | Stop reason: HOSPADM

## 2024-09-12 RX ORDER — SODIUM CHLORIDE 0.9 % (FLUSH) 0.9 %
3 SYRINGE (ML) INJECTION EVERY 12 HOURS SCHEDULED
Status: DISCONTINUED | OUTPATIENT
Start: 2024-09-12 | End: 2024-09-12 | Stop reason: HOSPADM

## 2024-09-12 RX ORDER — SODIUM CHLORIDE 0.9 % (FLUSH) 0.9 %
3-10 SYRINGE (ML) INJECTION AS NEEDED
Status: DISCONTINUED | OUTPATIENT
Start: 2024-09-12 | End: 2024-09-12 | Stop reason: HOSPADM

## 2024-09-12 RX ORDER — SILVER SULFADIAZINE 10 MG/G
CREAM TOPICAL AS NEEDED
Status: DISCONTINUED | OUTPATIENT
Start: 2024-09-12 | End: 2024-09-12 | Stop reason: HOSPADM

## 2024-09-12 RX ORDER — ESTRADIOL 0.5 MG/1
0.5 TABLET ORAL DAILY
Status: DISCONTINUED | OUTPATIENT
Start: 2024-09-13 | End: 2024-09-13 | Stop reason: HOSPADM

## 2024-09-12 RX ORDER — ACETAMINOPHEN 325 MG/1
650 TABLET ORAL EVERY 6 HOURS SCHEDULED
Status: DISCONTINUED | OUTPATIENT
Start: 2024-09-12 | End: 2024-09-13 | Stop reason: HOSPADM

## 2024-09-12 RX ORDER — ONDANSETRON 2 MG/ML
4 INJECTION INTRAMUSCULAR; INTRAVENOUS EVERY 6 HOURS PRN
Status: DISCONTINUED | OUTPATIENT
Start: 2024-09-12 | End: 2024-09-13 | Stop reason: HOSPADM

## 2024-09-12 RX ORDER — SODIUM CHLORIDE 9 MG/ML
40 INJECTION, SOLUTION INTRAVENOUS AS NEEDED
Status: DISCONTINUED | OUTPATIENT
Start: 2024-09-12 | End: 2024-09-12 | Stop reason: HOSPADM

## 2024-09-12 RX ORDER — ACETAMINOPHEN 500 MG
1000 TABLET ORAL ONCE
Status: COMPLETED | OUTPATIENT
Start: 2024-09-12 | End: 2024-09-12

## 2024-09-12 RX ORDER — DOCUSATE SODIUM 100 MG/1
100 CAPSULE, LIQUID FILLED ORAL 2 TIMES DAILY
Status: DISCONTINUED | OUTPATIENT
Start: 2024-09-12 | End: 2024-09-13 | Stop reason: HOSPADM

## 2024-09-12 RX ORDER — PROMETHAZINE HYDROCHLORIDE 25 MG/1
25 SUPPOSITORY RECTAL ONCE AS NEEDED
Status: DISCONTINUED | OUTPATIENT
Start: 2024-09-12 | End: 2024-09-12 | Stop reason: HOSPADM

## 2024-09-12 RX ORDER — LIDOCAINE HYDROCHLORIDE 10 MG/ML
0.5 INJECTION, SOLUTION EPIDURAL; INFILTRATION; INTRACAUDAL; PERINEURAL ONCE AS NEEDED
Status: COMPLETED | OUTPATIENT
Start: 2024-09-12 | End: 2024-09-12

## 2024-09-12 RX ORDER — FENTANYL CITRATE 50 UG/ML
INJECTION, SOLUTION INTRAMUSCULAR; INTRAVENOUS AS NEEDED
Status: DISCONTINUED | OUTPATIENT
Start: 2024-09-12 | End: 2024-09-12 | Stop reason: SURG

## 2024-09-12 RX ORDER — PROPOFOL 10 MG/ML
VIAL (ML) INTRAVENOUS AS NEEDED
Status: DISCONTINUED | OUTPATIENT
Start: 2024-09-12 | End: 2024-09-12 | Stop reason: SURG

## 2024-09-12 RX ORDER — HYDROCHLOROTHIAZIDE 25 MG/1
12.5 TABLET ORAL DAILY
Status: DISCONTINUED | OUTPATIENT
Start: 2024-09-12 | End: 2024-09-13 | Stop reason: HOSPADM

## 2024-09-12 RX ORDER — PROMETHAZINE HYDROCHLORIDE 12.5 MG/1
12.5 SUPPOSITORY RECTAL EVERY 6 HOURS PRN
Status: DISCONTINUED | OUTPATIENT
Start: 2024-09-12 | End: 2024-09-13 | Stop reason: HOSPADM

## 2024-09-12 RX ORDER — OXYCODONE HYDROCHLORIDE 10 MG/1
10 TABLET ORAL EVERY 4 HOURS PRN
Status: DISCONTINUED | OUTPATIENT
Start: 2024-09-12 | End: 2024-09-13 | Stop reason: HOSPADM

## 2024-09-12 RX ORDER — MIDAZOLAM HYDROCHLORIDE 1 MG/ML
1 INJECTION INTRAMUSCULAR; INTRAVENOUS
Status: DISCONTINUED | OUTPATIENT
Start: 2024-09-12 | End: 2024-09-12 | Stop reason: HOSPADM

## 2024-09-12 RX ADMIN — SODIUM CHLORIDE, POTASSIUM CHLORIDE, SODIUM LACTATE AND CALCIUM CHLORIDE 9 ML/HR: 600; 310; 30; 20 INJECTION, SOLUTION INTRAVENOUS at 11:33

## 2024-09-12 RX ADMIN — Medication 100 MCG: at 13:28

## 2024-09-12 RX ADMIN — SODIUM CHLORIDE 2000 MG: 900 INJECTION INTRAVENOUS at 13:03

## 2024-09-12 RX ADMIN — EPHEDRINE SULFATE 10 MG: 50 INJECTION INTRAVENOUS at 13:38

## 2024-09-12 RX ADMIN — CELECOXIB 200 MG: 200 CAPSULE ORAL at 11:33

## 2024-09-12 RX ADMIN — LISINOPRIL 20 MG: 20 TABLET ORAL at 17:53

## 2024-09-12 RX ADMIN — FAMOTIDINE 20 MG: 20 TABLET, FILM COATED ORAL at 11:33

## 2024-09-12 RX ADMIN — SCOPOLAMINE 1 PATCH: 1.5 PATCH, EXTENDED RELEASE TRANSDERMAL at 11:33

## 2024-09-12 RX ADMIN — Medication 100 MCG: at 13:32

## 2024-09-12 RX ADMIN — ACETAMINOPHEN 650 MG: 325 TABLET ORAL at 17:53

## 2024-09-12 RX ADMIN — PREGABALIN 150 MG: 150 CAPSULE ORAL at 11:33

## 2024-09-12 RX ADMIN — Medication 100 MCG: at 13:20

## 2024-09-12 RX ADMIN — MIDAZOLAM HYDROCHLORIDE 2 MG: 1 INJECTION, SOLUTION INTRAMUSCULAR; INTRAVENOUS at 12:52

## 2024-09-12 RX ADMIN — SUGAMMADEX 200 MG: 100 INJECTION, SOLUTION INTRAVENOUS at 13:50

## 2024-09-12 RX ADMIN — LIDOCAINE HYDROCHLORIDE 50 MG: 10 INJECTION, SOLUTION EPIDURAL; INFILTRATION; INTRACAUDAL; PERINEURAL at 12:57

## 2024-09-12 RX ADMIN — PROPOFOL 25 MCG/KG/MIN: 10 INJECTION, EMULSION INTRAVENOUS at 13:05

## 2024-09-12 RX ADMIN — LIDOCAINE HYDROCHLORIDE 0.5 ML: 10 INJECTION, SOLUTION EPIDURAL; INFILTRATION; INTRACAUDAL; PERINEURAL at 11:33

## 2024-09-12 RX ADMIN — DEXAMETHASONE SODIUM PHOSPHATE 4 MG: 4 INJECTION INTRA-ARTICULAR; INTRALESIONAL; INTRAMUSCULAR; INTRAVENOUS; SOFT TISSUE at 13:03

## 2024-09-12 RX ADMIN — TORSEMIDE 10 MG: 10 TABLET ORAL at 17:53

## 2024-09-12 RX ADMIN — ROCURONIUM BROMIDE 50 MG: 10 INJECTION INTRAVENOUS at 12:58

## 2024-09-12 RX ADMIN — SODIUM CHLORIDE, POTASSIUM CHLORIDE, SODIUM LACTATE AND CALCIUM CHLORIDE 75 ML/HR: 600; 310; 30; 20 INJECTION, SOLUTION INTRAVENOUS at 16:00

## 2024-09-12 RX ADMIN — HYDROCHLOROTHIAZIDE 12.5 MG: 25 TABLET ORAL at 17:53

## 2024-09-12 RX ADMIN — ONDANSETRON 4 MG: 2 INJECTION INTRAMUSCULAR; INTRAVENOUS at 12:55

## 2024-09-12 RX ADMIN — FENTANYL CITRATE 100 MCG: 50 INJECTION, SOLUTION INTRAMUSCULAR; INTRAVENOUS at 12:57

## 2024-09-12 RX ADMIN — SODIUM CHLORIDE, POTASSIUM CHLORIDE, SODIUM LACTATE AND CALCIUM CHLORIDE: 600; 310; 30; 20 INJECTION, SOLUTION INTRAVENOUS at 13:51

## 2024-09-12 RX ADMIN — ALBUTEROL SULFATE 2 PUFF: 90 AEROSOL, METERED RESPIRATORY (INHALATION) at 13:56

## 2024-09-12 RX ADMIN — DOCUSATE SODIUM 100 MG: 100 CAPSULE, LIQUID FILLED ORAL at 20:14

## 2024-09-12 RX ADMIN — PROPOFOL 200 MG: 10 INJECTION, EMULSION INTRAVENOUS at 12:57

## 2024-09-12 RX ADMIN — ACETAMINOPHEN 1000 MG: 500 TABLET ORAL at 11:33

## 2024-09-12 NOTE — OP NOTE
Subjective     Date of Service:  09/12/24  Time of Service:  14:14 EDT    Surgical Staff: Surgeons and Role:     * Isabela Nassar MD - Primary   Additional Staff:   Assistant: Zachary Stephenson PA-C  was responsible for performing the following activities: Retraction, Suction, and Irrigation and their skilled assistance was necessary for the success of this case.     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  Labia minora were not identified.  At the vestibule there was thickened skin extending from the clitoris to the anterior urethral meatus.  There is more involvement on the right than on the left.  Due to findings noted at the time of surgery decision was made to proceed with 1 large simple partial vulvectomy which essentially was the entire right medial side of the vulva and the left anterior vulva including the entire anterior vestibule.  This was a clitoral sparing procedure.   Specimens removed: ID Type Source Tests Collected by Time   A : fresh for orientation-vulva Tissue Vulva TISSUE PATHOLOGY EXAM Isabela Nassar MD 9/12/2024 1325      Fluid Intake and Output: I/O this shift:  In: 1000 [I.V.:1000]  Out: - Estimated blood loss minimal (less than 10 mL)   Blood products used: No   Drains: * No LDAs found *   Implant Information: Nothing was implanted during the procedure   Complications: No immediate   Intraoperative consult(s):    Condition: stable    Disposition: to PACU and then admit to  medical - surgical floor       Indications:  Patient's pleasant 54-year-old woman with a history of vulvar cancer and chronic lichen sclerosis.  She has severe vulvar pain and this has been present for several years and resistant to multiple approaches for treatment.  She was recently seen in [place] and biopsy was performed at the right vulva with carcinoma in situ noted.  It took several weeks for the biopsy site to heal.  Due to this, plan was made for observation and involvement with wound care team.  Risks and benefits of surgery were discussed.  Consent was signed and on chart.    Procedure: After obtaining informed consent, patient was taken to the operating room and underwent general anesthesia after patient and site verification.  Feet were placed in Inderjit stirrups.  The above findings were noted.  Vagina vulva and surrounding structures were prepped and draped in the usual sterile fashion.  Half percent Marcaine was used for local anesthesia.  Marking pen was used to outline area of concern.  Stay sutures were placed circumferentially at the margins.  Suture was secured at the most anterior medial and posterior medial aspects of the resection.  These were respectively approaching the mons and the anterior position and at the anterior urethra at the posterior position.  Scalpel was used to sharply incise the skin at the margin.  Bovie electrocautery with needle tip was used to divide the specimen from the patient.  In order to spare the clitoris, there is an intentional defect in the resection at this area.  This essentially necessitated 2 separate areas be removed although these were contiguous.  Specimens were sent to pathology on the orientation board.  Surgical field was irrigated and aspirated.  Hemostasis was achieved with Bovie electrocautery.  2-0 Vicryl in a interrupted mattress fashion was used to reapproximate the most anterior portion of the excision.   Attempt was made to suture the labia to the periclitoral area bilaterally in a similar fashion.  3-0 Vicryl was used in a subcuticular stitch to close the most anterior 3-1/2 cm of the wound.  However, no further attempt was made to close the incision due to diffuse scarring.  Silvadene was placed.  Mesh panties with ice pack were placed.  Patient was taken to the recovery room in good condition.  There are no immediate complications.  All counts were correct      Isabela Nassar MD  09/12/24  14:13 EDT

## 2024-09-12 NOTE — ANESTHESIA PROCEDURE NOTES
Airway  Urgency: elective    Date/Time: 9/12/2024 1:00 PM  Airway not difficult    General Information and Staff    Patient location during procedure: OR    Indications and Patient Condition  Indications for airway management: airway protection    Preoxygenated: yes  MILS not maintained throughout  Mask difficulty assessment: 2 - vent by mask + OA or adjuvant +/- NMBA    Final Airway Details  Final airway type: endotracheal airway      Successful airway: ETT  Cuffed: yes   Successful intubation technique: direct laryngoscopy  Facilitating devices/methods: intubating stylet  Endotracheal tube insertion site: oral  Blade: Dona  Blade size: 3  ETT size (mm): 7.0  Cormack-Lehane Classification: grade I - full view of glottis  Placement verified by: chest auscultation and capnometry   Cuff volume (mL): 6  Measured from: lips  ETT/EBT  to lips (cm): 20  Number of attempts at approach: 1  Assessment: lips, teeth, and gum same as pre-op and atraumatic intubation    Additional Comments  Negative epigastric sounds, Breath sound equal bilaterally with symmetric chest rise and fall

## 2024-09-12 NOTE — ANESTHESIA PREPROCEDURE EVALUATION
Anesthesia Evaluation     Patient summary reviewed and Nursing notes reviewed   no history of anesthetic complications:   NPO Solid Status: > 8 hours  NPO Liquid Status: > 2 hours           Airway   Mallampati: II  TM distance: >3 FB  Neck ROM: full  No difficulty expected  Dental - normal exam     Pulmonary    (-) COPD, asthma, recent URI, sleep apnea  Cardiovascular     ECG reviewed    (+) hypertension, hyperlipidemia  (-) dysrhythmias, angina, CHF      Neuro/Psych  (+) seizures  GI/Hepatic/Renal/Endo    (+) obesity  (-) liver disease, no renal disease, diabetes, no thyroid disorder    Musculoskeletal     Abdominal    Substance History      OB/GYN          Other      history of cancer (vulvar)                  Anesthesia Plan    ASA 3     general     intravenous induction     Anesthetic plan, risks, benefits, and alternatives have been provided, discussed and informed consent has been obtained with: patient.  Pre-procedure education provided  Plan discussed with CRNA.      CODE STATUS:

## 2024-09-12 NOTE — ANESTHESIA POSTPROCEDURE EVALUATION
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])    Surgeons: Isabela Nassar MD Provider: Darren Pizarro MD    Anesthesia Type: general ASA Status: 3            Anesthesia Type: general    Vitals  Vitals Value Taken Time   BP 93/58 09/12/24 1413   Temp 97.5 °F (36.4 °C) 09/12/24 1413   Pulse 81 09/12/24 1413   Resp 14 09/12/24 1413   SpO2 92 % 09/12/24 1413           Post Anesthesia Care and Evaluation    Patient location during evaluation: PACU  Level of consciousness: lethargic  Pain management: adequate    Airway patency: patent  Anesthetic complications: No anesthetic complications  PONV Status: none  Cardiovascular status: hemodynamically stable and acceptable  Respiratory status: acceptable, spontaneous ventilation, nonlabored ventilation, oral airway and face mask  Hydration status: acceptable

## 2024-09-12 NOTE — INTERVAL H&P NOTE
"[facility] Pre-op    Full history and physical note from office is attached.    /86 (BP Location: Right arm, Patient Position: Lying)   Pulse 61   Temp 98.3 °F (36.8 °C) (Temporal)   Resp 16   Ht 154.9 cm (61\")   Wt 73.9 kg (163 lb)   LMP  (LMP Unknown)   SpO2 98%   BMI 30.80 kg/m²     Review of Systems:  Constitutional-- No fever, chills or sweats. No fatigue.  CV-- No chest pain, palpitation or syncope  Resp-- No SOB, cough, hemoptysis  Skin--No rashes or lesions    Physical Exam:  Heart:   Regular rate and rhythm, S1 and S2 normal  Lungs: Clear to auscultation bilaterally, respirations unlabored    LAB Results:  Lab Results   Component Value Date    WBC 6.31 09/05/2024    HGB 14.1 09/05/2024    HCT 41.7 09/05/2024    MCV 93.3 09/05/2024     09/05/2024    NEUTROABS 4.66 08/27/2024    GLUCOSE 98 09/05/2024    BUN 18 09/05/2024    CREATININE 1.08 (H) 09/05/2024    EGFRIFNONA 77 10/18/2021    EGFRIFAFRI 84 10/28/2020     09/05/2024    K 3.9 09/05/2024     09/05/2024    CO2 27.0 09/05/2024    MG 2.1 01/06/2023    CALCIUM 9.5 09/05/2024    ALBUMIN 4.4 09/05/2024    AST 19 09/05/2024    ALT 21 09/05/2024    BILITOT 0.3 09/05/2024    PTT 27.5 09/27/2017    INR 0.90 09/27/2017       Cancer Staging (if applicable)  Cancer Patient: __ yes __no __unknown_x_N/A; If yes, clinical stage T:__ N:__M:__, stage group or __N/A      Impression:  ARGENTINA III (vulvar intraepithelial neoplasia III)       Plan: SIMPLE PARTIAL VULVECTOMY x2       ISAIAH Minor   9/12/2024   11:10 EDT    I saw and evaluated the patient. I agree with the findings and the plan of care as documented in the note.    Isabela Nassar MD  09/12/24  12:12 EDT    "

## 2024-09-13 ENCOUNTER — TELEPHONE (OUTPATIENT)
Dept: GYNECOLOGIC ONCOLOGY | Facility: CLINIC | Age: 54
End: 2024-09-13
Payer: COMMERCIAL

## 2024-09-13 VITALS
RESPIRATION RATE: 16 BRPM | SYSTOLIC BLOOD PRESSURE: 109 MMHG | WEIGHT: 163 LBS | OXYGEN SATURATION: 97 % | DIASTOLIC BLOOD PRESSURE: 62 MMHG | HEART RATE: 49 BPM | TEMPERATURE: 97.7 F | HEIGHT: 61 IN | BODY MASS INDEX: 30.78 KG/M2

## 2024-09-13 LAB
ANION GAP SERPL CALCULATED.3IONS-SCNC: 10 MMOL/L (ref 5–15)
BASOPHILS # BLD AUTO: 0.03 10*3/MM3 (ref 0–0.2)
BASOPHILS NFR BLD AUTO: 0.4 % (ref 0–1.5)
BUN SERPL-MCNC: 11 MG/DL (ref 6–20)
BUN/CREAT SERPL: 12.1 (ref 7–25)
CALCIUM SPEC-SCNC: 8.7 MG/DL (ref 8.6–10.5)
CHLORIDE SERPL-SCNC: 107 MMOL/L (ref 98–107)
CO2 SERPL-SCNC: 25 MMOL/L (ref 22–29)
CREAT SERPL-MCNC: 0.91 MG/DL (ref 0.57–1)
DEPRECATED RDW RBC AUTO: 41.5 FL (ref 37–54)
EGFRCR SERPLBLD CKD-EPI 2021: 75.1 ML/MIN/1.73
EOSINOPHIL # BLD AUTO: 0.06 10*3/MM3 (ref 0–0.4)
EOSINOPHIL NFR BLD AUTO: 0.8 % (ref 0.3–6.2)
ERYTHROCYTE [DISTWIDTH] IN BLOOD BY AUTOMATED COUNT: 12 % (ref 12.3–15.4)
GLUCOSE SERPL-MCNC: 144 MG/DL (ref 65–99)
HCT VFR BLD AUTO: 32.9 % (ref 34–46.6)
HGB BLD-MCNC: 10.9 G/DL (ref 12–15.9)
IMM GRANULOCYTES # BLD AUTO: 0.02 10*3/MM3 (ref 0–0.05)
IMM GRANULOCYTES NFR BLD AUTO: 0.3 % (ref 0–0.5)
LYMPHOCYTES # BLD AUTO: 0.94 10*3/MM3 (ref 0.7–3.1)
LYMPHOCYTES NFR BLD AUTO: 11.9 % (ref 19.6–45.3)
MCH RBC QN AUTO: 31.3 PG (ref 26.6–33)
MCHC RBC AUTO-ENTMCNC: 33.1 G/DL (ref 31.5–35.7)
MCV RBC AUTO: 94.5 FL (ref 79–97)
MONOCYTES # BLD AUTO: 0.48 10*3/MM3 (ref 0.1–0.9)
MONOCYTES NFR BLD AUTO: 6.1 % (ref 5–12)
NEUTROPHILS NFR BLD AUTO: 6.4 10*3/MM3 (ref 1.7–7)
NEUTROPHILS NFR BLD AUTO: 80.5 % (ref 42.7–76)
NRBC BLD AUTO-RTO: 0 /100 WBC (ref 0–0.2)
PLATELET # BLD AUTO: 214 10*3/MM3 (ref 140–450)
PMV BLD AUTO: 10.7 FL (ref 6–12)
POTASSIUM SERPL-SCNC: 4.1 MMOL/L (ref 3.5–5.2)
RBC # BLD AUTO: 3.48 10*6/MM3 (ref 3.77–5.28)
SODIUM SERPL-SCNC: 142 MMOL/L (ref 136–145)
WBC NRBC COR # BLD AUTO: 7.93 10*3/MM3 (ref 3.4–10.8)

## 2024-09-13 PROCEDURE — 97535 SELF CARE MNGMENT TRAINING: CPT

## 2024-09-13 PROCEDURE — 97602 WOUND(S) CARE NON-SELECTIVE: CPT

## 2024-09-13 PROCEDURE — 85025 COMPLETE CBC W/AUTO DIFF WBC: CPT | Performed by: OBSTETRICS & GYNECOLOGY

## 2024-09-13 PROCEDURE — 25810000003 LACTATED RINGERS PER 1000 ML: Performed by: OBSTETRICS & GYNECOLOGY

## 2024-09-13 PROCEDURE — 97162 PT EVAL MOD COMPLEX 30 MIN: CPT

## 2024-09-13 PROCEDURE — 80048 BASIC METABOLIC PNL TOTAL CA: CPT | Performed by: OBSTETRICS & GYNECOLOGY

## 2024-09-13 PROCEDURE — 25010000002 HEPARIN (PORCINE) PER 1000 UNITS: Performed by: OBSTETRICS & GYNECOLOGY

## 2024-09-13 RX ORDER — ACETAMINOPHEN 325 MG/1
650 TABLET ORAL EVERY 6 HOURS SCHEDULED
Qty: 60 TABLET | Refills: 1 | Status: SHIPPED | OUTPATIENT
Start: 2024-09-13

## 2024-09-13 RX ORDER — OXYCODONE HYDROCHLORIDE 5 MG/1
5 TABLET ORAL EVERY 4 HOURS PRN
Qty: 15 TABLET | Refills: 0 | Status: SHIPPED | OUTPATIENT
Start: 2024-09-13 | End: 2024-09-20 | Stop reason: SDUPTHER

## 2024-09-13 RX ORDER — POLYETHYLENE GLYCOL 3350 17 G/17G
17 POWDER, FOR SOLUTION ORAL DAILY
Start: 2024-09-13

## 2024-09-13 RX ORDER — PSEUDOEPHEDRINE HCL 30 MG
100 TABLET ORAL 2 TIMES DAILY
Qty: 60 CAPSULE | Refills: 2 | Status: SHIPPED | OUTPATIENT
Start: 2024-09-13

## 2024-09-13 RX ADMIN — LISINOPRIL 20 MG: 20 TABLET ORAL at 09:21

## 2024-09-13 RX ADMIN — HEPARIN SODIUM 5000 UNITS: 5000 INJECTION INTRAVENOUS; SUBCUTANEOUS at 06:04

## 2024-09-13 RX ADMIN — OXYCODONE HYDROCHLORIDE 5 MG: 5 TABLET ORAL at 12:51

## 2024-09-13 RX ADMIN — HYDROCHLOROTHIAZIDE 12.5 MG: 25 TABLET ORAL at 09:21

## 2024-09-13 RX ADMIN — ACETAMINOPHEN 650 MG: 325 TABLET ORAL at 06:04

## 2024-09-13 RX ADMIN — DOCUSATE SODIUM 100 MG: 100 CAPSULE, LIQUID FILLED ORAL at 09:21

## 2024-09-13 RX ADMIN — ESTRADIOL 0.5 MG: 0.5 TABLET ORAL at 09:21

## 2024-09-13 RX ADMIN — SODIUM CHLORIDE, POTASSIUM CHLORIDE, SODIUM LACTATE AND CALCIUM CHLORIDE 75 ML/HR: 600; 310; 30; 20 INJECTION, SOLUTION INTRAVENOUS at 03:08

## 2024-09-13 RX ADMIN — ACETAMINOPHEN 650 MG: 325 TABLET ORAL at 12:52

## 2024-09-13 RX ADMIN — OXYCODONE HYDROCHLORIDE 5 MG: 5 TABLET ORAL at 03:07

## 2024-09-13 RX ADMIN — PRAVASTATIN SODIUM 20 MG: 20 TABLET ORAL at 09:21

## 2024-09-13 RX ADMIN — ACETAMINOPHEN 650 MG: 325 TABLET ORAL at 00:11

## 2024-09-13 RX ADMIN — TORSEMIDE 10 MG: 10 TABLET ORAL at 09:21

## 2024-09-13 NOTE — THERAPY WOUND CARE TREATMENT
Acute Care - Wound/Debridement Initial Evaluation  [facility]     Patient Name: Edyta Franco  : 1970  MRN: [id number]  Today's Date: 2024                Admit Date: 2024    Visit Dx:    ICD-10-CM ICD-9-CM   1. Encounter for postoperative wound care  Z48.89 V58.49   2. ARGENTINA III (vulvar intraepithelial neoplasia III)  D07.1 233.32   3. Vulvar pain  R10.2 625.9       Patient Active Problem List   Diagnosis    Hypertension, essential    Dyslipidemia    Myoclonic seizure    Familial hypercholesterolemia    Fibromyalgia    Flu vaccine need    BMI 30.0-30.9,adult    Venous insufficiency of both lower extremities    Palpitations    Epilepsy    Malignant neoplasm of vulva, unspecified    Lichen sclerosus et atrophicus    Vulvar pruritus    Tarsal tunnel syndrome of right side    Infection due to Streptococcus agalactiae    Joint disorder of hand    Leg pain, left    Neutrophilic leukemoid reaction    Other obesity due to excess calories    Pain due to vascular prosthetic devices, implants and grafts, initial encounter    Thrombocytopenia, secondary    Thumb pain    Pain of finger    Pain in right hip    Arthralgia of right ankle    Cellulitis of groin    Neck muscle spasm    Abnormal screening cardiac CT    Family history of cardiac disorder    ARGENTINA III (vulvar intraepithelial neoplasia III)    Vulvar pain        Past Medical History:   Diagnosis Date    Epilepsy 2020    Patient reported she was taken off her medication for this apx 10 years ago.  Reported no seizure activity for apx. 8 years ago.      Fatigue     Hypercholesteremia     Hypertension     Lichen sclerosus of female genitalia     Rectal bleeding 2020    Spitz nevus     EXCISION ON 24    Varicella 1974    Vulva cancer     Vulvar cancer 2017    Wears contact lenses 2020    Patient advised contacts will need to be removed DOS        Past Surgical History:   Procedure Laterality Date     SECTION       COLONOSCOPY N/A 03/23/2020    Procedure: COLONOSCOPY WITH BIOPSY;  Surgeon: Eleni Russell MD;  Location: [facility] ENDOSCOPY;  Service: Gastroenterology;  Laterality: N/A;    HAND SURGERY Right 2014    LUMBAR LAMINECTOMY  12/13/2011    HEMILAMI RT-C7-T1, RT C7-T1 MEDIAL FACET & CT-T1 DISC (Dr. Cabrera    LYMPH NODE BIOPSY      ORIF ANKLE FRACTURE Right     TOTAL ABDOMINAL HYSTERECTOMY WITH SALPINGO OOPHORECTOMY  2001    LANDON Meneses     VULVA BIOPSY      VULVA SURGERY  06/04/2024    biopsy  for Lichens    VULVECTOMY N/A 08/30/2017    Procedure: MODIFIED RADICAL VULVECTOMY, BILATERAL SENTINAL LYMPH NODE DISSECTION, MAPPING BIOPSIES OF VULVA;  Surgeon: Isabela Nassar MD;  Location:  [facility] OR;  Service:     VULVECTOMY N/A 9/12/2024    Procedure: SIMPLE PARTIAL VULVECTOMY;  Surgeon: Isabela Nassar MD;  Location:  [facility] OR;  Service: Gynecology Oncology;  Laterality: N/A;           Wound 09/12/24 1306 vagina Incision (Active)   Dressing Appearance open to air 09/13/24 0800   Closure Sutures;Open to air 09/13/24 0800   Base moist;red 09/13/24 1020   Periwound intact;dry;edematous 09/13/24 0800   Periwound Temperature warm 09/13/24 0800   Periwound Skin Turgor soft 09/13/24 0800   Drainage Characteristics/Odor serosanguineous 09/13/24 1020   Drainage Amount small 09/13/24 1020   Care, Wound cleansed with;wound cleanser 09/13/24 1020   Dressing Care open to air 09/13/24 0800   Periwound Care dry periwound area maintained 09/13/24 0800         WOUND DEBRIDEMENT                     PT Assessment (Last 12 Hours)       PT Evaluation and Treatment       Row Name 09/13/24 1020          Physical Therapy Time and Intention    Subjective Information no complaints  -KW     Document Type wound care;evaluation  -KW     Mode of Treatment physical therapy  -KW       Row Name 09/13/24 1020          General Information    Patient Profile Reviewed yes  -KW       Row Name 09/13/24 1020          Wound 09/12/24  1306 vagina Incision    Wound - Properties Group Placement Date: 09/12/24  -CC Placement Time: 1306  -CC Location: vagina  -CC Primary Wound Type: Incision  -CC    Base moist;red  -KW     Drainage Characteristics/Odor serosanguineous  -KW     Drainage Amount small  -KW     Care, Wound cleansed with;wound cleanser  HFBt, interdry  -KW     Retired Wound - Properties Group Placement Date: 09/12/24  -CC Placement Time: 1306  -CC Location: vagina  -CC Primary Wound Type: Incision  -CC    Retired Wound - Properties Group Date first assessed: 09/12/24  -CC Time first assessed: 1306  -CC Location: vagina  -CC Primary Wound Type: Incision  -CC      Row Name 09/13/24 1020          Plan of Care Review    Plan of Care Reviewed With patient  -KW     Progress no change  -KW     Outcome Evaluation PT wound care consulted to assess vulvar incision. PT placed HFBt to base of wound and interdry to B groin to improve granulation and wound healing. Patient and  provided extensive education about wound management. She would continue to benefit from skilled PT services to improve wound healing.  -KW       Row Name 09/13/24 1020          Positioning and Restraints    Pre-Treatment Position in bed  -KW     Post Treatment Position bed  -KW     In Bed supine;encouraged to call for assist;call light within reach  -KW               User Key  (r) = Recorded By, (t) = Taken By, (c) = Cosigned By      Initials Name Provider Type    Kim Pratt, PT Physical Therapist    CC Gaby Silva RN Registered Nurse                      Recommendation and Plan     Plan of Care Reviewed With: patient   Progress: no change       Progress: no change  Outcome Evaluation: PT wound care consulted to assess vulvar incision. PT placed HFBt to base of wound and interdry to B groin to improve granulation and wound healing. Patient and  provided extensive education about wound management. She would continue to benefit from skilled PT  services to improve wound healing.  Plan of Care Reviewed With: patient            Time Calculation   PT Charges       Row Name 09/13/24 1020             Time Calculation    Start Time 1020  -KW         Timed Charges    14796 - PT Self Care/Mgmt Minutes 25  -KW         Untimed Charges    PT Eval/Re-eval Minutes 32  -KW      Wound Care 05905 Non-selective debridement  -KW      97602-Non-selective debridement 15  -KW         Total Minutes    Timed Charges Total Minutes 25  -KW      Untimed Charges Total Minutes 47  -KW       Total Minutes 72  -KW                User Key  (r) = Recorded By, (t) = Taken By, (c) = Cosigned By      Initials Name Provider Type    Kim Pratt, PT Physical Therapist                      Therapy Charges for Today       Code Description Service Date Service Provider Modifiers Qty    57405281513 HC NONSELECTIVE DEBRIDEMENT 9/13/2024 Kim Tapia, PT GP 1    82611695185 HC PT EVAL MOD COMPLEXITY 3 9/13/2024 Kim Tapia, PT GP 1    61419518687 HC PT SELF CARE/MGMT/TRAIN EA 15 MIN 9/13/2024 Kim Tapia, PT GP 1              PT G-Codes  AM-PAC 6 Clicks Score (PT): 24       Kim Tapia PT  9/13/2024

## 2024-09-13 NOTE — TELEPHONE ENCOUNTER
Patient called, just d/c from hospital and only home a short time. Encouraged to please eat good protein and drink plenty of fluids.  No lifting/pushing/pulling and rest as needed.  To pat after urination not wipe and take laxatives as needed.  Offers ty for great care.

## 2024-09-13 NOTE — PROGRESS NOTES
Gynecologic Oncology   Daily Progress Note    Chief Complaint: post op    Subjective   Patient did well overnight.  Her pain is controlled.  She is not having nausea or emesis.  She is tolerating a regular diet.  She is voiding spontaneously and she had a bowel movement last night. She is ambulating.      Inpatient Medications:     Current Facility-Administered Medications:     acetaminophen (TYLENOL) tablet 650 mg, 650 mg, Oral, Q6H, Isabela Nassar MD, 650 mg at 09/13/24 0604    docusate sodium (COLACE) capsule 100 mg, 100 mg, Oral, BID, Isabela Nassar MD, 100 mg at 09/13/24 0921    estradiol (ESTRACE) tablet 0.5 mg, 0.5 mg, Oral, Daily, Isabela Nassar MD, 0.5 mg at 09/13/24 0921    heparin (porcine) 5000 UNIT/ML injection 5,000 Units, 5,000 Units, Subcutaneous, Q8H, Isabela Nassar MD, 5,000 Units at 09/13/24 0604    hydroCHLOROthiazide tablet 12.5 mg, 12.5 mg, Oral, Daily, Isabela Nassar MD, 12.5 mg at 09/13/24 0921    HYDROmorphone (DILAUDID) injection 0.5 mg, 0.5 mg, Intravenous, Q2H PRN **AND** naloxone (NARCAN) injection 0.4 mg, 0.4 mg, Intravenous, Q5 Min PRN, Isabela Nassar MD    HYDROmorphone (DILAUDID) injection 0.5 mg, 0.5 mg, Intravenous, Q2H PRN **AND** naloxone (NARCAN) injection 0.1 mg, 0.1 mg, Intravenous, Q5 Min PRN, Isabela Nassar MD    lactated ringers infusion, 75 mL/hr, Intravenous, Continuous, Isabela Nassar MD, Stopped at 09/13/24 0736    lisinopril (PRINIVIL,ZESTRIL) tablet 20 mg, 20 mg, Oral, Daily, Isabela Nassar MD, 20 mg at 09/13/24 0921    ondansetron ODT (ZOFRAN-ODT) disintegrating tablet 4 mg, 4 mg, Oral, Q6H PRN **OR** ondansetron (ZOFRAN) injection 4 mg, 4 mg, Intravenous, Q6H PRN, Isabela Nassar MD    oxyCODONE (ROXICODONE) immediate release tablet 10 mg, 10 mg, Oral, Q4H PRN, Isabela Nassar MD    oxyCODONE (ROXICODONE) immediate release tablet 5 mg, 5 mg, Oral, Q4H PRN, Isabela Nassar MD, 5 mg at 09/13/24 0307    pravastatin  (PRAVACHOL) tablet 20 mg, 20 mg, Oral, Daily, Isabela Nassar MD, 20 mg at 09/13/24 0921    promethazine (PHENERGAN) suppository 12.5 mg, 12.5 mg, Rectal, Q6H PRN **OR** promethazine (PHENERGAN) tablet 12.5 mg, 12.5 mg, Oral, Q6H PRN, Isabela Nassar MD    scopolamine patch 1 mg/72 hr, 1 patch, Transdermal, Continuous, Isabela Nassar MD, 1 patch at 09/12/24 1133    simethicone (MYLICON) chewable tablet 80 mg, 80 mg, Oral, 4x Daily PRN, Isabela Nassar MD    temazepam (RESTORIL) capsule 15 mg, 15 mg, Oral, Nightly PRN, Isabela Nassar MD    torsemide (DEMADEX) tablet 10 mg, 10 mg, Oral, Daily, Isabela Nassar MD, 10 mg at 09/13/24 0921     Objective   Temp:  [96.7 °F (35.9 °C)-98.3 °F (36.8 °C)] 97.7 °F (36.5 °C)  Heart Rate:  [49-85] 49  Resp:  [14-16] 16  BP: ()/(54-86) 109/62  Vitals:    09/13/24 0919   BP: 109/62   Pulse: (!) 49   Resp:    Temp:    SpO2: 97%     I/O last 3 completed shifts:  In: 1400 [P.O.:400; I.V.:1000]  Out: 1550 [Urine:1550]     GENERAL: Alert, well-appearing female in no apparent distress.    CARDIOVASCULAR: No extremity edema  RESPIRATORY:  normal respiratory effort  GASTROINTESTINAL: Deferred  GENITOURINARY: incision with ecchymosis,  edema - sutures intact  SKIN:  Warm, dry, well-perfused.    PSYCHIATRIC: AO x3, with appropriate affect, normal thought processes    Lab Results   Component Value Date    WBC 7.93 09/13/2024    HGB 10.9 (L) 09/13/2024    HCT 32.9 (L) 09/13/2024    MCV 94.5 09/13/2024     09/13/2024    NEUTROABS 6.40 09/13/2024    GLUCOSE 144 (H) 09/13/2024    BUN 11 09/13/2024    CREATININE 0.91 09/13/2024    EGFRIFNONA 77 10/18/2021    EGFRIFAFRI 84 10/28/2020     09/13/2024    K 4.1 09/13/2024     09/13/2024    CO2 25.0 09/13/2024    MG 2.1 01/06/2023    CALCIUM 8.7 09/13/2024         Assessment & Plan   Edyta Franco is a 54 y.o. female POD1 s/p near complete vulvar simple partial vulvectomy    1.  Post-operative care  -Await  wound care recommendations; history of poor healing, chronic wound after biopsy  -Pain well-controlled    3.  Disposition  -Discharge after wound care plan in place.         Isabela Nassar MD  09/13/24  09:45 EDT

## 2024-09-13 NOTE — PLAN OF CARE
Goal Outcome Evaluation:  Plan of Care Reviewed With: patient, spouse        Progress: improving  Outcome Evaluation: Pt resting comfortably on shift. IV site clean, dry, in tact with LR running at 75 ml/hr. Surgical incision in tact with sutures, ice pack pads and mesh panties in place. Pt reports intermittent burning and itching in vaginal area particularly when voiding. Pt. voiding appropriately with blood tinged urine.Ambulating with no issues.1x PRN PO dose of roxicodone given for pain. No N/V reported. tolerating food well. VSS on RA. Nurse will continue the plan of care and has no concerns to report at this time.

## 2024-09-13 NOTE — PLAN OF CARE
Goal Outcome Evaluation:  Plan of Care Reviewed With: patient        Progress: no change  Outcome Evaluation: PT wound care consulted to assess vulvar incision. PT placed HFBt to base of wound and interdry to B groin to improve granulation and wound healing. Patient and  provided extensive education about wound management. She would continue to benefit from skilled PT services to improve wound healing.

## 2024-09-13 NOTE — PLAN OF CARE
Goal Outcome Evaluation:                        VSS on Ra. Pain managed with scheduled meds and prn oxycodone. PT wound care worked with patient to establish home wound care routine, patient has follow-up scheduled with outpatient PT wound care. Labial incision is clean, dry, intact, edematous; patient wearing pads, adequate UOP. Discharge information packet sent home with patient. Discharge education included medication indication, frequency, dosage, side effects, signs/symptoms of surgical site infection, provider follow-up. Care ongoing, continue plan of care.

## 2024-09-13 NOTE — CASE MANAGEMENT/SOCIAL WORK
Case Management Discharge Note      Final Note: Home         Selected Continued Care - Admitted Since 9/12/2024       Destination    No services have been selected for the patient.                Durable Medical Equipment    No services have been selected for the patient.                Dialysis/Infusion    No services have been selected for the patient.                Home Medical Care    No services have been selected for the patient.                Therapy    No services have been selected for the patient.                Community Resources    No services have been selected for the patient.                Community & DME    No services have been selected for the patient.                         Final Discharge Disposition Code: 01 - home or self-care

## 2024-09-16 ENCOUNTER — TELEPHONE (OUTPATIENT)
Dept: GYNECOLOGIC ONCOLOGY | Facility: CLINIC | Age: 54
End: 2024-09-16
Payer: COMMERCIAL

## 2024-09-17 ENCOUNTER — HOSPITAL ENCOUNTER (OUTPATIENT)
Dept: PHYSICAL THERAPY | Facility: HOSPITAL | Age: 54
Setting detail: THERAPIES SERIES
Discharge: HOME | End: 2024-09-17
Payer: COMMERCIAL

## 2024-09-17 DIAGNOSIS — S31.40XD OPEN WOUND OF VULVA WITH COMPLICATION, SUBSEQUENT ENCOUNTER: Primary | ICD-10-CM

## 2024-09-17 PROCEDURE — 97597 DBRDMT OPN WND 1ST 20 CM/<: CPT

## 2024-09-17 PROCEDURE — 97162 PT EVAL MOD COMPLEX 30 MIN: CPT

## 2024-09-20 ENCOUNTER — OFFICE VISIT (OUTPATIENT)
Dept: GYNECOLOGIC ONCOLOGY | Facility: CLINIC | Age: 54
End: 2024-09-20
Payer: COMMERCIAL

## 2024-09-20 VITALS
TEMPERATURE: 97.1 F | RESPIRATION RATE: 17 BRPM | BODY MASS INDEX: 31.21 KG/M2 | OXYGEN SATURATION: 99 % | DIASTOLIC BLOOD PRESSURE: 84 MMHG | HEIGHT: 61 IN | WEIGHT: 165.3 LBS | HEART RATE: 57 BPM | SYSTOLIC BLOOD PRESSURE: 156 MMHG

## 2024-09-20 DIAGNOSIS — L90.0 LICHEN SCLEROSUS ET ATROPHICUS: Primary | ICD-10-CM

## 2024-09-20 DIAGNOSIS — D07.1 VIN III (VULVAR INTRAEPITHELIAL NEOPLASIA III): ICD-10-CM

## 2024-09-20 DIAGNOSIS — Z98.890 POST-OPERATIVE STATE: ICD-10-CM

## 2024-09-20 PROCEDURE — 99024 POSTOP FOLLOW-UP VISIT: CPT | Performed by: OBSTETRICS & GYNECOLOGY

## 2024-09-20 RX ORDER — OXYCODONE HYDROCHLORIDE 5 MG/1
5 TABLET ORAL EVERY 4 HOURS PRN
Qty: 15 TABLET | Refills: 0 | Status: SHIPPED | OUTPATIENT
Start: 2024-09-20

## 2024-09-24 ENCOUNTER — HOSPITAL ENCOUNTER (OUTPATIENT)
Dept: PHYSICAL THERAPY | Facility: HOSPITAL | Age: 54
Setting detail: THERAPIES SERIES
Discharge: HOME | End: 2024-09-24
Payer: COMMERCIAL

## 2024-09-24 DIAGNOSIS — S31.40XD OPEN WOUND OF VULVA WITH COMPLICATION, SUBSEQUENT ENCOUNTER: Primary | ICD-10-CM

## 2024-09-24 PROCEDURE — 97597 DBRDMT OPN WND 1ST 20 CM/<: CPT

## 2024-09-27 RX ORDER — OXYCODONE HYDROCHLORIDE 5 MG/1
5 TABLET ORAL EVERY 4 HOURS PRN
Qty: 15 TABLET | Refills: 0 | Status: SHIPPED | OUTPATIENT
Start: 2024-09-27

## 2024-09-30 ENCOUNTER — OFFICE VISIT (OUTPATIENT)
Dept: GYNECOLOGIC ONCOLOGY | Facility: CLINIC | Age: 54
End: 2024-09-30
Payer: COMMERCIAL

## 2024-09-30 VITALS
SYSTOLIC BLOOD PRESSURE: 139 MMHG | TEMPERATURE: 98.2 F | WEIGHT: 167.8 LBS | DIASTOLIC BLOOD PRESSURE: 84 MMHG | HEIGHT: 61 IN | OXYGEN SATURATION: 97 % | RESPIRATION RATE: 18 BRPM | BODY MASS INDEX: 31.68 KG/M2 | HEART RATE: 74 BPM

## 2024-09-30 DIAGNOSIS — D07.1 VIN III (VULVAR INTRAEPITHELIAL NEOPLASIA III): ICD-10-CM

## 2024-09-30 DIAGNOSIS — Z98.890 POST-OPERATIVE STATE: Primary | ICD-10-CM

## 2024-09-30 PROCEDURE — 99024 POSTOP FOLLOW-UP VISIT: CPT | Performed by: OBSTETRICS & GYNECOLOGY

## 2024-09-30 NOTE — PROGRESS NOTES
"Edyta Franco  [id number]  1970      Reason for Visit:  Postoperative evaluation    History of Present Illness:  Patient is a very pleasant 54 y.o. woman who presents for a post operative evaluation status post extensive simple partial vulvectomy performed on 9/12/2024.      Pain overall improving.  Has been performing wound care with silver impregnated dressing.  This is going well    Past Medical History, Past Surgical History, Social History, Family History have been reviewed and are without significant changes except as mentioned.    Review of Systems   All other systems were reviewed and are negative except as mentioned above.    Medications:  The current medication list was reviewed in the EMR    ALLERGIES:    Allergies   Allergen Reactions    Penicillins Hives         /84   Pulse 74   Temp 98.2 °F (36.8 °C) (Temporal)   Resp 18   Ht 154.9 cm (61\")   Wt 76.1 kg (167 lb 12.8 oz)   LMP  (LMP Unknown)   SpO2 97%   BMI 31.71 kg/m²   ECOG score: 1       Physical Exam  Constitutional:  Patient is a pleasant woman in no acute distress.  Extremities:  Bilateral lower extremities are non-tender.  Gynecologic: Anterior aspect of the incision is intact.  At more inferior aspect, healing very well, healthy granulation tissue, some retained sutures removed, no exudate.  Dressing replaced.    PATHOLOGY:  Pending    ASSESSMENT/PLAN:  Edyta Franco returns for a post-operative evaluation today.  All pathology reports were discussed with the patient.  No diagnosis found.        Overall, the patient is very pleased with her care.  I recommended continuation of post operative precautions as discussed.     I am going to see her back in 2-3 weeks.  Patient going to reach out to wound care to see if she needs any further follow-up with them since she is healing so well.    Isabela Nassar MD  09/30/24  13:00 EDT      "

## 2024-10-01 LAB
CYTO UR: NORMAL
LAB AP CASE REPORT: NORMAL
LAB AP CLINICAL INFORMATION: NORMAL
LAB AP DIAGNOSIS COMMENT: NORMAL
PATH REPORT.FINAL DX SPEC: NORMAL
PATH REPORT.GROSS SPEC: NORMAL

## 2024-10-02 ENCOUNTER — TELEPHONE (OUTPATIENT)
Dept: GYNECOLOGIC ONCOLOGY | Facility: CLINIC | Age: 54
End: 2024-10-02
Payer: COMMERCIAL

## 2024-10-02 NOTE — TELEPHONE ENCOUNTER
I called pt to discuss final pathology.  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  Multiple positive margins reviewed.  Patient verbalized understanding of the discussion and will follow-up as scheduled.  Observation.  Electronically signed by Isabela Nassar MD, 10/02/24, 6:00 PM EDT.

## 2024-10-03 DIAGNOSIS — I10 HYPERTENSION, ESSENTIAL: ICD-10-CM

## 2024-10-03 DIAGNOSIS — I87.2 VENOUS INSUFFICIENCY OF BOTH LOWER EXTREMITIES: ICD-10-CM

## 2024-10-03 DIAGNOSIS — I10 HYPERTENSION, ESSENTIAL, BENIGN: ICD-10-CM

## 2024-10-03 RX ORDER — HYDROCHLOROTHIAZIDE 12.5 MG/1
TABLET ORAL
Qty: 90 TABLET | Refills: 0 | Status: SHIPPED | OUTPATIENT
Start: 2024-10-03

## 2024-10-03 RX ORDER — LISINOPRIL 20 MG/1
TABLET ORAL
Qty: 90 TABLET | Refills: 0 | Status: SHIPPED | OUTPATIENT
Start: 2024-10-03

## 2024-10-03 RX ORDER — TORSEMIDE 10 MG/1
TABLET ORAL
Qty: 90 TABLET | Refills: 0 | Status: SHIPPED | OUTPATIENT
Start: 2024-10-03

## 2024-10-08 LAB
CYTO UR: NORMAL
LAB AP CASE REPORT: NORMAL
LAB AP CLINICAL INFORMATION: NORMAL
LAB AP OUTSIDE REPORT, ADDENDUM: NORMAL
Lab: NORMAL
Lab: NORMAL
PATH REPORT.FINAL DX SPEC: NORMAL
PATH REPORT.GROSS SPEC: NORMAL

## 2024-10-11 ENCOUNTER — TELEPHONE (OUTPATIENT)
Dept: GYNECOLOGIC ONCOLOGY | Facility: CLINIC | Age: 54
End: 2024-10-11
Payer: COMMERCIAL

## 2024-10-11 NOTE — TELEPHONE ENCOUNTER
RN called patient regarding MyChart message she sent.  Advised patient that after speaking with provider, wound is healing appropriately.  Reminded patient of appt on 10/15.  Patient appreciative of return call.

## 2024-10-15 ENCOUNTER — OFFICE VISIT (OUTPATIENT)
Dept: GYNECOLOGIC ONCOLOGY | Facility: CLINIC | Age: 54
End: 2024-10-15
Payer: COMMERCIAL

## 2024-10-15 VITALS
SYSTOLIC BLOOD PRESSURE: 137 MMHG | DIASTOLIC BLOOD PRESSURE: 79 MMHG | OXYGEN SATURATION: 97 % | HEIGHT: 61 IN | RESPIRATION RATE: 17 BRPM | TEMPERATURE: 97.3 F | BODY MASS INDEX: 32.4 KG/M2 | HEART RATE: 74 BPM | WEIGHT: 171.6 LBS

## 2024-10-15 DIAGNOSIS — Z98.890 POST-OPERATIVE STATE: ICD-10-CM

## 2024-10-15 DIAGNOSIS — N90.3 DIFFERENTIATED VULVAR INTRAEPITHELIAL NEOPLASIA (DVIN): Primary | ICD-10-CM

## 2024-10-15 PROCEDURE — 99024 POSTOP FOLLOW-UP VISIT: CPT | Performed by: OBSTETRICS & GYNECOLOGY

## 2024-10-17 PROBLEM — N90.3 DIFFERENTIATED VULVAR INTRAEPITHELIAL NEOPLASIA (DVIN): Status: ACTIVE | Noted: 2024-10-17

## 2024-10-17 NOTE — PROGRESS NOTES
"Edyta Franco  [id number]  1970      Reason for Visit:  Postoperative evaluation    History of Present Illness:  Patient is a very pleasant 54 y.o. woman who presents for a post operative evaluation status post extensive simple partial vulvectomy performed on 9/12/2024.      Pain overall improving.  Has been performing wound care with silver impregnated dressing.  Notes wound is largely resolved.  Does have concerns about anatomic changes related to surgery.  We addressed these concerns.  Has questions about her pathology report.  Has concerns regarding positive margins and risk of recurrence.  We discussed that the margins include clitoral and periurethral positive margins and that this was an extensive resection.  I had concerns about sexual function and bladder function should a more aggressive approach be performed.    Past Medical History, Past Surgical History, Social History, Family History have been reviewed and are without significant changes except as mentioned.    Review of Systems   All other systems were reviewed and are negative except as mentioned above.    Medications:  The current medication list was reviewed in the EMR    ALLERGIES:    Allergies   Allergen Reactions    Penicillins Hives         /79   Pulse 74   Temp 97.3 °F (36.3 °C) (Temporal)   Resp 17   Ht 154.9 cm (60.98\")   Wt 77.8 kg (171 lb 9.6 oz)   LMP  (LMP Unknown)   SpO2 97%   BMI 32.44 kg/m²   ECOG score: 1       Physical Exam  Constitutional:  Patient is a pleasant woman in no acute distress.  Extremities:  Bilateral lower extremities are non-tender.  Gynecologic: Anterior aspect of the incision is intact.  Retained sutures removed.  Minimal residual granulation tissue.    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  All pathology reports were discussed with the patient.  Encounter Diagnoses   Name Primary?    Differentiated vulvar intraepithelial neoplasia (dVIN) Yes    Post-operative state      Wound is well-healed at this point.  Patient can discontinue wound care.  Discussed her diagnosis in detail.  She had concerns regarding positive margins and progression.  She is actually had vulvar cancer in the past so I do not think were concerned about progression, but more concerned about recurrence.  I reassured her regarding her diagnosis not being recurrent invasive vulvar cancer.  She had questions about other modes of treatment such as radiation.  We discussed imiquimod, CO2 laser, and radiation as possibilities for future treatment.  She verbalized understanding.    Follow up: 6 months, patient can call and get in sooner if she has concerns    Isabela Nassar MD  10/17/24  13:00 EDT

## 2024-11-06 LAB
CYTO UR: NORMAL
LAB AP CASE REPORT: NORMAL
LAB AP CLINICAL INFORMATION: NORMAL
LAB AP DIAGNOSIS COMMENT: NORMAL
PATH REPORT.ADDENDUM SPEC: NORMAL
PATH REPORT.FINAL DX SPEC: NORMAL
PATH REPORT.GROSS SPEC: NORMAL

## 2024-12-01 DIAGNOSIS — E78.01 FAMILIAL HYPERCHOLESTEROLEMIA: ICD-10-CM

## 2024-12-03 ENCOUNTER — DOCUMENTATION (OUTPATIENT)
Dept: PHYSICAL THERAPY | Facility: HOSPITAL | Age: 54
End: 2024-12-03
Payer: COMMERCIAL

## 2024-12-03 DIAGNOSIS — S31.40XD OPEN WOUND OF VULVA WITH COMPLICATION, SUBSEQUENT ENCOUNTER: Primary | ICD-10-CM

## 2024-12-03 NOTE — THERAPY DISCHARGE NOTE
Outpatient Rehabilitation - Wound/Debridement Discharge Summary       Patient Name: Edyta Franco  : 1970  MRN: [id number]  Today's Date: 12/3/2024                  Admit Date: (Not on file)    Visit Dx:    ICD-10-CM ICD-9-CM   1. Open wound of vulva with complication, subsequent encounter  S31.40XD V58.89     878.5       Patient Active Problem List   Diagnosis    Hypertension, essential    Dyslipidemia    Myoclonic seizure    Familial hypercholesterolemia    Fibromyalgia    Flu vaccine need    BMI 30.0-30.9,adult    Venous insufficiency of both lower extremities    Palpitations    Epilepsy    Malignant neoplasm of vulva, unspecified    Lichen sclerosus et atrophicus    Vulvar pruritus    Tarsal tunnel syndrome of right side    Infection due to Streptococcus agalactiae    Joint disorder of hand    Leg pain, left    Neutrophilic leukemoid reaction    Other obesity due to excess calories    Pain due to vascular prosthetic devices, implants and grafts, initial encounter    Thrombocytopenia, secondary    Thumb pain    Pain of finger    Pain in right hip    Arthralgia of right ankle    Cellulitis of groin    Neck muscle spasm    Abnormal screening cardiac CT    Family history of cardiac disorder    ARGENTINA III (vulvar intraepithelial neoplasia III)    Vulvar pain    Post-operative state    Differentiated vulvar intraepithelial neoplasia (dVIN)        Past Medical History:   Diagnosis Date    Epilepsy 2020    Patient reported she was taken off her medication for this apx 10 years ago.  Reported no seizure activity for apx. 8 years ago.      Fatigue     Hypercholesteremia     Hypertension     Lichen sclerosus of female genitalia     Rectal bleeding 2020    Spitz nevus     EXCISION ON 24    Varicella 1974    Vulva cancer     Vulvar cancer 2017    Wears contact lenses 2020    Patient advised contacts will need to be removed DOS        Past Surgical History:   Procedure Laterality Date      SECTION      COLONOSCOPY N/A 2020    Procedure: COLONOSCOPY WITH BIOPSY;  Surgeon: Eleni Russell MD;  Location:  [facility];  Service: Gastroenterology;  Laterality: N/A;    HAND SURGERY Right 2014    LUMBAR LAMINECTOMY  2011    HEMILAMI RT-C7-T1, RT C7-T1 MEDIAL FACET & CT-T1 DISC (Dr. Cabrera    LYMPH NODE BIOPSY      ORIF ANKLE FRACTURE Right     TOTAL ABDOMINAL HYSTERECTOMY WITH SALPINGO OOPHORECTOMY      LANDON Meneses     VULVA BIOPSY      VULVA SURGERY  2024    biopsy  for Lichens    VULVECTOMY N/A 2017    Procedure: MODIFIED RADICAL VULVECTOMY, BILATERAL SENTINAL LYMPH NODE DISSECTION, MAPPING BIOPSIES OF VULVA;  Surgeon: Isabela Nassar MD;  Location:  [facility];  Service:     VULVECTOMY N/A 2024    Procedure: SIMPLE PARTIAL VULVECTOMY;  Surgeon: Isabela Nassar MD;  Location:  [facility];  Service: Gynecology Oncology;  Laterality: N/A;       Goals   PT OP Goals       Row Name 24 1000          PT Short Term Goals    STG 1 Verbalize s/sx of infection and when to seek medical attention.  -     STG 1 Progress Met  -     STG 2 Decrease depth of wound by 1cm as evidence of wound healing.  -     STG 2 Progress Met  -     STG 3 Demonstrate <5/10 pain during dressing change to improve QOL.  -     STG 3 Progress Met  -        Long Term Goals    LTG 1 Demonstrate independence with home dressing management to promote return to PLOF.  -     LTG 1 Progress Met  -     LTG 2 Decrease depth of wound by 2cm as evidence of wound healing.  -     LTG 2 Progress Met  -     LTG 3 Demonstrate <2/10 pain during dressing change to improve QOL.  -     LTG 3 Progress Met  -               User Key  (r) = Recorded By, (t) = Taken By, (c) = Cosigned By      Initials Name Provider Type    Barbie Kunz, PT Physical Therapist                       OP Discharge Summary       Row Name 24 1029             OP PT Discharge  Summary    Date of Discharge 10/01/24  -      Reason for Discharge All goals achieved;Independent  Patient canceled future appts due to wound healing.  -      Outcomes Achieved Able to achieve all goals within established timeline  -      Discharge Destination Home without follow-up  -                User Key  (r) = Recorded By, (t) = Taken By, (c) = Cosigned By      Initials Name Provider Type    Barbie Kunz, PT Physical Therapist                    Barbie Mcallister, PT  12/3/2024

## 2024-12-04 RX ORDER — FENOFIBRATE 160 MG/1
160 TABLET ORAL DAILY
Qty: 90 TABLET | Refills: 3 | Status: SHIPPED | OUTPATIENT
Start: 2024-12-04

## 2024-12-28 DIAGNOSIS — I10 HYPERTENSION, ESSENTIAL: ICD-10-CM

## 2024-12-30 RX ORDER — HYDROCHLOROTHIAZIDE 12.5 MG/1
TABLET ORAL
Qty: 90 TABLET | Refills: 0 | Status: SHIPPED | OUTPATIENT
Start: 2024-12-30

## 2024-12-30 RX ORDER — LISINOPRIL 20 MG/1
TABLET ORAL
Qty: 90 TABLET | Refills: 0 | Status: SHIPPED | OUTPATIENT
Start: 2024-12-30

## 2024-12-30 NOTE — TELEPHONE ENCOUNTER
Rx Refill Note  Requested Prescriptions     Pending Prescriptions Disp Refills    hydroCHLOROthiazide 12.5 MG tablet [Pharmacy Med Name: HYDROCHLOROTHIAZIDE 12.5MG TAB] 90 tablet 0     Sig: Take 1 tablet by mouth once daily    lisinopril (PRINIVIL,ZESTRIL) 20 MG tablet [Pharmacy Med Name: Lisinopril 20 MG Oral Tablet] 90 tablet 0     Sig: Take 1 tablet by mouth once daily      Last office visit with prescribing clinician: 8/6/2024   Last telemedicine visit with prescribing clinician: Visit date not found   Next office visit with prescribing clinician: Visit date not found                         Would you like a call back once the refill request has been completed: [] Yes [] No    If the office needs to give you a call back, can they leave a voicemail: [] Yes [] No    Yola Cunha MA  12/30/24, 09:33 EST

## 2025-03-04 DIAGNOSIS — I87.2 VENOUS INSUFFICIENCY OF BOTH LOWER EXTREMITIES: ICD-10-CM

## 2025-03-04 DIAGNOSIS — I10 HYPERTENSION, ESSENTIAL, BENIGN: ICD-10-CM

## 2025-03-05 RX ORDER — TORSEMIDE 10 MG/1
TABLET ORAL
Qty: 90 TABLET | Refills: 2 | Status: SHIPPED | OUTPATIENT
Start: 2025-03-05

## 2025-03-05 NOTE — TELEPHONE ENCOUNTER
Rx Refill Note  Requested Prescriptions     Pending Prescriptions Disp Refills    torsemide (DEMADEX) 10 MG tablet [Pharmacy Med Name: Torsemide 10 MG Oral Tablet] 90 tablet 0     Sig: Take 1 tablet by mouth once daily      Last office visit with prescribing clinician: 8/6/2024   Last telemedicine visit with prescribing clinician: Visit date not found   Next office visit with prescribing clinician: Visit date not found                         Would you like a call back once the refill request has been completed: [] Yes [] No    If the office needs to give you a call back, can they leave a voicemail: [] Yes [] No    Cary Miranda MA  03/05/25, 08:48 EST

## 2025-03-21 ENCOUNTER — OFFICE VISIT (OUTPATIENT)
Age: 55
End: 2025-03-21
Payer: COMMERCIAL

## 2025-03-21 VITALS
WEIGHT: 166 LBS | SYSTOLIC BLOOD PRESSURE: 108 MMHG | DIASTOLIC BLOOD PRESSURE: 70 MMHG | HEART RATE: 80 BPM | HEIGHT: 61 IN | BODY MASS INDEX: 31.34 KG/M2 | OXYGEN SATURATION: 97 %

## 2025-03-21 DIAGNOSIS — E66.811 CLASS 1 OBESITY DUE TO EXCESS CALORIES WITH SERIOUS COMORBIDITY AND BODY MASS INDEX (BMI) OF 31.0 TO 31.9 IN ADULT: ICD-10-CM

## 2025-03-21 DIAGNOSIS — M25.50 POLYARTHRALGIA: ICD-10-CM

## 2025-03-21 DIAGNOSIS — R73.01 IMPAIRED FASTING GLUCOSE: ICD-10-CM

## 2025-03-21 DIAGNOSIS — I10 HYPERTENSION, ESSENTIAL: ICD-10-CM

## 2025-03-21 DIAGNOSIS — I87.2 VENOUS INSUFFICIENCY OF BOTH LOWER EXTREMITIES: Primary | ICD-10-CM

## 2025-03-21 DIAGNOSIS — M79.10 MYALGIA: ICD-10-CM

## 2025-03-21 DIAGNOSIS — E66.09 CLASS 1 OBESITY DUE TO EXCESS CALORIES WITH SERIOUS COMORBIDITY AND BODY MASS INDEX (BMI) OF 31.0 TO 31.9 IN ADULT: ICD-10-CM

## 2025-03-21 DIAGNOSIS — E78.5 DYSLIPIDEMIA: ICD-10-CM

## 2025-03-21 LAB — HBA1C MFR BLD: 5.5 % (ref 4.8–5.6)

## 2025-03-21 PROCEDURE — 86140 C-REACTIVE PROTEIN: CPT | Performed by: FAMILY MEDICINE

## 2025-03-21 PROCEDURE — 86800 THYROGLOBULIN ANTIBODY: CPT | Performed by: FAMILY MEDICINE

## 2025-03-21 PROCEDURE — 84550 ASSAY OF BLOOD/URIC ACID: CPT | Performed by: FAMILY MEDICINE

## 2025-03-21 PROCEDURE — 84443 ASSAY THYROID STIM HORMONE: CPT | Performed by: FAMILY MEDICINE

## 2025-03-21 PROCEDURE — 83036 HEMOGLOBIN GLYCOSYLATED A1C: CPT | Performed by: FAMILY MEDICINE

## 2025-03-21 PROCEDURE — 83520 IMMUNOASSAY QUANT NOS NONAB: CPT | Performed by: FAMILY MEDICINE

## 2025-03-21 PROCEDURE — 84439 ASSAY OF FREE THYROXINE: CPT | Performed by: FAMILY MEDICINE

## 2025-03-21 PROCEDURE — 86038 ANTINUCLEAR ANTIBODIES: CPT | Performed by: FAMILY MEDICINE

## 2025-03-21 PROCEDURE — 86063 ANTISTREPTOLYSIN O SCREEN: CPT | Performed by: FAMILY MEDICINE

## 2025-03-21 PROCEDURE — 86431 RHEUMATOID FACTOR QUANT: CPT | Performed by: FAMILY MEDICINE

## 2025-03-21 PROCEDURE — 86376 MICROSOMAL ANTIBODY EACH: CPT | Performed by: FAMILY MEDICINE

## 2025-03-21 PROCEDURE — 86060 ANTISTREPTOLYSIN O TITER: CPT | Performed by: FAMILY MEDICINE

## 2025-03-21 RX ORDER — ROFLUMILAST 500 UG/1
TABLET ORAL
COMMUNITY

## 2025-03-21 RX ORDER — HALOBETASOL PROPIONATE 0.05 %
OINTMENT (GRAM) TOPICAL
COMMUNITY
Start: 2025-03-12

## 2025-03-22 LAB
ASO AB SERPL-ACNC: POSITIVE [IU]/ML
CHROMATIN AB SERPL-ACNC: <10 IU/ML (ref 0–14)
CRP SERPL-MCNC: <0.3 MG/DL (ref 0–0.5)
T4 FREE SERPL-MCNC: 1.07 NG/DL (ref 0.92–1.68)
THYROPEROXIDASE AB SERPL-ACNC: 12 IU/ML (ref 0–34)
TSH SERPL DL<=0.05 MIU/L-ACNC: 1.33 UIU/ML (ref 0.27–4.2)
URATE SERPL-MCNC: 6.2 MG/DL (ref 2.4–5.7)

## 2025-03-23 LAB — TSH RECEP AB SER-ACNC: <1.1 IU/L (ref 0–1.75)

## 2025-03-24 LAB
ANA SER QL: NEGATIVE
THYROGLOB AB SERPL-ACNC: <1 IU/ML (ref 0–0.9)

## 2025-03-25 LAB — ASO AB SERPL-ACNC: 170.1 IU/ML (ref 0–200)

## 2025-04-05 NOTE — PROGRESS NOTES
Established Patient        Chief Complaint:   Chief Complaint   Patient presents with    Med Management        Edyta Franco is a 54 y.o. female    History of Present Illness:   Answers submitted by the patient for this visit:  Weight Management (Submitted on 3/20/2025)  Chief Complaint: Weight Management  Weight: increased  Weight change (lbs): 20  Weight loss treatment: portion control  Eating habit changes: No changes  Energy level: decreased  Physical activity tolerance: worse  Treatment barriers: none  Additional information: Surgery and cancer maangement have made it difficult to maintain healthy weight    Here today for evaluation of venous insufficiency of lower extremities, hypertension and dyslipidemia.    Patient also complains of longstanding difficulties with weight loss efforts, despite dietary/lifestyle/exercise modifications, her weight loss efforts have yielded no sustainable benefit/results.    She reports diffuse myalgias, unexplainable etiology.  Diffuse arthralgias of bilateral upper and lower extremities additionally.  Known history of impaired fasting glucose.    Continues to be followed by gynecology/oncology for urogenital cancerous issue.         Subjective     The following portions of the patient's history were reviewed and updated as appropriate: allergies, current medications, past family history, past medical history, past social history, past surgical history and problem list.    Allergies   Allergen Reactions    Penicillins Hives       Review of Systems  Constitutional: Negative for fever. Negative for chills, diaphoresis; mild malaise/fatigue, chronic difficulties with weight loss efforts.  HENT: No dysphagia; no changes to vision/hearing/smell/taste; no epistaxis.  Heavy nasal congestion.  Eyes: Negative for redness and visual disturbance.   Respiratory: Cough as per above.  Cardiovascular: Negative for chest pain and palpitations.   Gastrointestinal:  "Negative for abdominal distention, abdominal pain and blood in stool.   Endocrine: Negative for cold intolerance and heat intolerance.   Genitourinary: Negative for difficulty urinating, dysuria and frequency.   Musculoskeletal: Chronic diffuse upper extremity and lower extremity arthralgias, back pain and myalgias.  Chronic gravity-dependent edema of lower extremities.  Skin: Negative for new rash or wound.   Neurological: Negative for syncope, weakness and headaches.   Hematological: Negative for adenopathy. Does not bruise/bleed easily.   Psychiatric/Behavioral: Negative for confusion. The patient is not nervous/anxious.    Objective     Physical Exam   Vital Signs: /70   Pulse 80   Ht 154.9 cm (61\")   Wt 75.3 kg (166 lb)   LMP  (LMP Unknown)   SpO2 97%   BMI 31.37 kg/m²       Patient's (Body mass index is 31.37 kg/m².) indicates that they are overweight (BMI 25-29.9) with health related conditions that include dyslipidemias . Weight is unchanged. BMI is is above average; BMI management plan is completed. We discussed portion control and increasing exercise.      General Appearance: alert, oriented x 3, no acute distress.  Mildly ill-appearing, however remains pleasant and interactive during questioning/examination.  Skin: warm and dry.   HEENT: Atraumatic.  pupils round and reactive to light and accommodation, oral mucosa pink and moist.  Nares patent without epistaxis.  External auditory canals are patent tympanic membranes intact.  Heavy nasal congestion, boggy/inflamed nasal turbinates; moderate postnasal drainage with diffuse erythema to the posterior pharynx.  Neck: supple, no JVD, trachea midline.  No thyromegaly  Lungs: Rhonchus, referred, upper airway congestion that is partially cleared with cough, unlabored breathing effort.  Audible air exchange noted all lung fields.  Heart: RRR, normal S1 and S2, no S3, no rub.  Abdomen: soft, non-tender, no palpable bladder, present bowel sounds to " auscultation ×4.  No guarding or rigidity.  Extremities: no clubbing, cyanosis.  Good range of motion actively and passively.  Symmetric muscle strength and development.  Nonpitting bilateral lower extremity edema, gravity-dependent, that extends to the proximal third of the tibias.  Neuro: normal speech and mental status.  Cranial nerves II through XII intact.  No anosmia. DTR 2+; proprioception intact.  No focal motor/sensory deficits.        Assessment and Plan      Assessment/Plan:   Diagnoses and all orders for this visit:    1. Venous insufficiency of both lower extremities (Primary)  -     Tirzepatide-Weight Management (ZEPBOUND) 2.5 MG/0.5ML solution auto-injector; Inject 0.5 mL under the skin into the appropriate area as directed 1 (One) Time Per Week.  Dispense: 6 mL; Refill: 2  -     TSH  -     T4, Free  -     Uric acid  -     Rheumatoid Factor  -     C-reactive protein  -     SHIRA  -     Antistreptolysin O screen  -     Thyroid Peroxidase Antibody  -     Anti-Thyroglobulin Antibody  -     Thyrotropin Receptor Antibody  -     Antistreptolysin O Titer    2. Hypertension, essential  -     Tirzepatide-Weight Management (ZEPBOUND) 2.5 MG/0.5ML solution auto-injector; Inject 0.5 mL under the skin into the appropriate area as directed 1 (One) Time Per Week.  Dispense: 6 mL; Refill: 2  -     TSH  -     T4, Free  -     Uric acid  -     Rheumatoid Factor  -     C-reactive protein  -     SHIRA  -     Antistreptolysin O screen  -     Thyroid Peroxidase Antibody  -     Anti-Thyroglobulin Antibody  -     Thyrotropin Receptor Antibody  -     Antistreptolysin O Titer    3. Dyslipidemia  -     Tirzepatide-Weight Management (ZEPBOUND) 2.5 MG/0.5ML solution auto-injector; Inject 0.5 mL under the skin into the appropriate area as directed 1 (One) Time Per Week.  Dispense: 6 mL; Refill: 2  -     TSH  -     T4, Free  -     Uric acid  -     Rheumatoid Factor  -     C-reactive protein  -     SHIRA  -     Antistreptolysin O screen  -      Thyroid Peroxidase Antibody  -     Anti-Thyroglobulin Antibody  -     Thyrotropin Receptor Antibody  -     Antistreptolysin O Titer    4. Class 1 obesity due to excess calories with serious comorbidity and body mass index (BMI) of 31.0 to 31.9 in adult  -     Tirzepatide-Weight Management (ZEPBOUND) 2.5 MG/0.5ML solution auto-injector; Inject 0.5 mL under the skin into the appropriate area as directed 1 (One) Time Per Week.  Dispense: 6 mL; Refill: 2  -     TSH  -     T4, Free  -     Uric acid  -     Rheumatoid Factor  -     C-reactive protein  -     SHIRA  -     Antistreptolysin O screen  -     Thyroid Peroxidase Antibody  -     Anti-Thyroglobulin Antibody  -     Thyrotropin Receptor Antibody  -     Antistreptolysin O Titer    5. Impaired fasting glucose  -     Hemoglobin A1c    6. Polyarthralgia  -     TSH  -     T4, Free  -     Uric acid  -     Rheumatoid Factor  -     C-reactive protein  -     SHIRA  -     Antistreptolysin O screen  -     Thyroid Peroxidase Antibody  -     Anti-Thyroglobulin Antibody  -     Thyrotropin Receptor Antibody  -     Antistreptolysin O Titer    7. Myalgia  -     TSH  -     T4, Free  -     Uric acid  -     Rheumatoid Factor  -     C-reactive protein  -     SHIRA  -     Antistreptolysin O screen  -     Thyroid Peroxidase Antibody  -     Anti-Thyroglobulin Antibody  -     Thyrotropin Receptor Antibody  -     Antistreptolysin O Titer    I have discussed GLP-1 medications with patient in detail today, including risk versus benefits of medications, including side effects.  Patient verbalized understanding of the medication and would like to begin use at this time.  She understands that she will utilize the medication in addition to continue dietary/exercise/lifestyle modifications.  I have asked that she notify the office should she develop any ill effects to the new medication.    Due to her extensive myalgias, polyarthralgias, a thorough metabolic evaluation will be ordered today.  Due to  past history of impaired fasting glucose, I have also ordered a hemoglobin A1c to evaluate for new-onset diabetes.    Vital signs demonstrate hemodynamic stability, blood pressure is at goal.    Discussion Summary:    Discussed plan of care in detail with pt today; pt verb understanding and agrees.    I spent 35 minutes caring for Edyta on this date of service. This time includes time spent by me in the following activities:preparing for the visit, performing a medically appropriate examination and/or evaluation , counseling and educating the patient/family/caregiver, ordering medications, tests, or procedures, documenting information in the medical record, and care coordination    I confirm accuracy of unchanged data/findings which have been carried forward from previous visit, as well as I have updated appropriately those that have changed.      Follow up:  No follow-ups on file.     There are no Patient Instructions on file for this visit.    Bill Kimble DO  04/04/25  21:58 EDT

## 2025-04-06 DIAGNOSIS — I10 HYPERTENSION, ESSENTIAL: ICD-10-CM

## 2025-04-07 RX ORDER — HYDROCHLOROTHIAZIDE 12.5 MG/1
12.5 TABLET ORAL DAILY
Qty: 90 TABLET | Refills: 0 | Status: SHIPPED | OUTPATIENT
Start: 2025-04-07

## 2025-04-07 RX ORDER — LISINOPRIL 20 MG/1
20 TABLET ORAL DAILY
Qty: 90 TABLET | Refills: 0 | Status: SHIPPED | OUTPATIENT
Start: 2025-04-07

## 2025-04-07 NOTE — TELEPHONE ENCOUNTER
Rx Refill Note  Requested Prescriptions     Pending Prescriptions Disp Refills    hydroCHLOROthiazide 12.5 MG tablet [Pharmacy Med Name: HYDROCHLOROTHIAZIDE 12.5MG TAB] 90 tablet 0     Sig: Take 1 tablet by mouth once daily    lisinopril (PRINIVIL,ZESTRIL) 20 MG tablet [Pharmacy Med Name: Lisinopril 20 MG Oral Tablet] 90 tablet 0     Sig: Take 1 tablet by mouth once daily      Last office visit with prescribing clinician: 3/21/2025   Last telemedicine visit with prescribing clinician: Visit date not found   Next office visit with prescribing clinician: 9/22/2025                         Would you like a call back once the refill request has been completed: [] Yes [] No    If the office needs to give you a call back, can they leave a voicemail: [] Yes [] No    Yola Cunha MA  04/07/25, 11:15 EDT

## 2025-04-12 DIAGNOSIS — E78.01 FAMILIAL HYPERCHOLESTEROLEMIA: ICD-10-CM

## 2025-04-14 PROBLEM — Z85.44 HISTORY OF CANCER OF VULVA: Status: ACTIVE | Noted: 2025-04-14

## 2025-04-14 RX ORDER — PRAVASTATIN SODIUM 20 MG
20 TABLET ORAL DAILY
Qty: 90 TABLET | Refills: 0 | Status: SHIPPED | OUTPATIENT
Start: 2025-04-14

## 2025-04-14 NOTE — TELEPHONE ENCOUNTER
Rx Refill Note  Requested Prescriptions     Pending Prescriptions Disp Refills    pravastatin (PRAVACHOL) 20 MG tablet [Pharmacy Med Name: Pravastatin Sodium 20 MG Oral Tablet] 90 tablet 0     Sig: Take 1 tablet by mouth once daily      Last office visit with prescribing clinician: 3/21/2025   Last telemedicine visit with prescribing clinician: Visit date not found   Next office visit with prescribing clinician: 9/22/2025                         Would you like a call back once the refill request has been completed: [] Yes [] No    If the office needs to give you a call back, can they leave a voicemail: [] Yes [] No    Yola Cunha MA  04/14/25, 09:01 EDT

## 2025-04-15 ENCOUNTER — OFFICE VISIT (OUTPATIENT)
Dept: GYNECOLOGIC ONCOLOGY | Facility: CLINIC | Age: 55
End: 2025-04-15
Payer: COMMERCIAL

## 2025-04-15 ENCOUNTER — TELEPHONE (OUTPATIENT)
Age: 55
End: 2025-04-15
Payer: COMMERCIAL

## 2025-04-15 VITALS
TEMPERATURE: 97.3 F | OXYGEN SATURATION: 99 % | HEART RATE: 61 BPM | SYSTOLIC BLOOD PRESSURE: 138 MMHG | DIASTOLIC BLOOD PRESSURE: 83 MMHG | BODY MASS INDEX: 31.64 KG/M2 | HEIGHT: 61 IN | WEIGHT: 167.6 LBS | RESPIRATION RATE: 17 BRPM

## 2025-04-15 DIAGNOSIS — E66.09 CLASS 1 OBESITY DUE TO EXCESS CALORIES WITH SERIOUS COMORBIDITY AND BODY MASS INDEX (BMI) OF 31.0 TO 31.9 IN ADULT: Chronic | ICD-10-CM

## 2025-04-15 DIAGNOSIS — L90.0 LICHEN SCLEROSUS ET ATROPHICUS: Primary | ICD-10-CM

## 2025-04-15 DIAGNOSIS — E78.5 DYSLIPIDEMIA: Chronic | ICD-10-CM

## 2025-04-15 DIAGNOSIS — E66.811 CLASS 1 OBESITY DUE TO EXCESS CALORIES WITH SERIOUS COMORBIDITY AND BODY MASS INDEX (BMI) OF 31.0 TO 31.9 IN ADULT: Chronic | ICD-10-CM

## 2025-04-15 DIAGNOSIS — I87.2 VENOUS INSUFFICIENCY OF BOTH LOWER EXTREMITIES: ICD-10-CM

## 2025-04-15 DIAGNOSIS — I10 HYPERTENSION, ESSENTIAL: ICD-10-CM

## 2025-04-15 DIAGNOSIS — Z85.44 HISTORY OF CANCER OF VULVA: ICD-10-CM

## 2025-04-15 PROCEDURE — 99213 OFFICE O/P EST LOW 20 MIN: CPT | Performed by: OBSTETRICS & GYNECOLOGY

## 2025-04-15 NOTE — TELEPHONE ENCOUNTER
Caller: Edyta Franco    Relationship: Self    Best call back number:  [phone]    Which medication are you concerned about:     Tirzepatide-Weight Management (ZEPBOUND) 2.5 MG/0.5ML solution auto-injector     What are your concerns:     MEDICATION SENT TO [facility] ON 3/21 SHOULD HAVE BEEN SENT TO [facility] IN [place]     HER INSURANCE WOULD HAVE SENT OVER AN AUTHORIZATION TODAY

## 2025-04-15 NOTE — PROGRESS NOTES
Edyta Franco  [id number]  1970      Reason for visit:  History of vulvar cancer, lichen sclerosus     History of present illness:  The patient is a 54 y.o. year old female who presents today for treatment and evaluation of the above issues.    Patient has been following closely with Dr. Ruffin.  Notes some irritation, not as bad as it use to be but still there.  Daughter getting .  Recent biopsy RT vulva.  No cancer per her report.     Oncologic History:  Oncology/Hematology History   Malignant neoplasm of vulva, unspecified   8/14/2017 Biopsy    History of lichens sclerosus, on clobetasol therapy. Pt presented to Dr. Abigail Fong with c/o painful ulcerative left vulvar lesion. Biopsy revealed invasive SCC, extending into deep margin at least 3.5 mm. Referred to Gyn Oncology     8/30/2017 Surgery    Modified radical vulvectomy, left inguinofemoral sentinel lymph node dissection, right inguinofemoral lymph node dissection, and mapping biopsies of the vulva. Surgery by Dr. Nassar and Dr. Bree Russell.     Pathology revealed SCC, focally keratinizing with 0.3 cm invasion. LVSI negative, lymph nodes negative, mapping biopsies negative. Stage IB grade 2       9/24/2017 - 9/27/2017 Other Event    Inpatient hospital admission for inguinal incision cellulitis, inguinal fluid collection, and SIRS. Treated with IV antibiotics and sent home on oral antibiotics.      10/26/2017 - 10/27/2017 Other Event    2 day inpatient admission for recurrent cellulitis, resolved with antibiotic management           Past Medical History:   Diagnosis Date    Epilepsy 03/20/2020    Patient reported she was taken off her medication for this apx 10 years ago.  Reported no seizure activity for apx. 8 years ago.      Fatigue     Hypercholesteremia     Hypertension     Lichen sclerosus of female genitalia     Rectal bleeding 03/20/2020    Spitz nevus     EXCISION ON 8/19/24    Varicella 1974    Vulva cancer     Vulvar cancer  2017    Wears contact lenses 2020    Patient advised contacts will need to be removed DOS       Past Surgical History:   Procedure Laterality Date     SECTION      COLONOSCOPY N/A 2020    Procedure: COLONOSCOPY WITH BIOPSY;  Surgeon: Eleni Russell MD;  Location: [facility] ENDOSCOPY;  Service: Gastroenterology;  Laterality: N/A;    HAND SURGERY Right 2014    LUMBAR LAMINECTOMY  2011    HEMILAMI RT-C7-T1, RT C7-T1 MEDIAL FACET & CT-T1 DISC (Dr. Cabrera    LYMPH NODE BIOPSY      ORIF ANKLE FRACTURE Right     TOTAL ABDOMINAL HYSTERECTOMY WITH SALPINGO OOPHORECTOMY      Dr. Richard Armendariz - [facility]     VULVA BIOPSY      VULVA SURGERY  2024    biopsy  for Lichens    VULVECTOMY N/A 2017    Procedure: MODIFIED RADICAL VULVECTOMY, BILATERAL SENTINAL LYMPH NODE DISSECTION, MAPPING BIOPSIES OF VULVA;  Surgeon: Isabela Nassar MD;  Location:  [facility] OR;  Service:     VULVECTOMY N/A 2024    Procedure: SIMPLE PARTIAL VULVECTOMY;  Surgeon: Isabela Nassar MD;  Location:  [facility] OR;  Service: Gynecology Oncology;  Laterality: N/A;       MEDICATIONS: The current medication list was reviewed with the patient and updated in the EMR this date per the Medical Assistant. Medication dosages and frequencies were confirmed to be accurate.      Allergies:  is allergic to penicillins.    Social History:   Social History     Socioeconomic History    Marital status:     Number of children: 2   Tobacco Use    Smoking status: Never     Passive exposure: Never    Smokeless tobacco: Never   Vaping Use    Vaping status: Never Used   Substance and Sexual Activity    Alcohol use: Yes     Alcohol/week: 1.0 standard drink of alcohol     Types: 1 Glasses of wine per week     Comment: 2x month    Drug use: Never    Sexual activity: Yes     Partners: Male     Birth control/protection: Surgical       Family History:    Family History   Problem Relation Age of Onset    Diabetes type II Father  "    Congenital heart disease Father     Hypertension Father     Hyperlipidemia Father     Diabetes Father     Heart disease Father     Heart attack Father     Ovarian cancer Mother     Diabetes type II Mother     Congenital heart disease Mother     Hypertension Mother     Stroke Mother     Hyperlipidemia Mother     Arthritis Mother     Heart disease Mother     Heart attack Mother     Heart attack Other     Hyperlipidemia Other     Breast cancer Neg Hx     Colon cancer Neg Hx     Uterine cancer Neg Hx        Health Maintenance:    Health Maintenance   Topic Date Due    HEPATITIS C SCREENING  Never done    Pneumococcal Vaccine 50+ (1 of 1 - PCV) Never done    ZOSTER VACCINE (1 of 2) Never done    ANNUAL PHYSICAL  02/25/2021    LIPID PANEL  12/30/2023    MAMMOGRAM  06/22/2024    COVID-19 Vaccine (3 - 2024-25 season) 09/01/2024    INFLUENZA VACCINE  07/01/2025    TDAP/TD VACCINES (2 - Td or Tdap) 03/01/2030    COLORECTAL CANCER SCREENING  03/23/2030     Review of Systems  Please refer to HPI, remainder of ROS negative.    Physical Exam  Vitals:    04/15/25 1134   BP: 138/83   Pulse: 61   Resp: 17   Temp: 97.3 °F (36.3 °C)   TempSrc: Temporal   SpO2: 99%   Weight: 76 kg (167 lb 9.6 oz)   Height: 154.9 cm (60.98\")   PainSc: 0-No pain         Body mass index is 31.68 kg/m².  Wt Readings from Last 3 Encounters:   04/15/25 76 kg (167 lb 9.6 oz)   03/21/25 75.3 kg (166 lb)   10/15/24 77.8 kg (171 lb 9.6 oz)     GENERAL: Alert, well-appearing female appearing her stated age who is in no apparent distress.    HEENT: Sclera anicteric. Head normocephalic, atraumatic.  CARDIOVASCULAR:  no peripheral edema.  RESPIRATORY:normal respiratory effort  SKIN:  Warm, dry, well-perfused.  All visible areas intact.  No rashes, lesions, ulcers.  PSYCHIATRIC: AO x3, with appropriate affect, normal thought processes.  NEUROLOGIC: No focal deficits.  Moves extremities well.  MUSCULOSKELETAL: Normal gait and station.   EXTREMITIES:   No cyanosis, " "clubbing, symmetric.     PELVIC exam:  external genitalia with diffuse lichen sclerosis, more thickened epithelium at introitus     ECOG PS 0    PROCEDURES: None    Diagnostic Data:      Lab Results   Component Value Date    WBC 7.93 09/13/2024    HGB 10.9 (L) 09/13/2024    HCT 32.9 (L) 09/13/2024    MCV 94.5 09/13/2024     09/13/2024    NEUTROABS 6.40 09/13/2024    GLUCOSE 144 (H) 09/13/2024    BUN 11 09/13/2024    CREATININE 0.91 09/13/2024    EGFRIFNONA 77 10/18/2021    EGFRIFAFRI 84 10/28/2020     09/13/2024    K 4.1 09/13/2024     09/13/2024    CO2 25.0 09/13/2024    MG 2.1 01/06/2023    CALCIUM 8.7 09/13/2024    ALBUMIN 4.4 09/05/2024    AST 19 09/05/2024    ALT 21 09/05/2024    BILITOT 0.3 09/05/2024     No results found for: \"\"      Assessment & Plan   This is a 54 y.o. woman with history of vulvar cancer, diffuse lichen sclerosis.    Encounter Diagnoses   Name Primary?    Lichen sclerosus et atrophicus Yes    History of cancer of vulva      Patient without evidence of invasive cancer  Ongoing extensive lichen sclerosis, closely followed by derm  She prefer follow up on an as needed basis since she sees Dr. Ruffin on a regular basis and I though that was very reasonable.    Pain assessment was performed today as a part of patient’s care.  For patients with pain related to surgery, gynecologic malignancy or cancer treatment, the plan is as noted in the assessment/plan.  For patients with pain not related to these issues, they are to seek any further needed care from a more appropriate provider, such as PCP.      No orders of the defined types were placed in this encounter.    FOLLOW UP: surgery    I spent 22 minutes caring for Edyta on this date of service. This time includes time spent by me in the following activities: preparing for the visit, reviewing tests, performing a medically appropriate examination and/or evaluation, counseling and educating the patient/family/caregiver, " referring and communicating with other health care professionals, documenting information in the medical record, and care coordination    Isabela Nassar MD  04/16/25  16:01 EDT

## 2025-05-05 ENCOUNTER — TRANSCRIBE ORDERS (OUTPATIENT)
Dept: ADMINISTRATIVE | Facility: HOSPITAL | Age: 55
End: 2025-05-05
Payer: COMMERCIAL

## 2025-05-05 DIAGNOSIS — Z12.31 ENCOUNTER FOR SCREENING MAMMOGRAM FOR MALIGNANT NEOPLASM OF BREAST: Primary | ICD-10-CM

## 2025-06-01 ENCOUNTER — APPOINTMENT (OUTPATIENT)
Dept: CT IMAGING | Facility: HOSPITAL | Age: 55
End: 2025-06-01
Payer: COMMERCIAL

## 2025-06-01 ENCOUNTER — HOSPITAL ENCOUNTER (EMERGENCY)
Facility: HOSPITAL | Age: 55
Discharge: HOME | End: 2025-06-01
Attending: STUDENT IN AN ORGANIZED HEALTH CARE EDUCATION/TRAINING PROGRAM | Admitting: STUDENT IN AN ORGANIZED HEALTH CARE EDUCATION/TRAINING PROGRAM
Payer: COMMERCIAL

## 2025-06-01 ENCOUNTER — PATIENT ROUNDING (BHMG ONLY) (OUTPATIENT)
Dept: URGENT CARE | Facility: CLINIC | Age: 55
End: 2025-06-01
Payer: COMMERCIAL

## 2025-06-01 VITALS
WEIGHT: 154 LBS | TEMPERATURE: 98.4 F | OXYGEN SATURATION: 96 % | RESPIRATION RATE: 16 BRPM | HEART RATE: 68 BPM | SYSTOLIC BLOOD PRESSURE: 125 MMHG | BODY MASS INDEX: 29.07 KG/M2 | HEIGHT: 61 IN | DIASTOLIC BLOOD PRESSURE: 76 MMHG

## 2025-06-01 DIAGNOSIS — K20.90 ESOPHAGITIS: ICD-10-CM

## 2025-06-01 DIAGNOSIS — R10.9 FLANK PAIN: Primary | ICD-10-CM

## 2025-06-01 LAB
ALBUMIN SERPL-MCNC: 4.4 G/DL (ref 3.5–5.2)
ALBUMIN/GLOB SERPL: 1.8 G/DL
ALP SERPL-CCNC: 40 U/L (ref 39–117)
ALT SERPL W P-5'-P-CCNC: 28 U/L (ref 1–33)
ANION GAP SERPL CALCULATED.3IONS-SCNC: 13 MMOL/L (ref 5–15)
AST SERPL-CCNC: 22 U/L (ref 1–32)
BACTERIA UR QL AUTO: ABNORMAL /HPF
BASOPHILS # BLD AUTO: 0.05 10*3/MM3 (ref 0–0.2)
BASOPHILS NFR BLD AUTO: 0.8 % (ref 0–1.5)
BILIRUB SERPL-MCNC: 0.3 MG/DL (ref 0–1.2)
BILIRUB UR QL STRIP: NEGATIVE
BUN SERPL-MCNC: 10 MG/DL (ref 6–20)
BUN/CREAT SERPL: 8.8 (ref 7–25)
CALCIUM SPEC-SCNC: 9.3 MG/DL (ref 8.6–10.5)
CHLORIDE SERPL-SCNC: 102 MMOL/L (ref 98–107)
CLARITY UR: CLEAR
CO2 SERPL-SCNC: 23 MMOL/L (ref 22–29)
COLOR UR: YELLOW
CREAT SERPL-MCNC: 1.13 MG/DL (ref 0.57–1)
CRP SERPL-MCNC: 0.33 MG/DL (ref 0–0.5)
D-LACTATE SERPL-SCNC: 0.7 MMOL/L (ref 0.5–2)
DEPRECATED RDW RBC AUTO: 37.2 FL (ref 37–54)
EGFRCR SERPLBLD CKD-EPI 2021: 57.9 ML/MIN/1.73
EOSINOPHIL # BLD AUTO: 0.1 10*3/MM3 (ref 0–0.4)
EOSINOPHIL NFR BLD AUTO: 1.6 % (ref 0.3–6.2)
ERYTHROCYTE [DISTWIDTH] IN BLOOD BY AUTOMATED COUNT: 11.6 % (ref 12.3–15.4)
FLUAV RNA RESP QL NAA+PROBE: NOT DETECTED
FLUBV RNA RESP QL NAA+PROBE: NOT DETECTED
GLOBULIN UR ELPH-MCNC: 2.4 GM/DL
GLUCOSE SERPL-MCNC: 89 MG/DL (ref 65–99)
GLUCOSE UR STRIP-MCNC: NEGATIVE MG/DL
HCT VFR BLD AUTO: 40 % (ref 34–46.6)
HGB BLD-MCNC: 13.6 G/DL (ref 12–15.9)
HGB UR QL STRIP.AUTO: ABNORMAL
HYALINE CASTS UR QL AUTO: ABNORMAL /LPF
IMM GRANULOCYTES # BLD AUTO: 0.01 10*3/MM3 (ref 0–0.05)
IMM GRANULOCYTES NFR BLD AUTO: 0.2 % (ref 0–0.5)
KETONES UR QL STRIP: ABNORMAL
LEUKOCYTE ESTERASE UR QL STRIP.AUTO: ABNORMAL
LIPASE SERPL-CCNC: 26 U/L (ref 13–60)
LYMPHOCYTES # BLD AUTO: 1.55 10*3/MM3 (ref 0.7–3.1)
LYMPHOCYTES NFR BLD AUTO: 24.7 % (ref 19.6–45.3)
MAGNESIUM SERPL-MCNC: 2.2 MG/DL (ref 1.6–2.6)
MCH RBC QN AUTO: 30.1 PG (ref 26.6–33)
MCHC RBC AUTO-ENTMCNC: 34 G/DL (ref 31.5–35.7)
MCV RBC AUTO: 88.5 FL (ref 79–97)
MONOCYTES # BLD AUTO: 0.46 10*3/MM3 (ref 0.1–0.9)
MONOCYTES NFR BLD AUTO: 7.3 % (ref 5–12)
MUCOUS THREADS URNS QL MICRO: ABNORMAL /HPF
NEUTROPHILS NFR BLD AUTO: 4.11 10*3/MM3 (ref 1.7–7)
NEUTROPHILS NFR BLD AUTO: 65.4 % (ref 42.7–76)
NITRITE UR QL STRIP: NEGATIVE
NRBC BLD AUTO-RTO: 0 /100 WBC (ref 0–0.2)
PH UR STRIP.AUTO: 8.5 [PH] (ref 5–8)
PLATELET # BLD AUTO: 249 10*3/MM3 (ref 140–450)
PMV BLD AUTO: 10.1 FL (ref 6–12)
POTASSIUM SERPL-SCNC: 3.3 MMOL/L (ref 3.5–5.2)
PROT SERPL-MCNC: 6.8 G/DL (ref 6–8.5)
PROT UR QL STRIP: ABNORMAL
RBC # BLD AUTO: 4.52 10*6/MM3 (ref 3.77–5.28)
RBC # UR STRIP: ABNORMAL /HPF
REF LAB TEST METHOD: ABNORMAL
SARS-COV-2 RNA RESP QL NAA+PROBE: NOT DETECTED
SODIUM SERPL-SCNC: 138 MMOL/L (ref 136–145)
SP GR UR STRIP: 1.02 (ref 1–1.03)
SQUAMOUS #/AREA URNS HPF: ABNORMAL /HPF
TROPONIN T SERPL HS-MCNC: 9 NG/L
UROBILINOGEN UR QL STRIP: ABNORMAL
WBC # UR STRIP: ABNORMAL /HPF
WBC NRBC COR # BLD AUTO: 6.28 10*3/MM3 (ref 3.4–10.8)

## 2025-06-01 PROCEDURE — 74177 CT ABD & PELVIS W/CONTRAST: CPT

## 2025-06-01 PROCEDURE — 83690 ASSAY OF LIPASE: CPT | Performed by: STUDENT IN AN ORGANIZED HEALTH CARE EDUCATION/TRAINING PROGRAM

## 2025-06-01 PROCEDURE — 85025 COMPLETE CBC W/AUTO DIFF WBC: CPT | Performed by: STUDENT IN AN ORGANIZED HEALTH CARE EDUCATION/TRAINING PROGRAM

## 2025-06-01 PROCEDURE — 86140 C-REACTIVE PROTEIN: CPT | Performed by: STUDENT IN AN ORGANIZED HEALTH CARE EDUCATION/TRAINING PROGRAM

## 2025-06-01 PROCEDURE — 99285 EMERGENCY DEPT VISIT HI MDM: CPT | Performed by: STUDENT IN AN ORGANIZED HEALTH CARE EDUCATION/TRAINING PROGRAM

## 2025-06-01 PROCEDURE — 81001 URINALYSIS AUTO W/SCOPE: CPT | Performed by: STUDENT IN AN ORGANIZED HEALTH CARE EDUCATION/TRAINING PROGRAM

## 2025-06-01 PROCEDURE — 87636 SARSCOV2 & INF A&B AMP PRB: CPT | Performed by: STUDENT IN AN ORGANIZED HEALTH CARE EDUCATION/TRAINING PROGRAM

## 2025-06-01 PROCEDURE — 80053 COMPREHEN METABOLIC PANEL: CPT | Performed by: STUDENT IN AN ORGANIZED HEALTH CARE EDUCATION/TRAINING PROGRAM

## 2025-06-01 PROCEDURE — 84484 ASSAY OF TROPONIN QUANT: CPT | Performed by: STUDENT IN AN ORGANIZED HEALTH CARE EDUCATION/TRAINING PROGRAM

## 2025-06-01 PROCEDURE — 83605 ASSAY OF LACTIC ACID: CPT | Performed by: STUDENT IN AN ORGANIZED HEALTH CARE EDUCATION/TRAINING PROGRAM

## 2025-06-01 PROCEDURE — 25510000001 IOPAMIDOL 61 % SOLUTION: Performed by: STUDENT IN AN ORGANIZED HEALTH CARE EDUCATION/TRAINING PROGRAM

## 2025-06-01 PROCEDURE — 83735 ASSAY OF MAGNESIUM: CPT | Performed by: STUDENT IN AN ORGANIZED HEALTH CARE EDUCATION/TRAINING PROGRAM

## 2025-06-01 RX ORDER — OMEPRAZOLE 40 MG/1
40 CAPSULE, DELAYED RELEASE ORAL EVERY MORNING
Qty: 30 CAPSULE | Refills: 0 | Status: SHIPPED | OUTPATIENT
Start: 2025-06-01

## 2025-06-01 RX ORDER — IOPAMIDOL 612 MG/ML
100 INJECTION, SOLUTION INTRAVASCULAR
Status: COMPLETED | OUTPATIENT
Start: 2025-06-01 | End: 2025-06-01

## 2025-06-01 RX ADMIN — IOPAMIDOL 100 ML: 612 INJECTION, SOLUTION INTRAVENOUS at 17:04

## 2025-06-01 NOTE — DISCHARGE INSTRUCTIONS
You were evaluated for abdominal pain.  We got lab work and a CT scan which showed no concerns for kidney stones or urinary tract infection.  We did see a swelling of your esophagus which is due to esophagitis.  This can sometimes be caused by increased acid from your stomach.  You are now stable for discharge.  We have prescribed you omeprazole to help with your symptoms and have referred you to our gastroenterology group to ensure that your symptoms improve appropriately and evaluate the need for any further evaluation.  If you experience severe increase in abdominal pain or fever, please come back to the the emergency permit for further evaluation.  You are now stable for discharge.

## 2025-06-01 NOTE — ED PROVIDER NOTES
Subjective:  History of Present Illness:    Patient is a 54-year-old female with history of hypertension, hyperlipidemia, vulvar cancer status post resection who presents today with flank pain.  Reports bilateral flank pain over the last 2 to 3 days.  Concern for urinary tract infection and went to an urgent treatment clinic, they found blood in her urine and told her present to our emergency department.  Patient states that she has vaginal bleeding from her wounds from her cancer resection and frequently has open wounds with lichen sclerosus to the area.  She denies any fevers.  No chest pain or shortness of breath.  Denies any vomiting or diarrhea.  Has had nausea.  No new leg swelling or leg pain.      Nurses Notes reviewed and agree, including vitals, allergies, social history and prior medical history.     REVIEW OF SYSTEMS: All systems reviewed and not pertinent unless noted.  Review of Systems   Constitutional:  Positive for activity change. Negative for appetite change, chills, fatigue and fever.   HENT:  Negative for rhinorrhea, sinus pressure and sinus pain.    Eyes:  Negative for discharge and itching.   Respiratory:  Negative for cough and shortness of breath.    Cardiovascular:  Negative for chest pain and leg swelling.   Gastrointestinal:  Negative for abdominal distention, abdominal pain, nausea and vomiting.   Endocrine: Negative for cold intolerance and heat intolerance.   Genitourinary:  Positive for flank pain and vaginal bleeding. Negative for decreased urine volume, difficulty urinating, frequency, urgency, vaginal discharge and vaginal pain.   Musculoskeletal:  Negative for gait problem, neck pain and neck stiffness.   Skin:  Negative for color change.   Allergic/Immunologic: Negative for environmental allergies.   Neurological:  Negative for seizures, syncope, facial asymmetry and speech difficulty.   Psychiatric/Behavioral:  Negative for self-injury and suicidal ideas.        Past Medical  History:   Diagnosis Date    Epilepsy 2020    Patient reported she was taken off her medication for this apx 10 years ago.  Reported no seizure activity for apx. 8 years ago.      Fatigue     Hypercholesteremia     Hypertension     Lichen sclerosus of female genitalia     Rectal bleeding 2020    Spitz nevus     EXCISION ON 24    Varicella 1974    Vulva cancer     Vulvar cancer 2017    Wears contact lenses 2020    Patient advised contacts will need to be removed DOS       Allergies:    Penicillins      Past Surgical History:   Procedure Laterality Date     SECTION      COLONOSCOPY N/A 2020    Procedure: COLONOSCOPY WITH BIOPSY;  Surgeon: Eleni Russell MD;  Location: [facility] END[facility];  Service: Gastroenterology;  Laterality: N/A;    HAND SURGERY Right 2014    LUMBAR LAMINECTOMY  2011    HEMILAMI RT-C7-T1, RT C7-T1 MEDIAL FACET & CT-T1 DISC (Dr. Cabrera    LYMPH NODE BIOPSY      ORIF ANKLE FRACTURE Right     TOTAL ABDOMINAL HYSTERECTOMY WITH SALPINGO OOPHORECTOMY      Dr. Richard Armendariz - [facility]     VULVA BIOPSY      VULVA SURGERY  2024    biopsy  for Lichens    VULVECTOMY N/A 2017    Procedure: MODIFIED RADICAL VULVECTOMY, BILATERAL SENTINAL LYMPH NODE DISSECTION, MAPPING BIOPSIES OF VULVA;  Surgeon: Isabela Nassar MD;  Location:  [facility];  Service:     VULVECTOMY N/A 2024    Procedure: SIMPLE PARTIAL VULVECTOMY;  Surgeon: Isabela Nassar MD;  Location:  [facility];  Service: Gynecology Oncology;  Laterality: N/A;         Social History     Socioeconomic History    Marital status:     Number of children: 2   Tobacco Use    Smoking status: Never     Passive exposure: Never    Smokeless tobacco: Never   Vaping Use    Vaping status: Never Used   Substance and Sexual Activity    Alcohol use: Yes     Alcohol/week: 1.0 standard drink of alcohol     Types: 1 Glasses of wine per week     Comment: 2x month    Drug use: Never    Sexual  "activity: Yes     Partners: Male     Birth control/protection: Surgical         Family History   Problem Relation Age of Onset    Diabetes type II Father     Congenital heart disease Father     Hypertension Father     Hyperlipidemia Father     Diabetes Father     Heart disease Father     Heart attack Father     Ovarian cancer Mother     Diabetes type II Mother     Congenital heart disease Mother     Hypertension Mother     Stroke Mother     Hyperlipidemia Mother     Arthritis Mother     Heart disease Mother     Heart attack Mother     Heart attack Other     Hyperlipidemia Other     Breast cancer Neg Hx     Colon cancer Neg Hx     Uterine cancer Neg Hx        Objective  Physical Exam:  /76   Pulse 68   Temp 98.4 °F (36.9 °C) (Oral)   Resp 16   Ht 154.9 cm (61\")   Wt 69.9 kg (154 lb)   LMP  (LMP Unknown)   SpO2 96%   BMI 29.10 kg/m²      Physical Exam  Constitutional:       General: She is not in acute distress.     Appearance: Normal appearance. She is obese. She is not ill-appearing.   HENT:      Head: Normocephalic and atraumatic.      Nose: Nose normal. No congestion or rhinorrhea.      Mouth/Throat:      Mouth: Mucous membranes are dry.      Pharynx: Oropharynx is clear. No oropharyngeal exudate or posterior oropharyngeal erythema.   Eyes:      Extraocular Movements: Extraocular movements intact.      Conjunctiva/sclera: Conjunctivae normal.      Pupils: Pupils are equal, round, and reactive to light.   Cardiovascular:      Rate and Rhythm: Normal rate and regular rhythm.      Pulses: Normal pulses.      Heart sounds: Normal heart sounds.   Pulmonary:      Effort: Pulmonary effort is normal. No respiratory distress.      Breath sounds: Normal breath sounds.   Abdominal:      General: Abdomen is flat. Bowel sounds are normal. There is no distension.      Palpations: Abdomen is soft.      Tenderness: There is no abdominal tenderness. There is no guarding or rebound.      Comments: Unreactive to " abdominal palpation at the time of my exam   Genitourinary:     Comments: Patient with vulvar lesion in the right lower labia on exam with no obvious surrounding erythema or induration concerning for infection  Musculoskeletal:         General: No swelling or tenderness. Normal range of motion.      Cervical back: Normal range of motion and neck supple.   Skin:     General: Skin is warm and dry.      Capillary Refill: Capillary refill takes less than 2 seconds.   Neurological:      General: No focal deficit present.      Mental Status: She is alert and oriented to person, place, and time. Mental status is at baseline.      Cranial Nerves: No cranial nerve deficit.      Sensory: No sensory deficit.      Motor: No weakness.      Coordination: Coordination normal.   Psychiatric:         Mood and Affect: Mood normal.         Behavior: Behavior normal.         Thought Content: Thought content normal.         Judgment: Judgment normal.         Procedures    ED Course:    ED Course as of 06/01/25 2250   Sun Jun 01, 2025   1601 EKG interpreted by me, normal sinus rhythm no concerning ST changes noted, rate 61 [JE]      ED Course User Index  [JE] Abner Hart MD       Lab Results (last 24 hours)       Procedure Component Value Units Date/Time    POC Urinalysis Dipstick, Multipro (Automated Dipstick) [[id number]]  (Abnormal) Collected: 06/01/25 1454    Specimen: Urine Updated: 06/01/25 1455     Color Yellow     Clarity, UA Cloudy     Glucose, UA Negative mg/dL      Bilirubin Negative     Ketones, UA Negative     Specific Gravity  1.005     Blood, UA 3+     pH, Urine 8.0     Protein, POC Negative mg/dL      Urobilinogen, UA 0.2 E.U./dL     Nitrite, UA Negative     Leukocytes Negative    Urine Culture - Urine, Urine, Clean Catch [[id number]] Collected: 06/01/25 1519    Specimen: Urine, Clean Catch Updated: 06/01/25 1527    COVID-19 and FLU A/B PCR, 1 HR TAT - Swab, Nasopharynx [[id number]]  (Normal) Collected: 06/01/25  1621    Specimen: Swab from Nasopharynx Updated: 06/01/25 1648     COVID19 Not Detected     Influenza A PCR Not Detected     Influenza B PCR Not Detected    Narrative:      Fact sheet for providers: https://www.fda.gov/media/591612/download    Fact sheet for patients: https://www.fda.gov/media/245350/download    Test performed by PCR.    Urinalysis With Culture If Indicated - Urine, Clean Catch [[id number]]  (Abnormal) Collected: 06/01/25 1621    Specimen: Urine, Clean Catch Updated: 06/01/25 1633     Color, UA Yellow     Appearance, UA Clear     pH, UA 8.5     Specific Gravity, UA 1.021     Glucose, UA Negative     Ketones, UA Trace     Bilirubin, UA Negative     Blood, UA Trace     Protein, UA 30 mg/dL (1+)     Leuk Esterase, UA Trace     Nitrite, UA Negative     Urobilinogen, UA 1.0 E.U./dL    Narrative:      In absence of clinical symptoms, the presence of pyuria, bacteria, and/or nitrites on the urinalysis result does not correlate with infection.    Urinalysis, Microscopic Only - Urine, Clean Catch [[id number]]  (Abnormal) Collected: 06/01/25 1621    Specimen: Urine, Clean Catch Updated: 06/01/25 1641     RBC, UA 0-2 /HPF      WBC, UA 0-2 /HPF      Comment: Urine culture not indicated.        Bacteria, UA None Seen /HPF      Squamous Epithelial Cells, UA 7-12 /HPF      Hyaline Casts, UA None Seen /LPF      Mucus, UA Trace /HPF      Methodology Manual Light Microscopy    CBC & Differential [[id number]]  (Abnormal) Collected: 06/01/25 1652    Specimen: Blood Updated: 06/01/25 1657    Narrative:      The following orders were created for panel order CBC & Differential.  Procedure                               Abnormality         Status                     ---------                               -----------         ------                     CBC Auto Differential[[id number]]        Abnormal            Final result                 Please view results for these tests on the individual orders.    Comprehensive Metabolic  Panel [[id number]]  (Abnormal) Collected: 06/01/25 1652    Specimen: Blood Updated: 06/01/25 1716     Glucose 89 mg/dL      BUN 10.0 mg/dL      Creatinine 1.13 mg/dL      Sodium 138 mmol/L      Potassium 3.3 mmol/L      Chloride 102 mmol/L      CO2 23.0 mmol/L      Calcium 9.3 mg/dL      Total Protein 6.8 g/dL      Albumin 4.4 g/dL      ALT (SGPT) 28 U/L      AST (SGOT) 22 U/L      Alkaline Phosphatase 40 U/L      Total Bilirubin 0.3 mg/dL      Globulin 2.4 gm/dL      A/G Ratio 1.8 g/dL      BUN/Creatinine Ratio 8.8     Anion Gap 13.0 mmol/L      eGFR 57.9 mL/min/1.73     Narrative:      GFR Categories in Chronic Kidney Disease (CKD)              GFR Category          GFR (mL/min/1.73)    Interpretation  G1                    90 or greater        Normal or high (1)  G2                    60-89                Mild decrease (1)  G3a                   45-59                Mild to moderate decrease  G3b                   30-44                Moderate to severe decrease  G4                    15-29                Severe decrease  G5                    14 or less           Kidney failure    (1)In the absence of evidence of kidney disease, neither GFR category G1 or G2 fulfill the criteria for CKD.    eGFR calculation 2021 CKD-EPI creatinine equation, which does not include race as a factor    Lipase [[id number]]  (Normal) Collected: 06/01/25 1652    Specimen: Blood Updated: 06/01/25 1716     Lipase 26 U/L     High Sensitivity Troponin T [[id number]]  (Normal) Collected: 06/01/25 1652    Specimen: Blood Updated: 06/01/25 1719     HS Troponin T 9 ng/L     Narrative:      High Sensitive Troponin T Reference Range:  <14.0 ng/L- Negative Female for AMI  <22.0 ng/L- Negative Male for AMI  >=14 - Abnormal Female indicating possible myocardial injury.  >=22 - Abnormal Male indicating possible myocardial injury.   Clinicians would have to utilize clinical acumen, EKG, Troponin, and serial changes to determine if it is an Acute  Myocardial Infarction or myocardial injury due to an underlying chronic condition.         C-reactive Protein [[id number]]  (Normal) Collected: 06/01/25 1652    Specimen: Blood Updated: 06/01/25 1716     C-Reactive Protein 0.33 mg/dL     Lactic Acid, Plasma [[id number]]  (Normal) Collected: 06/01/25 1652    Specimen: Blood Updated: 06/01/25 1712     Lactate 0.7 mmol/L     Magnesium [[id number]]  (Normal) Collected: 06/01/25 1652    Specimen: Blood Updated: 06/01/25 1716     Magnesium 2.2 mg/dL     CBC Auto Differential [[id number]]  (Abnormal) Collected: 06/01/25 1652    Specimen: Blood Updated: 06/01/25 1657     WBC 6.28 10*3/mm3      RBC 4.52 10*6/mm3      Hemoglobin 13.6 g/dL      Hematocrit 40.0 %      MCV 88.5 fL      MCH 30.1 pg      MCHC 34.0 g/dL      RDW 11.6 %      RDW-SD 37.2 fl      MPV 10.1 fL      Platelets 249 10*3/mm3      Neutrophil % 65.4 %      Lymphocyte % 24.7 %      Monocyte % 7.3 %      Eosinophil % 1.6 %      Basophil % 0.8 %      Immature Grans % 0.2 %      Neutrophils, Absolute 4.11 10*3/mm3      Lymphocytes, Absolute 1.55 10*3/mm3      Monocytes, Absolute 0.46 10*3/mm3      Eosinophils, Absolute 0.10 10*3/mm3      Basophils, Absolute 0.05 10*3/mm3      Immature Grans, Absolute 0.01 10*3/mm3      nRBC 0.0 /100 WBC              CT Abdomen Pelvis With Contrast  Result Date: 6/1/2025  FINAL REPORT TECHNIQUE: null CLINICAL HISTORY: Bilateral flank pain, eval pyelonephritis, nephrolithiasis, urinary obstructio COMPARISON: null FINDINGS: CT abdomen and pelvis with contrast Comparison: None Findings: No consolidation at the lung bases. Calcified granuloma. Unremarkable gallbladder. Underdistended thick-walled bladder. Hepatic steatosis. Hemangioma in the right lobe of the liver measuring 2.7 cm. Multifocal subcentimeter enhancement in the liver, indeterminate. Splenic granulomatosis. No hydronephrosis or nephrolithiasis. Phleboliths in the ovarian veins. No focal decreased enhancement of the kidneys.  Subcentimeter low attenuating renal lesions which are too small to characterize. Status post hysterectomy. The other solid organs are unremarkable. Wall thickening of the distal esophagus. No bowel wall thickening or dilation. The appendix is not visualized. No secondary signs of acute appendicitis. No aneurysm. Moderate calcified atherosclerotic disease. No lymphadenopathy. No ascites. No acute osseous abnormality.     Impression: Impression: Underdistended thick-walled bladder may indicate cystitis. No CT evidence of pyelonephritis. No urinary tract stone or obstruction. Wall thickening of the distal esophagus may indicate esophagitis. Authenticated and Electronically Signed by Lia Ahmadi MD on 06/01/2025 05:42:13 PM         MDM      Initial impression of presenting illness: Flank pain    DDX: includes but is not limited to: Nephrolithiasis, pyelonephritis, urinary tract infection, urinary obstruction, gastroenteritis    Patient arrives stable with vitals interpreted by myself.     Pertinent features from physical exam: Clear to auscultation, regular rate and rhythm, no murmur, nontender to abdominal palpation at the time of my exam.    Initial diagnostic plan: CBC, CMP, lipase, UA, CRP, lactic acid, CT abdomen pelvis    Results from initial plan were reviewed and interpreted by me revealing no concern for pyelonephritis urinary tract infection, patient with esophagitis on CT imaging    Diagnostic information from other sources: Reviewed past medical records    Interventions / Re-evaluation: Will prescribe patient Meprazole for esophagitis.  Patient remained stable during ER course    Results/clinical rationale were discussed with patient at bedside    Consultations/Discussion of results with other physicians: Discussed esophagitis etiology of patient's abdominal pain.  Have prescribed patient omeprazole and sent referral for patient to follow with gastroenterology for further evaluation if EGD may be  necessary.  Given strict turn precaution for any severe increase in abdominal pain, fever    Disposition plan: Discharge  -----        Final diagnoses:   Flank pain   Esophagitis          Abner Hart MD  06/01/25 5947

## 2025-06-01 NOTE — Clinical Note
[facility] EMERGENCY DEPARTMENT  [address] [address]  Phone: [phone]    Edyta Franco was seen and treated in our emergency department on 6/1/2025.  She may return to work on 06/04/2025.         Thank you for choosing [facility].    Abner Hart MD

## 2025-06-02 ENCOUNTER — OFFICE VISIT (OUTPATIENT)
Age: 55
End: 2025-06-02
Payer: COMMERCIAL

## 2025-06-02 VITALS
BODY MASS INDEX: 29.07 KG/M2 | OXYGEN SATURATION: 96 % | DIASTOLIC BLOOD PRESSURE: 82 MMHG | WEIGHT: 154 LBS | HEART RATE: 90 BPM | SYSTOLIC BLOOD PRESSURE: 100 MMHG | HEIGHT: 61 IN

## 2025-06-02 DIAGNOSIS — N30.80 ACUTE BACTERIAL SIMPLE CYSTITIS: Primary | ICD-10-CM

## 2025-06-02 DIAGNOSIS — B96.89 ACUTE BACTERIAL SIMPLE CYSTITIS: Primary | ICD-10-CM

## 2025-06-02 RX ORDER — FLUCONAZOLE 100 MG/1
100 TABLET ORAL DAILY
Qty: 3 TABLET | Refills: 0 | Status: SHIPPED | OUTPATIENT
Start: 2025-06-02 | End: 2025-06-05

## 2025-06-02 RX ORDER — CIPROFLOXACIN 250 MG/1
250 TABLET, FILM COATED ORAL 2 TIMES DAILY
Qty: 10 TABLET | Refills: 0 | Status: SHIPPED | OUTPATIENT
Start: 2025-06-02 | End: 2025-06-07

## 2025-06-02 RX ORDER — KETOROLAC TROMETHAMINE 30 MG/ML
30 INJECTION, SOLUTION INTRAMUSCULAR; INTRAVENOUS ONCE
Status: COMPLETED | OUTPATIENT
Start: 2025-06-02 | End: 2025-06-02

## 2025-06-02 RX ADMIN — KETOROLAC TROMETHAMINE 30 MG: 30 INJECTION, SOLUTION INTRAMUSCULAR; INTRAVENOUS at 15:55

## 2025-06-02 NOTE — PROGRESS NOTES
Established Patient        Chief Complaint:   Chief Complaint   Patient presents with    Hospital Follow Up Visit     Abdominal pain        Edyta Franco is a 54 y.o. female    History of Present Illness:   Here today in follow-up of recent emergency room visit,, where patient presented secondary to dysuria and lower abdominal discomfort.  She describes some burning with urination, as well as some discoloration to her urine, has a history of urinary tract infections in the past.  Underwent CT scan of the abdomen which demonstrated wall thickening of the bladder consistent with cystitis.  Did have abnormalities on urinalysis additionally, however no findings of pyelonephritis.  Additional findings of some thickening of the distal esophagus additionally,  although no symptoms.         Subjective     The following portions of the patient's history were reviewed and updated as appropriate: allergies, current medications, past family history, past medical history, past social history, past surgical history and problem list.    Allergies   Allergen Reactions    Penicillins Hives       Review of Systems  Constitutional: Negative for fever. Negative for chills, diaphoresis; mild malaise/fatigue, chronic difficulties with weight loss efforts.  HENT: No dysphagia; no changes to vision/hearing/smell/taste; no epistaxis.  Heavy nasal congestion.  Eyes: Negative for redness and visual disturbance.   Respiratory: Cough as per above.  Cardiovascular: Negative for chest pain and palpitations.   Gastrointestinal: Negative for abdominal distention, abdominal pain and blood in stool.   Endocrine: Negative for cold intolerance and heat intolerance.   Genitourinary: Burning with urination, as well as suprapubic cramping discomfort.  Musculoskeletal: Chronic diffuse upper extremity and lower extremity arthralgias, back pain and myalgias.  Chronic gravity-dependent edema of lower extremities.  Skin: Negative for  "new rash or wound.   Neurological: Negative for syncope, weakness and headaches.   Hematological: Negative for adenopathy. Does not bruise/bleed easily.   Psychiatric/Behavioral: Negative for confusion. The patient is not nervous/anxious.    Objective     Physical Exam   Vital Signs: /82   Pulse 90   Ht 154.9 cm (61\")   Wt 69.9 kg (154 lb)   LMP  (LMP Unknown)   SpO2 96%   BMI 29.10 kg/m²       Patient's (Body mass index is 29.1 kg/m².) indicates that they are overweight (BMI 25-29.9) with health related conditions that include dyslipidemias . Weight is unchanged. BMI is is above average; BMI management plan is completed. We discussed portion control and increasing exercise.      General Appearance: alert, oriented x 3, no acute distress.  Mildly ill-appearing, however remains pleasant and interactive during questioning/examination.  Skin: warm and dry.   HEENT: Atraumatic.  pupils round and reactive to light and accommodation, oral mucosa pink and moist.  Nares patent without epistaxis.  External auditory canals are patent tympanic membranes intact.  Heavy nasal congestion, boggy/inflamed nasal turbinates; moderate postnasal drainage with diffuse erythema to the posterior pharynx.  Neck: supple, no JVD, trachea midline.  No thyromegaly  Lungs: Rhonchus, referred, upper airway congestion that is partially cleared with cough, unlabored breathing effort.  Audible air exchange noted all lung fields.  Heart: RRR, normal S1 and S2, no S3, no rub.  Abdomen: soft, non-tender, no palpable bladder, present bowel sounds to auscultation ×4.  No guarding or rigidity.  No CVA tenderness.  Extremities: no clubbing, cyanosis.  Good range of motion actively and passively.  Symmetric muscle strength and development.  Nonpitting bilateral lower extremity edema, gravity-dependent, that extends to the proximal third of the tibias.  Neuro: normal speech and mental status.  Cranial nerves II through XII intact.  No anosmia. " DTR 2+; proprioception intact.  No focal motor/sensory deficits.        Assessment and Plan      Assessment/Plan:   Diagnoses and all orders for this visit:    1. Acute bacterial simple cystitis (Primary)  -     ciprofloxacin (Cipro) 250 MG tablet; Take 1 tablet by mouth 2 (Two) Times a Day for 5 days.  Dispense: 10 tablet; Refill: 0  -     ketorolac (TORADOL) injection 30 mg  -     fluconazole (Diflucan) 100 MG tablet; Take 1 tablet by mouth Daily for 3 days.  Dispense: 3 tablet; Refill: 0    Her clinical presentation is consistent with acute urinary tract infection.  I have provided her with a 5-day course of ciprofloxacin, as well as a 3-day course of Diflucan.  She is provided a 30 mg IM injection of ketorolac today, this should aid in relieving her of the most significant portion of her cystitis inflammatory changes.  I have asked that she notify the office should she develop any ill effects to the above antibiotic regimen, or if her symptoms fail to resolve.    No signs demonstrate hemodynamic stability.    Discussion Summary:    Discussed plan of care in detail with pt today; pt verb understanding and agrees.    I spent 30 minutes caring for Edyta on this date of service. This time includes time spent by me in the following activities:preparing for the visit, reviewing tests, obtaining and/or reviewing a separately obtained history, performing a medically appropriate examination and/or evaluation , counseling and educating the patient/family/caregiver, ordering medications, tests, or procedures, documenting information in the medical record, and care coordination    I confirm accuracy of unchanged data/findings which have been carried forward from previous visit, as well as I have updated appropriately those that have changed.        Follow up:  No follow-ups on file.     There are no Patient Instructions on file for this visit.    Bill Kimble DO  06/02/25  15:26 EDT

## 2025-06-04 ENCOUNTER — TELEPHONE (OUTPATIENT)
Dept: URGENT CARE | Facility: CLINIC | Age: 55
End: 2025-06-04
Payer: COMMERCIAL

## 2025-07-02 RX ORDER — SEMAGLUTIDE 0.25 MG/.5ML
0.25 INJECTION, SOLUTION SUBCUTANEOUS WEEKLY
Qty: 2 ML | Refills: 1 | Status: SHIPPED | OUTPATIENT
Start: 2025-07-02

## 2025-07-02 NOTE — TELEPHONE ENCOUNTER
Rx Refill Note  Requested Prescriptions     Pending Prescriptions Disp Refills    Semaglutide-Weight Management (Wegovy) 0.25 MG/0.5ML solution auto-injector 2 mL 0     Sig: Inject 0.5 mL under the skin into the appropriate area as directed 1 (One) Time Per Week.      Last office visit with prescribing clinician: 6/2/2025   Last telemedicine visit with prescribing clinician: Visit date not found   Next office visit with prescribing clinician: 9/22/2025                         Would you like a call back once the refill request has been completed: [] Yes [] No    If the office needs to give you a call back, can they leave a voicemail: [] Yes [] No    Yola Cunha MA  07/02/25, 12:09 EDT

## 2025-07-12 DIAGNOSIS — E78.01 FAMILIAL HYPERCHOLESTEROLEMIA: ICD-10-CM

## 2025-07-14 NOTE — TELEPHONE ENCOUNTER
Rx Refill Note  Requested Prescriptions     Pending Prescriptions Disp Refills    pravastatin (PRAVACHOL) 20 MG tablet [Pharmacy Med Name: PRAVASTATIN 20MG    TAB] 90 tablet 0     Sig: Take 1 tablet by mouth once daily      Last office visit with prescribing clinician: 6/2/2025   Last telemedicine visit with prescribing clinician: Visit date not found   Next office visit with prescribing clinician: 9/22/2025                         Would you like a call back once the refill request has been completed: [] Yes [] No    If the office needs to give you a call back, can they leave a voicemail: [] Yes [] No    Cary Miranda MA  07/14/25, 14:19 EDT

## 2025-07-15 ENCOUNTER — HOSPITAL ENCOUNTER (OUTPATIENT)
Dept: MAMMOGRAPHY | Facility: HOSPITAL | Age: 55
Discharge: HOME | End: 2025-07-15
Admitting: OBSTETRICS & GYNECOLOGY
Payer: COMMERCIAL

## 2025-07-15 DIAGNOSIS — Z12.31 ENCOUNTER FOR SCREENING MAMMOGRAM FOR MALIGNANT NEOPLASM OF BREAST: ICD-10-CM

## 2025-07-15 PROCEDURE — 77067 SCR MAMMO BI INCL CAD: CPT

## 2025-07-15 PROCEDURE — 77063 BREAST TOMOSYNTHESIS BI: CPT

## 2025-07-16 RX ORDER — PRAVASTATIN SODIUM 20 MG
20 TABLET ORAL DAILY
Qty: 90 TABLET | Refills: 3 | Status: SHIPPED | OUTPATIENT
Start: 2025-07-16

## 2025-07-28 ENCOUNTER — CLINICAL SUPPORT (OUTPATIENT)
Age: 55
End: 2025-07-28
Payer: COMMERCIAL

## 2025-07-28 DIAGNOSIS — S91.331A PUNCTURE WOUND OF RIGHT FOOT, INITIAL ENCOUNTER: Primary | ICD-10-CM

## 2025-07-28 DIAGNOSIS — Z23 NEED FOR TDAP VACCINATION: Primary | ICD-10-CM

## 2025-07-28 PROCEDURE — 90715 TDAP VACCINE 7 YRS/> IM: CPT | Performed by: FAMILY MEDICINE

## 2025-07-28 PROCEDURE — 90471 IMMUNIZATION ADMIN: CPT | Performed by: FAMILY MEDICINE

## 2025-07-30 NOTE — TELEPHONE ENCOUNTER
HUB TO RELAY    [facility] FOR PATIENT TO CALL AND SCHEDULE AND OFFICE VISIT PER DR. MORALES INSTRUCTIONS - Patient should be following up with me in September but I do not see that appointment scheduled yet. Please have her schedule an appointment     PATIENT WILL NEED TO BE SEEN IN ORDER TO CONTINUE RECEIVING REFILLS ON HER MEDICATIONS.   PATIENT TESTED LAST WEEK AND HOME TEST WAS POSITIVE FOR COVID.  NOT GETTING BETTER, COUGH, FEVER, ACHY, NO ENERGY.  WHAT TO DO?    PLEASE CALL [phone]

## (undated) DEVICE — SKIN AFFIX SURG ADHESIVE 72/CS 0.55ML: Brand: MEDLINE

## (undated) DEVICE — CANNULA,ADULT,SOFT-TOUCH,7TUBE,SC: Brand: MEDLINE

## (undated) DEVICE — INTENDED FOR TISSUE SEPARATION, AND OTHER PROCEDURES THAT REQUIRE A SHARP SURGICAL BLADE TO PUNCTURE OR CUT.: Brand: BARD-PARKER ® STAINLESS STEEL BLADES

## (undated) DEVICE — ANTIBACTERIAL UNDYED BRAIDED (POLYGLACTIN 910), SYNTHETIC ABSORBABLE SUTURE: Brand: COATED VICRYL

## (undated) DEVICE — KT DRN EVAC WND PVC PCH WTROC RND 10F400

## (undated) DEVICE — AIRWY 90MM NO9

## (undated) DEVICE — GLV SURG SENSICARE MICRO PF LF 8.5 STRL

## (undated) DEVICE — GLV SURG SENSICARE MICRO PF LF 6.5 STRL

## (undated) DEVICE — PACK,COLD,STANDARD,OB-PAD,4.5X14.25: Brand: MEDLINE

## (undated) DEVICE — APPL CHLORAPREP W/TINT 26ML BLU

## (undated) DEVICE — SYR CONTRL LUERLOK 10CC

## (undated) DEVICE — GLV SURG SENSICARE W/ALOE PF LF SZ6 STRL

## (undated) DEVICE — SOL LR 1000ML

## (undated) DEVICE — Device

## (undated) DEVICE — NDL HYPO ECLPS SFTY 22G 1 1/2IN

## (undated) DEVICE — IRRIGATOR BULB ASEPTO 60CC STRL

## (undated) DEVICE — INTENDED USE FOR SURGICAL MARKING ON INTACT SKIN, ALSO PROVIDES A PERMANENT METHOD OF IDENTIFYING OBJECTS IN THE OPERATING ROOM: Brand: WRITESITE® REGULAR TIP SKIN MARKER

## (undated) DEVICE — FRCP BIOP COLD ENDOJAW ALLGTR W/NDL 2.8X2300MM BLU

## (undated) DEVICE — SUT SILK 3/0 TIES 18IN A184H

## (undated) DEVICE — GLV SURG SENSICARE W/ALOE PF LF 6.5 STRL

## (undated) DEVICE — SUT SILK 2/0 PS 18IN 1588H

## (undated) DEVICE — LEGGINGS, PAIR, 29X43, STERILE: Brand: MEDLINE

## (undated) DEVICE — HYPODERMIC SAFETY NEEDLE: Brand: MONOJECT

## (undated) DEVICE — GLV SURG SENSICARE PI MIC PF SZ6 LF STRL

## (undated) DEVICE — MEDI-VAC YANKAUER SUCTION HANDLE W/BULBOUS TIP: Brand: CARDINAL HEALTH

## (undated) DEVICE — UNDERGLV SURG BIOGEL INDICAT PF 61/2 GRN

## (undated) DEVICE — LUBE JELLY PK/2.75GM STRL BX/144

## (undated) DEVICE — LEX VAGINAL HYSTERECTOMY: Brand: MEDLINE INDUSTRIES, INC.

## (undated) DEVICE — HARMONIC FOCUS SHEARS 9CM LENGTH + ADAPTIVE TISSUE TECHNOLOGY FOR USE WITH BLUE HAND PIECE ONLY: Brand: HARMONIC FOCUS

## (undated) DEVICE — KITTNER SPONGE: Brand: DEROYAL

## (undated) DEVICE — SUT SILK 2/0 TIES 18IN A185H

## (undated) DEVICE — DRAIN JACKSON PRATT ROUND 15FR: Brand: CARDINAL HEALTH

## (undated) DEVICE — MEDI-VAC NON-CONDUCTIVE SUCTION TUBING: Brand: CARDINAL HEALTH

## (undated) DEVICE — Device: Brand: DEFENDO AIR/WATER/SUCTION AND BIOPSY VALVE

## (undated) DEVICE — CVR HNDL LT SURG ACCSSRY BLU STRL

## (undated) DEVICE — JACKSON-PRATT 100CC BULB RESERVOIR: Brand: CARDINAL HEALTH

## (undated) DEVICE — TUBING, SUCTION, 1/4" X 10', STRAIGHT: Brand: MEDLINE

## (undated) DEVICE — LEX GENERAL ABDOMINAL SPLIT: Brand: MEDLINE INDUSTRIES, INC.

## (undated) DEVICE — GLV SURG SENSICARE MICRO PF LF 7 STRL

## (undated) DEVICE — COVER,MAYO STAND,STERILE: Brand: MEDLINE

## (undated) DEVICE — SUT MNCRYL PLS ANTIB UD 3/0 PS2 27IN

## (undated) DEVICE — YANKAUER,BULB TIP, NO VENT: Brand: ARGYLE

## (undated) DEVICE — GLV SURG DERMASSURE GRN LF PF SZ 6.5

## (undated) DEVICE — TRAP FLD MINIVAC MEGADYNE 100ML

## (undated) DEVICE — GLV SURG SENSICARE MICRO PF LF 6 STRL

## (undated) DEVICE — DRSNG WND BORDR/ADHS NONADHR/GZ LF 2X2IN STRL

## (undated) DEVICE — PENCL SMOKE/EVAC MEGADYNE TELESCP 10FT

## (undated) DEVICE — SUT VIC 2/0 SH 27IN

## (undated) DEVICE — SHEET, DRAPE, SPLIT, STERILE: Brand: MEDLINE

## (undated) DEVICE — PANTY PRE/OP MODEST 1/SZ BLK DISP